# Patient Record
Sex: FEMALE | Race: WHITE | Employment: FULL TIME | URBAN - METROPOLITAN AREA
[De-identification: names, ages, dates, MRNs, and addresses within clinical notes are randomized per-mention and may not be internally consistent; named-entity substitution may affect disease eponyms.]

---

## 2012-05-16 LAB — PAP SMEAR - HIM PATIENT REPORTED: NEGATIVE

## 2015-12-31 LAB — PAP-ABSTRACT: NORMAL

## 2017-01-27 ENCOUNTER — TRANSFERRED RECORDS (OUTPATIENT)
Dept: HEALTH INFORMATION MANAGEMENT | Facility: CLINIC | Age: 27
End: 2017-01-27

## 2017-01-27 LAB — PHQ9 SCORE: 1

## 2017-07-25 ENCOUNTER — OFFICE VISIT (OUTPATIENT)
Dept: FAMILY MEDICINE | Facility: CLINIC | Age: 27
End: 2017-07-25
Payer: COMMERCIAL

## 2017-07-25 VITALS
WEIGHT: 293 LBS | DIASTOLIC BLOOD PRESSURE: 106 MMHG | TEMPERATURE: 98.7 F | BODY MASS INDEX: 39.68 KG/M2 | HEART RATE: 76 BPM | SYSTOLIC BLOOD PRESSURE: 168 MMHG | HEIGHT: 72 IN | RESPIRATION RATE: 16 BRPM

## 2017-07-25 DIAGNOSIS — E66.01 MORBID OBESITY DUE TO EXCESS CALORIES (H): ICD-10-CM

## 2017-07-25 DIAGNOSIS — F33.0 MAJOR DEPRESSIVE DISORDER, RECURRENT EPISODE, MILD (H): Primary | ICD-10-CM

## 2017-07-25 DIAGNOSIS — R03.0 ELEVATED BLOOD PRESSURE READING WITHOUT DIAGNOSIS OF HYPERTENSION: ICD-10-CM

## 2017-07-25 DIAGNOSIS — F41.9 ANXIETY: ICD-10-CM

## 2017-07-25 LAB
ERYTHROCYTE [DISTWIDTH] IN BLOOD BY AUTOMATED COUNT: 11.5 % (ref 10–15)
HCT VFR BLD AUTO: 40.3 % (ref 35–47)
HGB BLD-MCNC: 13.3 G/DL (ref 11.7–15.7)
MCH RBC QN AUTO: 30.4 PG (ref 26.5–33)
MCHC RBC AUTO-ENTMCNC: 33 G/DL (ref 31.5–36.5)
MCV RBC AUTO: 92 FL (ref 78–100)
PLATELET # BLD AUTO: 301 10E9/L (ref 150–450)
RBC # BLD AUTO: 4.37 10E12/L (ref 3.8–5.2)
WBC # BLD AUTO: 5.8 10E9/L (ref 4–11)

## 2017-07-25 PROCEDURE — 84443 ASSAY THYROID STIM HORMONE: CPT | Performed by: PHYSICIAN ASSISTANT

## 2017-07-25 PROCEDURE — 99203 OFFICE O/P NEW LOW 30 MIN: CPT | Performed by: PHYSICIAN ASSISTANT

## 2017-07-25 PROCEDURE — 36415 COLL VENOUS BLD VENIPUNCTURE: CPT | Performed by: PHYSICIAN ASSISTANT

## 2017-07-25 PROCEDURE — 85027 COMPLETE CBC AUTOMATED: CPT | Performed by: PHYSICIAN ASSISTANT

## 2017-07-25 PROCEDURE — 80048 BASIC METABOLIC PNL TOTAL CA: CPT | Performed by: PHYSICIAN ASSISTANT

## 2017-07-25 RX ORDER — FLUOXETINE 40 MG/1
CAPSULE ORAL
Refills: 4 | COMMUNITY
Start: 2017-04-30 | End: 2018-09-17

## 2017-07-25 RX ORDER — NORGESTIMATE AND ETHINYL ESTRADIOL 7DAYSX3 28
KIT ORAL
Refills: 4 | COMMUNITY
Start: 2017-04-30 | End: 2017-07-31 | Stop reason: ALTCHOICE

## 2017-07-25 ASSESSMENT — ANXIETY QUESTIONNAIRES
7. FEELING AFRAID AS IF SOMETHING AWFUL MIGHT HAPPEN: NOT AT ALL
3. WORRYING TOO MUCH ABOUT DIFFERENT THINGS: SEVERAL DAYS
1. FEELING NERVOUS, ANXIOUS, OR ON EDGE: SEVERAL DAYS
6. BECOMING EASILY ANNOYED OR IRRITABLE: NOT AT ALL
IF YOU CHECKED OFF ANY PROBLEMS ON THIS QUESTIONNAIRE, HOW DIFFICULT HAVE THESE PROBLEMS MADE IT FOR YOU TO DO YOUR WORK, TAKE CARE OF THINGS AT HOME, OR GET ALONG WITH OTHER PEOPLE: SOMEWHAT DIFFICULT
2. NOT BEING ABLE TO STOP OR CONTROL WORRYING: SEVERAL DAYS
5. BEING SO RESTLESS THAT IT IS HARD TO SIT STILL: NOT AT ALL
GAD7 TOTAL SCORE: 3

## 2017-07-25 ASSESSMENT — PATIENT HEALTH QUESTIONNAIRE - PHQ9: 5. POOR APPETITE OR OVEREATING: NOT AT ALL

## 2017-07-25 NOTE — NURSING NOTE
"Chief Complaint   Patient presents with     New Patient     Establish Care     Recheck Medication       Initial BP (!) 196/96 (BP Location: Right arm, Patient Position: Sitting, Cuff Size: Adult Large)  Pulse 76  Temp 98.7  F (37.1  C) (Oral)  Resp 16  Ht 6' 1.25\" (1.861 m)  Wt (!) 384 lb (174.2 kg)  BMI 50.32 kg/m2 Estimated body mass index is 50.32 kg/(m^2) as calculated from the following:    Height as of this encounter: 6' 1.25\" (1.861 m).    Weight as of this encounter: 384 lb (174.2 kg).  Medication Reconciliation: complete   Olivia Kelly MA      "

## 2017-07-25 NOTE — MR AVS SNAPSHOT
After Visit Summary   7/25/2017    Zenobia Cantu    MRN: [id number]           Patient Information     Date Of Birth          1990        Visit Information        Provider Department      7/25/2017 4:00 PM Joe Ramirez PA-C [facility]        Today's Diagnoses     Major depressive disorder, recurrent episode, mild (H)    -  1    Morbid obesity due to excess calories (H)        Anxiety        Elevated blood pressure reading without diagnosis of hypertension          Care Instructions    MyFitnessPal 1200 calories  150 minutes per weeks            Follow-ups after your visit        Additional Services     ENDOCRINOLOGY ADULT REFERRAL       Your provider has referred you to: [facility]: [facility] [phone]   [email or web address]/      Please be aware that coverage of these services is subject to the terms and limitations of your health insurance plan.  Call member services at your health plan with any benefit or coverage questions.      Please bring the following to your appointment:    >>   Any x-rays, CTs or MRIs which have been performed.  Contact the facility where they were done to arrange for  prior to your scheduled appointment.    >>   List of current medications   >>   This referral request   >>   Any documents/labs given to you for this referral                  Who to contact     If you have questions or need follow up information about today's clinic visit or your schedule please contact [facility] directly at [phone].  Normal or non-critical lab and imaging results will be communicated to you by MyChart, letter or phone within 4 business days after the clinic has received the results. If you do not hear from us within 7 days, please contact the clinic through MyChart or phone. If you have a critical or abnormal lab result, we will notify you by phone as soon as possible.  Submit  "refill requests through [employer] or call your pharmacy and they will forward the refill request to us. Please allow 3 business days for your refill to be completed.          Additional Information About Your Visit        Drivablehart Information     [employer] lets you send messages to your doctor, view your test results, renew your prescriptions, schedule appointments and more. To sign up, go to [email or web address] . Click on \"Log in\" on the left side of the screen, which will take you to the Welcome page. Then click on \"Sign up Now\" on the right side of the page.     You will be asked to enter the access code listed below, as well as some personal information. Please follow the directions to create your username and password.     Your access code is: [email or web address]  Expires: 10/23/2017  4:57 PM     Your access code will  in 90 days. If you need help or a new code, please call your [place] clinic or [phone].        Care EveryWhere ID     This is your Care EveryWhere ID. This could be used by other organizations to access your [place] medical records  [id number]        Your Vitals Were     Pulse Temperature Respirations Height BMI (Body Mass Index)       76 98.7  F (37.1  C) (Oral) 16 6' 1.25\" (1.861 m) 50.32 kg/m2        Blood Pressure from Last 3 Encounters:   17 (!) 168/106    Weight from Last 3 Encounters:   17 (!) 384 lb (174.2 kg)              We Performed the Following     Basic metabolic panel     CBC with platelets     ENDOCRINOLOGY ADULT REFERRAL     TSH with free T4 reflex        Primary Care Provider    None Specified       No primary provider on file.        Equal Access to Services     [facility]: Hadii valerio Sorto, waaxda luqadaha, qaybta kaalmada ronel iyer. So [facility] [place] [phone].    ATENCIÓN: Si habla español, tiene a prescott disposición servicios gratuitos de asistencia lingüística. Llame al [phone].    We comply with " applicable federal civil rights laws and [place] laws. We do not discriminate on the basis of race, color, national origin, age, disability sex, sexual orientation or gender identity.            Thank you!     Thank you for choosing [facility]  for your care. Our goal is always to provide you with excellent care. Hearing back from our patients is one way we can continue to improve our services. Please take a few minutes to complete the written survey that you may receive in the mail after your visit with us. Thank you!             Your Updated Medication List - Protect others around you: Learn how to safely use, store and throw away your medicines at www.disposemymeds.org.          This list is accurate as of: 7/25/17  4:57 PM.  Always use your most recent med list.                   Brand Name Dispense Instructions for use Diagnosis    FLUoxetine 40 MG capsule    PROzac     TK 1 C PO D        TRI-SPRINTEC 0.18/0.215/0.25 MG-35 MCG per tablet   Generic drug:  norgestim-eth estrad triphasic      TK 1 T PO D

## 2017-07-26 LAB
ANION GAP SERPL CALCULATED.3IONS-SCNC: 5 MMOL/L (ref 3–14)
BUN SERPL-MCNC: 10 MG/DL (ref 7–30)
CALCIUM SERPL-MCNC: 8.7 MG/DL (ref 8.5–10.1)
CHLORIDE SERPL-SCNC: 106 MMOL/L (ref 94–109)
CO2 SERPL-SCNC: 28 MMOL/L (ref 20–32)
CREAT SERPL-MCNC: 0.79 MG/DL (ref 0.52–1.04)
GFR SERPL CREATININE-BSD FRML MDRD: 88 ML/MIN/1.7M2
GLUCOSE SERPL-MCNC: 89 MG/DL (ref 70–99)
POTASSIUM SERPL-SCNC: 4.4 MMOL/L (ref 3.4–5.3)
SODIUM SERPL-SCNC: 139 MMOL/L (ref 133–144)
TSH SERPL DL<=0.005 MIU/L-ACNC: 1.9 MU/L (ref 0.4–4)

## 2017-07-26 ASSESSMENT — ANXIETY QUESTIONNAIRES: GAD7 TOTAL SCORE: 3

## 2017-07-26 ASSESSMENT — PATIENT HEALTH QUESTIONNAIRE - PHQ9: SUM OF ALL RESPONSES TO PHQ QUESTIONS 1-9: 2

## 2017-07-27 ENCOUNTER — ALLIED HEALTH/NURSE VISIT (OUTPATIENT)
Dept: NURSING | Facility: CLINIC | Age: 27
End: 2017-07-27
Payer: COMMERCIAL

## 2017-07-27 VITALS — DIASTOLIC BLOOD PRESSURE: 100 MMHG | SYSTOLIC BLOOD PRESSURE: 172 MMHG | HEART RATE: 82 BPM | RESPIRATION RATE: 16 BRPM

## 2017-07-27 DIAGNOSIS — R03.0 ELEVATED BLOOD PRESSURE READING WITHOUT DIAGNOSIS OF HYPERTENSION: Primary | ICD-10-CM

## 2017-07-27 PROCEDURE — 99207 ZZC NO CHARGE NURSE ONLY: CPT

## 2017-07-27 NOTE — MR AVS SNAPSHOT
After Visit Summary   7/27/2017    Zenobia Cantu    MRN: [id number]           Patient Information     Date Of Birth          1990        Visit Information        Provider Department      7/27/2017 4:00 PM FM NURSE [facility]        Today's Diagnoses     Elevated blood pressure reading without diagnosis of hypertension    -  1       Follow-ups after your visit        Your next 10 appointments already scheduled     Jul 27, 2017  4:00 PM CDT   Nurse [facility] with [facility]SE   [facility]    [address] [place] [address] [phone]            Jul 31, 2017  3:20 PM CDT   SHORT with Joe Ramirez PA-C   [facility]    [address] [place] [address] [phone]            Aug 02, 2017  3:30 PM CDT   New Visit with LENORA [facility] CNP   [facility]    [address]  [facility] [address] [phone]              Who to contact     If you have questions or need follow up information about today's clinic visit or your schedule please contact [facility] directly at [phone].  Normal or non-critical lab and imaging results will be communicated to you by MyChart, letter or phone within 4 business days after the clinic has received the results. If you do not hear from us within 7 days, please contact the clinic through MyChart or phone. If you have a critical or abnormal lab result, we will notify you by phone as soon as possible.  Submit refill requests through [employer] or call your pharmacy and they will forward the refill request to us. Please allow 3 business days for your refill to be completed.          Additional Information About Your Visit        [employer] Information     [employer] gives you secure access to your electronic health record. If you  see a primary care provider, you can also send messages to your care team and make appointments. If you have questions, please call your primary care clinic.  If you do not have a primary care provider, please call [phone] and they will assist you.        Care EveryWhere ID     This is your Care EveryWhere ID. This could be used by other organizations to access your [place] medical records  [id number]        Your Vitals Were     Pulse Respirations                82 16           Blood Pressure from Last 3 Encounters:   07/27/17 (!) 172/100   07/25/17 (!) 168/106    Weight from Last 3 Encounters:   07/25/17 (!) 384 lb (174.2 kg)              Today, you had the following     No orders found for display       Primary Care Provider    None Specified       No primary provider on file.        Equal Access to Services     DAVY RAMIREZ : Charli Sorto, waaxda luqadaha, qaybta kaalmada jadenyarajiv, ronel kerr . So [facility] [phone].    ATENCIÓN: Si habla español, tiene a prescott disposición servicios gratuitos de asistencia lingüística. Llame al [phone].    We comply with applicable federal civil rights laws and [place] laws. We do not discriminate on the basis of race, color, national origin, age, disability sex, sexual orientation or gender identity.            Thank you!     Thank you for choosing [facility]  for your care. Our goal is always to provide you with excellent care. Hearing back from our patients is one way we can continue to improve our services. Please take a few minutes to complete the written survey that you may receive in the mail after your visit with us. Thank you!             Your Updated Medication List - Protect others around you: Learn how to safely use, store and throw away your medicines at www.disposemymeds.org.          This list is accurate as of: 7/27/17  3:50 PM.  Always use your most recent med list.                    Brand Name Dispense Instructions for use Diagnosis    FLUoxetine 40 MG capsule    PROzac     TK 1 C PO D        TRI-SPRINTEC 0.18/0.215/0.25 MG-35 MCG per tablet   Generic drug:  norgestim-eth estrad triphasic      TK 1 T PO D

## 2017-07-27 NOTE — PROGRESS NOTES
Zenobia Cantu is a 27 year old female who comes in today for a Blood Pressure check because of Recent high blood pressure reading at appointment.    *Document pulse and BP  *Use new set of vitals button for multiple readings.  *Use extended vitals for orthostatic    Vitals as recorded, a large cuff was used.      Current complaints: none    Disposition: Instructed patient to make follow up appointment with provider for elevated blood pressure.

## 2017-07-31 ENCOUNTER — OFFICE VISIT (OUTPATIENT)
Dept: FAMILY MEDICINE | Facility: CLINIC | Age: 27
End: 2017-07-31
Payer: COMMERCIAL

## 2017-07-31 VITALS
HEART RATE: 82 BPM | BODY MASS INDEX: 50.13 KG/M2 | DIASTOLIC BLOOD PRESSURE: 86 MMHG | WEIGHT: 293 LBS | OXYGEN SATURATION: 98 % | TEMPERATURE: 98.2 F | SYSTOLIC BLOOD PRESSURE: 184 MMHG

## 2017-07-31 DIAGNOSIS — I10 BENIGN ESSENTIAL HYPERTENSION: Primary | ICD-10-CM

## 2017-07-31 DIAGNOSIS — Z30.41 ENCOUNTER FOR SURVEILLANCE OF CONTRACEPTIVE PILLS: ICD-10-CM

## 2017-07-31 PROBLEM — E66.01 MORBID OBESITY (H): Status: ACTIVE | Noted: 2017-07-31

## 2017-07-31 PROCEDURE — 99213 OFFICE O/P EST LOW 20 MIN: CPT | Performed by: PHYSICIAN ASSISTANT

## 2017-07-31 RX ORDER — NORGESTIMATE AND ETHINYL ESTRADIOL 7DAYSX3 LO
1 KIT ORAL DAILY
Qty: 84 TABLET | Refills: 3 | Status: SHIPPED | OUTPATIENT
Start: 2017-07-31 | End: 2018-03-19

## 2017-07-31 RX ORDER — AMLODIPINE BESYLATE 5 MG/1
5 TABLET ORAL DAILY
Qty: 30 TABLET | Refills: 1 | Status: SHIPPED | OUTPATIENT
Start: 2017-07-31 | End: 2017-08-28

## 2017-07-31 ASSESSMENT — PATIENT HEALTH QUESTIONNAIRE - PHQ9: 5. POOR APPETITE OR OVEREATING: SEVERAL DAYS

## 2017-07-31 ASSESSMENT — ANXIETY QUESTIONNAIRES
GAD7 TOTAL SCORE: 11
1. FEELING NERVOUS, ANXIOUS, OR ON EDGE: SEVERAL DAYS
7. FEELING AFRAID AS IF SOMETHING AWFUL MIGHT HAPPEN: MORE THAN HALF THE DAYS
3. WORRYING TOO MUCH ABOUT DIFFERENT THINGS: MORE THAN HALF THE DAYS
5. BEING SO RESTLESS THAT IT IS HARD TO SIT STILL: SEVERAL DAYS
2. NOT BEING ABLE TO STOP OR CONTROL WORRYING: NEARLY EVERY DAY
IF YOU CHECKED OFF ANY PROBLEMS ON THIS QUESTIONNAIRE, HOW DIFFICULT HAVE THESE PROBLEMS MADE IT FOR YOU TO DO YOUR WORK, TAKE CARE OF THINGS AT HOME, OR GET ALONG WITH OTHER PEOPLE: SOMEWHAT DIFFICULT
6. BECOMING EASILY ANNOYED OR IRRITABLE: SEVERAL DAYS

## 2017-07-31 NOTE — PROGRESS NOTES
SUBJECTIVE:                                                    Zenobia Cantu is a 27 year old female who presents to clinic today for the following health issues:    Hypertension Follow-up      Outpatient blood pressures are not being checked.    Low Salt Diet: no added salt        Amount of exercise or physical activity: 4-5 days/week for an average of greater than 60 minutes    Problems taking medications regularly: No    Medication side effects: none  Diet: low salt    Zenobia is here for follow up of several moderately to severely elevated blood pressure readings.  She is new to our clinic and notes that she has had some elevated readings at her past clinic but never this high.  She has been feeling well in general and denies CP, SOB, headache, dizziness.  Her father has a history of HTN and is on medication.  The labs we did last time she was in were normal.    Results for orders placed or performed in visit on 07/25/17   TSH with free T4 reflex   Result Value Ref Range    TSH 1.90 0.40 - 4.00 mU/L   Basic metabolic panel   Result Value Ref Range    Sodium 139 133 - 144 mmol/L    Potassium 4.4 3.4 - 5.3 mmol/L    Chloride 106 94 - 109 mmol/L    Carbon Dioxide 28 20 - 32 mmol/L    Anion Gap 5 3 - 14 mmol/L    Glucose 89 70 - 99 mg/dL    Urea Nitrogen 10 7 - 30 mg/dL    Creatinine 0.79 0.52 - 1.04 mg/dL    GFR Estimate 88 >60 mL/min/1.7m2    GFR Estimate If Black >90   GFR Calc   >60 mL/min/1.7m2    Calcium 8.7 8.5 - 10.1 mg/dL   CBC with platelets   Result Value Ref Range    WBC 5.8 4.0 - 11.0 10e9/L    RBC Count 4.37 3.8 - 5.2 10e12/L    Hemoglobin 13.3 11.7 - 15.7 g/dL    Hematocrit 40.3 35.0 - 47.0 %    MCV 92 78 - 100 fl    MCH 30.4 26.5 - 33.0 pg    MCHC 33.0 31.5 - 36.5 g/dL    RDW 11.5 10.0 - 15.0 %    Platelet Count 301 150 - 450 10e9/L       BP Readings from Last 6 Encounters:   08/04/17 180/86   08/02/17 (!) 168/101   07/31/17 184/86   07/27/17 (!) 172/100   07/25/17 (!) 168/106        Problem list and histories reviewed & adjusted, as indicated.  Additional history: as documented    Reviewed and updated as needed this visit by clinical staff     Reviewed and updated as needed this visit by Provider         ROS:  Constitutional, HEENT, cardiovascular, pulmonary, gi and gu systems are negative, except as otherwise noted.      OBJECTIVE:   /86  Pulse 82  Temp 98.2  F (36.8  C) (Oral)  Wt (!) 382 lb 9.6 oz (173.5 kg)  SpO2 98%  BMI 50.13 kg/m2  Body mass index is 50.13 kg/(m^2).  GENERAL: healthy, alert and no distress  NECK: no adenopathy, no asymmetry, masses, or scars and thyroid normal to palpation  CV: regular rate and rhythm, normal S1 S2, no S3 or S4, no murmur, click or rub, no peripheral edema and peripheral pulses strong  MS: no gross musculoskeletal defects noted, no edema  SKIN: no suspicious lesions or rashes  PSYCH: mentation appears normal, affect normal/bright    Diagnostic Test Results:  none     ASSESSMENT/PLAN:   1. Benign essential hypertension  Start Norvasc today.  Nurse only BP check at the end of this week.  She is seeing Darya Fuentes for weight consult also in two days.  Discussed mechanism of action of medication, proper dosing, potential side effects and appropriate follow up.    - amLODIPine (NORVASC) 5 MG tablet; Take 1 tablet (5 mg) by mouth daily (Patient taking differently: Take 10 mg by mouth daily )  Dispense: 30 tablet; Refill: 1    2. Encounter for surveillance of contraceptive pills  Will change OCP to lower dose estrogen content, same progesterone.  Discussed to let me know if problems with spotting etc.  This could be affecting BP, could help to lower estrogen amount from 30 to 20mcg pill.  - norgestim-eth estrad triphasic (ORTHO TRI-CYCLEN LO) 0.18/0.215/0.25 MG-25 MCG per tablet; Take 1 tablet by mouth daily  Dispense: 84 tablet; Refill: 3        Joe Ramirez PA-C  [facility]

## 2017-07-31 NOTE — MR AVS SNAPSHOT
After Visit Summary   7/31/2017    Zenobia Cantu    MRN: [id number]           Patient Information     Date Of Birth          1990        Visit Information        Provider Department      7/31/2017 3:20 PM Joe Ramirez PA-C [facility]        Today's Diagnoses     Benign essential hypertension    -  1    Encounter for surveillance of contraceptive pills           Follow-ups after your visit        Follow-up notes from your care team     Return in about 1 week (around 8/7/2017) for BP Recheck.      Your next 10 appointments already scheduled     Aug 02, 2017  3:30 PM CDT   [facility]    [address]  [facility] [address] [phone]              Who to contact     If you have questions or need follow up information about today's clinic visit or your schedule please contact [facility] directly at [phone].  Normal or non-critical lab and imaging results will be communicated to you by Buku[employer]hart, letter or phone within 4 business days after the clinic has received the results. If you do not hear from us within 7 days, please contact the clinic through [employer] or phone. If you have a critical or abnormal lab result, we will notify you by phone as soon as possible.  Submit refill requests through [employer] or call your pharmacy and they will forward the refill request to us. Please allow 3 business days for your refill to be completed.          Additional Information About Your Visit        MyChart Information     [employer] gives you secure access to your electronic health record. If you see a primary care provider, you can also send messages to your care team and make appointments. If you have questions, please call your primary care clinic.  If you do not have a primary care provider, please call [phone] and they will assist you.        Care  EveryWhere ID     This is your Care EveryWhere ID. This could be used by other organizations to access your [place] medical records  [id number]        Your Vitals Were     Pulse Temperature Pulse Oximetry BMI (Body Mass Index)          82 98.2  F (36.8  C) (Oral) 98% 50.13 kg/m2         Blood Pressure from Last 3 Encounters:   07/31/17 184/86   07/27/17 (!) 172/100   07/25/17 (!) 168/106    Weight from Last 3 Encounters:   07/31/17 (!) 382 lb 9.6 oz (173.5 kg)   07/25/17 (!) 384 lb (174.2 kg)              Today, you had the following     No orders found for display         Today's Medication Changes          These changes are accurate as of: 7/31/17  3:43 PM.  If you have any questions, ask your nurse or doctor.               Start taking these medicines.        Dose/Directions    amLODIPine 5 MG tablet   Commonly known as:  NORVASC   Used for:  Benign essential hypertension   Started by:  Joe Ramirez PA-C        Dose:  5 mg   Take 1 tablet (5 mg) by mouth daily   Quantity:  30 tablet   Refills:  1         These medicines have changed or have updated prescriptions.        Dose/Directions    * TRI-SPRINTEC 0.18/0.215/0.25 MG-35 MCG per tablet   This may have changed:  Another medication with the same name was added. Make sure you understand how and when to take each.   Generic drug:  norgestim-eth estrad triphasic   Changed by:  Joe Ramirez PA-C        TK 1 T PO D   Refills:  4       * norgestim-eth estrad triphasic 0.18/0.215/0.25 MG-25 MCG per tablet   Commonly known as:  ORTHO TRI-CYCLEN LO   This may have changed:  You were already taking a medication with the same name, and this prescription was added. Make sure you understand how and when to take each.   Used for:  Encounter for surveillance of contraceptive pills   Changed by:  Joe Ramirez PA-C        Dose:  1 tablet   Take 1 tablet by mouth daily   Quantity:  84 tablet   Refills:  3       * Notice:  This list has 2  medication(s) that are the same as other medications prescribed for you. Read the directions carefully, and ask your doctor or other care provider to review them with you.         Where to get your medicines      These medications were sent to [id number] - [place], [place] - [address] AT [facility] [address], [facility] [place] [facility] [address]     Phone:  [phone]     amLODIPine 5 MG tablet    norgestim-eth estrad triphasic 0.18/0.215/0.25 MG-25 MCG per tablet                Primary Care Provider Office Phone # Fax #    Joe Ramirez PA-C [phone]       [facility] [address]  [facility] [address]        Equal Access to Services     DAVY RAMIREZ AH: Hadii valerio farley hadasho Soomaali, waaxda luqadaha, qaybta kaalmada adeegyada, waxay idiin hayamien jaden lemos. So [facility] [phone].    ATENCIÓN: Si habla español, tiene a prescott disposición servicios gratuitos de asistencia lingüística. Llame al [phone].    We comply with applicable federal civil rights laws and [place] laws. We do not discriminate on the basis of race, color, national origin, age, disability sex, sexual orientation or gender identity.            Thank you!     Thank you for choosing [facility]  for your care. Our goal is always to provide you with excellent care. Hearing back from our patients is one way we can continue to improve our services. Please take a few minutes to complete the written survey that you may receive in the mail after your visit with us. Thank you!             Your Updated Medication List - Protect others around you: Learn how to safely use, store and throw away your medicines at www.disposemymeds.org.          This list is accurate as of: 7/31/17  3:43 PM.  Always use your most recent med list.                   Brand Name Dispense Instructions for use Diagnosis    amLODIPine 5 MG tablet    NORVASC    30 tablet    Take 1 tablet (5 mg)  by mouth daily    Benign essential hypertension       FLUoxetine 40 MG capsule    PROzac     TK 1 C PO D        * TRI-SPRINTEC 0.18/0.215/0.25 MG-35 MCG per tablet   Generic drug:  norgestim-eth estrad triphasic      TK 1 T PO D        * norgestim-eth estrad triphasic 0.18/0.215/0.25 MG-25 MCG per tablet    ORTHO TRI-CYCLEN LO    84 tablet    Take 1 tablet by mouth daily    Encounter for surveillance of contraceptive pills       * Notice:  This list has 2 medication(s) that are the same as other medications prescribed for you. Read the directions carefully, and ask your doctor or other care provider to review them with you.

## 2017-07-31 NOTE — NURSING NOTE
"Chief Complaint   Patient presents with     Hypertension     BP check       Initial /86  Pulse 82  Temp 98.2  F (36.8  C) (Oral)  Wt (!) 382 lb 9.6 oz (173.5 kg)  SpO2 98%  BMI 50.13 kg/m2 Estimated body mass index is 50.13 kg/(m^2) as calculated from the following:    Height as of 7/25/17: 6' 1.25\" (1.861 m).    Weight as of this encounter: 382 lb 9.6 oz (173.5 kg).  Medication Reconciliation: complete   Sangeeta Lugo CMA (AAMA)      "

## 2017-08-01 ASSESSMENT — ANXIETY QUESTIONNAIRES: GAD7 TOTAL SCORE: 11

## 2017-08-01 ASSESSMENT — PATIENT HEALTH QUESTIONNAIRE - PHQ9: SUM OF ALL RESPONSES TO PHQ QUESTIONS 1-9: 3

## 2017-08-02 ENCOUNTER — OFFICE VISIT (OUTPATIENT)
Dept: ENDOCRINOLOGY | Facility: CLINIC | Age: 27
End: 2017-08-02
Payer: COMMERCIAL

## 2017-08-02 VITALS
BODY MASS INDEX: 50.3 KG/M2 | DIASTOLIC BLOOD PRESSURE: 101 MMHG | HEART RATE: 83 BPM | OXYGEN SATURATION: 96 % | WEIGHT: 293 LBS | SYSTOLIC BLOOD PRESSURE: 168 MMHG | RESPIRATION RATE: 20 BRPM | TEMPERATURE: 97.8 F

## 2017-08-02 DIAGNOSIS — F50.9 EATING DISORDER: ICD-10-CM

## 2017-08-02 DIAGNOSIS — R63.5 ABNORMAL WEIGHT GAIN: ICD-10-CM

## 2017-08-02 DIAGNOSIS — R06.83 SNORING: ICD-10-CM

## 2017-08-02 DIAGNOSIS — E66.01 MORBID OBESITY DUE TO EXCESS CALORIES (H): Primary | ICD-10-CM

## 2017-08-02 PROCEDURE — 99204 OFFICE O/P NEW MOD 45 MIN: CPT | Performed by: CLINICAL NURSE SPECIALIST

## 2017-08-02 NOTE — PROGRESS NOTES
Name: Zenobia Cantu  Seen at the request of Joe Ramirez for obesity.  HPI:  Zenobia Cantu is a 27 year old female who presents for the evaluation of obestiy  Has tried many different things for weight loss - over the counter weight loss supplements, weight watchers online, food diary/calorie tracking.  History of eating disorder. Binge eating disorder.  Currently not in an eating disorder program.      Menses: menarche age 13, cycles regular, currently on birth control - now periods shorter with lighter flow.  Diarrhea/Constipation:No chronic diarrhea or constipation problems, stress causes intermittent constipation  Changes in Hair or Skin:No  Diabetes:No  Sleep: no problems falling or staying asleep  Sleep Apnea/Snores:Yes: snores, no previous sleep study  Hypertension or CAD:yes, recently started norvasc two days ago   Vital Signs 7/25/2017 7/27/2017 7/31/2017 8/2/2017   Systolic 168 172 184 168   Diastolic 106 100 86 101     Hyperlipidemia:No  Hirsutism:No  Easy Brusing:Yes: bruises easily  Use of Steroids:No  Family history of Obesity:No  Diet: portion control  Exercise:Yes: 5 days per week, cardio and weights, [employer], working out up to 1.5 hours per day.  PMH/PSH:  History reviewed. No pertinent past medical history.  History reviewed. No pertinent surgical history.  Family Hx:  Family History   Problem Relation Age of Onset     Hypertension Father      Thyroid disease: No         DM2: Yes: MGM         Autoimmune: DM1, SLE, RA, Vitiligo No    Social Hx:  Social History     Social History     Marital status:      Spouse name: N/A     Number of children: N/A     Years of education: N/A     Occupational History     Not on file.     Social History Main Topics     Smoking status: Never Smoker     Smokeless tobacco: Never Used     Alcohol use Yes      Comment: rarely     Drug use: No     Sexual activity: Yes     Partners: Male     Birth control/ protection: Pill     Other Topics Concern      Parent/Sibling W/ Cabg, Mi Or Angioplasty Before 65f 55m? No     Social History Narrative          MEDICATIONS:  has a current medication list which includes the following prescription(s): amlodipine, norgestim-eth estrad triphasic, and fluoxetine.    ROS   ROS: 10 point ROS neg other than the symptoms noted above in the HPI.    GENERAL: no weight loss, fevers, chills, malaise, night sweats.   HEENT: no dysphagia, odonophagia, diplopia, neck pain or tenderness  CV: no chest pain, pressure, palpitations, skipped beats, LOC  LUNGS: no SOB, HELM, cough, sputum production, wheezing   ABDOMEN: no diarrhea, constipation, abdominal pain  EXTREMITIES: no rashes, ulcers, edema  NEUROLOGY: no changes in vision, tingling or numbness in hands or feet.   MSK: no muscle aches or pains, weakness  SKIN: no rashes or lesions  ENDOCRINE: no heat or cold intolerance    Physical Exam   VS: BP (!) 168/101 (BP Location: Left arm, Patient Position: Chair, Cuff Size: Adult Large)  Pulse 83  Temp 97.8  F (36.6  C) (Oral)  Resp 20  Wt (!) 174.1 kg (383 lb 14.4 oz)  LMP 07/08/2017 (Approximate)  SpO2 96%  Breastfeeding? No  BMI 50.3 kg/m2  GENERAL: AXOX3, NAD, well dressed, answering questions appropriately, appears stated age.  HEENT: OP clear, no LAD, no TM, non-tender, no exopthalmous, no proptosis, EOMI, no lig lag, no retraction  NECK:  Supple, no thyromegaly, no adenopathy  CV: RRR, no rubs, gallops, no murmurs  LUNGS: CTAB, no wheezes, rales, or ronchi  ABDOMEN: soft, nontender, nondistended, no broad striae noted.  EXTREMITIES: no edema, +pulses, no rashes, no lesions  NEUROLOGY: CN grossly intact, no tremors  MSK: grossly intact  SKIN: no acanthosis, skin tags; no rashes, no lesions, no intertrigo    LABS:  !COMPREHENSIVE Latest Ref Rng & Units 7/25/2017   SODIUM 133 - 144 mmol/L 139   POTASSIUM 3.4 - 5.3 mmol/L 4.4   CHLORIDE 94 - 109 mmol/L 106   BUN 7 - 30 mg/dL 10   Creatinine 0.52 - 1.04 mg/dL 0.79   Glucose 70 - 99 mg/dL  "89   ANION GAP 3 - 14 mmol/L 5   CALCIUM 8.5 - 10.1 mg/dL 8.7     !THYROID Latest Ref Rng & Units 7/25/2017   TSH 0.40 - 4.00 mU/L 1.90     Component    Latest Ref Rng & Units 7/25/2017   WBC    4.0 - 11.0 10e9/L 5.8   RBC Count    3.8 - 5.2 10e12/L 4.37   Hemoglobin    11.7 - 15.7 g/dL 13.3   Hematocrit    35.0 - 47.0 % 40.3   Waist Circumferency: 63\" (today)    All pertinent notes, labs, and images personally reviewed by me.     A/P  Ms.Amanda Cantu is a 27 year old here for the evaluation of obestiy:    1. Obesity-  Obesity is associated with a significant increase in mortality and risk of many disorders, including diabetes mellitus, hypertension, dyslipidemia, heart disease, stroke, sleep apnea, cancer, and many others. Conversely, weight loss is associated with a reduction in obesity-associated morbidity.    Endocrine evaluation of obesity includes Diabetes/prediabetes, Cushing's and thyroid dysfunction.  The relevant work up for the diagnosis and management of obesity and various treatment options were discussed. Body Mass Index (BMI) has been a standard means for calculating risk for overweight and obesity. The new American Association of Clinical Endocrinology (AACE) algorithm recommends lifestyle modifications as the initial phase of treatment for all patients with the BMI equal or greater than 25 kg/m2. Lifestyle modifications includes use of medical nutrition therapy, exercise, tobacco cessation, adequate quality and quantity of sleep, limited consumption of alcohol and reduced stress through implementation of a structured, multidisciplinary program.    In patients with complications associated with obesity, graded interventions are recommended including pharmacological therapy such as phentermine, orlistat, lorcaserin and phentermine/topiramate ER, contrave ( buproprion/naltreone) and the use of very low calorie meal replacement programs.    If medical intervention is insufficient, surgical therapy may " be considered, especially in patients with BMI greater than or equal to 35 kg/m2 with multiple complications. Surgical treatments include lap-band, gastric sleeve or gastric bypass surgery.    Will obtain cortisol, glucose, insulin, c-peptide, A1c, lipids.  BP needs to be in better control before starting any weight loss medications.    2.  Snoring.  Recommend scheduling sleep study.    3.  History of binge eating disorder. States this is not currently controlled.  Previously in the [facility] program but the program hours did not work out with her work schedule.  Recommend she look into the [facility] program - depending on her insurance coverage.  I think it is preferable she work with an eating disorder program but alternately could start by seeing a counselor.    Labs ordered today:   Orders Placed This Encounter   Procedures     Cortisol     Glucose     Insulin level     C-peptide     Hemoglobin A1c     Lipid panel reflex to direct LDL     SLEEP EVALUATION & MANAGEMENT REFERRAL - ADULT     Radiology/Consults ordered today: SLEEP EVALUATION & MANAGEMENT REFERRAL - ADULT    More than 50% of the time spent with Ms. Cantu on counseling / coordinating her care.  Total face to face time was 45 minutes.      Follow-up:  1 month    Darya Fuentes NP  Endocrinology  [facility]  CC: Joe Ramirez   ____________________________________________________________

## 2017-08-02 NOTE — PATIENT INSTRUCTIONS
Collect the saliva sample at 11pm.  Make a lab-only appointment for 8am - this is a fasting lab test.  I'll let you know all results and any treatment recommendations once results are available.    Message me - use [facility]oyou or call the clinic and leave me a message once your blood pressure is controlled.  We can start weight loss medication at that time.    Call to schedule the sleep study.    You'll follow up with me one month after starting medication.    Darya Fuentes NP  Endocrinology

## 2017-08-02 NOTE — MR AVS SNAPSHOT
After Visit Summary   8/2/2017    Zenobia Cantu    MRN: [id number]           Patient Information     Date Of Birth          1990        Visit Information        Provider Department      8/2/2017 3:30 PM Darya Fuentes APRN CNP [facility]        Today's Diagnoses     Morbid obesity due to excess calories (H)    -  1    BMI 50.0-59.9, adult (H)        Abnormal weight gain        Benign essential hypertension        Snoring          Care Instructions        Collect the saliva sample at 11pm.  Make a lab-only appointment for 8am - this is a fasting lab test.  I'll let you know all results and any treatment recommendations once results are available.    Message me - use [facility] or call the clinic and leave me a message once your blood pressure is controlled.  We can start weight loss medication at that time.    Call to schedule the sleep study.    You'll follow up with me one month after starting medication.    Darya Fuentes NP  Endocrinology              Follow-ups after your visit        Additional Services     SLEEP EVALUATION & MANAGEMENT REFERRAL - ADULT       Please be aware that coverage of these services is subject to the terms and limitations of your health insurance plan.  Call member services at your health plan with any benefit or coverage questions.      Please bring the following to your appointment:    >>   List of current medications   >>   This referral request   >>   Any documents/labs given to you for this referral    [facility] [phone] (Age 18 and up)                  Your next 10 appointments already scheduled     Aug 04, 2017  4:00 PM CDT   Nurse Only with  NURSE   [facility]    [address] [place] [address], [address]  [facility] [address] [phone]            Aug 28, 2017  3:20 PM CDT   SHORT with Joe Ramirez PA-C   [facility] ([facility]  [facility]    [address] [address], [address]  [facility] [address] [phone]              Future tests that were ordered for you today     Open Future Orders        Priority Expected Expires Ordered    SLEEP EVALUATION & MANAGEMENT REFERRAL - ADULT Routine  8/2/2018 8/2/2017    Hemoglobin A1c Routine  11/2/2017 8/2/2017    Cortisol Saliva Routine  11/2/2017 8/2/2017    Cortisol Routine  11/2/2017 8/2/2017    Glucose Routine  11/2/2017 8/2/2017    Insulin level Routine  11/2/2017 8/2/2017    C-peptide Routine  11/2/2017 8/2/2017            Who to contact     If you have questions or need follow up information about today's clinic visit or your schedule please contact [facility] directly at [phone].  Normal or non-critical lab and imaging results will be communicated to you by MyChart, letter or phone within 4 business days after the clinic has received the results. If you do not hear from us within 7 days, please contact the clinic through [employer] or phone. If you have a critical or abnormal lab result, we will notify you by phone as soon as possible.  Submit refill requests through [employer] or call your pharmacy and they will forward the refill request to us. Please allow 3 business days for your refill to be completed.          Additional Information About Your Visit        [facility] Information     [employer] gives you secure access to your electronic health record. If you see a primary care provider, you can also send messages to your care team and make appointments. If you have questions, please call your primary care clinic.  If you do not have a primary care provider, please call [phone] and they will assist you.        Care EveryWhere ID     This is your Care EveryWhere ID. This could be used by other organizations to access your [facility] medical records  [id number]        Your Vitals Were     Pulse Temperature Respirations Last Period Pulse Oximetry Breastfeeding?    83  97.8  F (36.6  C) (Oral) 20 07/08/2017 (Approximate) 96% No    BMI (Body Mass Index)                   50.3 kg/m2            Blood Pressure from Last 3 Encounters:   08/02/17 (!) 168/101   07/31/17 184/86   07/27/17 (!) 172/100    Weight from Last 3 Encounters:   08/02/17 (!) 383 lb 14.4 oz (174.1 kg)   07/31/17 (!) 382 lb 9.6 oz (173.5 kg)   07/25/17 (!) 384 lb (174.2 kg)               Primary Care Provider Office Phone # Fax #    Joe Katie Ramirez PA-C [phone]       [facility] [address]  [place] [facility] [address]        Equal Access to Services     DAVY RAMIREZ : Hadii valerio farley hadasho Soomaali, waaxda luqadaha, qaybta kaalmada adeegyada, waxdavid lemos. So [facility] [phone].    ATENCIÓN: Si habla español, tiene a prescott disposición servicios gratuitos de asistencia lingüística. Marcio al [phone].    We comply with applicable federal civil rights laws and [place] laws. We do not discriminate on the basis of race, color, national origin, age, disability sex, sexual orientation or gender identity.            Thank you!     Thank you for choosing [facility]  for your care. Our goal is always to provide you with excellent care. Hearing back from our patients is one way we can continue to improve our services. Please take a few minutes to complete the written survey that you may receive in the mail after your visit with us. Thank you!             Your Updated Medication List - Protect others around you: Learn how to safely use, store and throw away your medicines at www.disposemymeds.org.          This list is accurate as of: 8/2/17  4:15 PM.  Always use your most recent med list.                   Brand Name Dispense Instructions for use Diagnosis    amLODIPine 5 MG tablet    NORVASC    30 tablet    Take 1 tablet (5 mg) by mouth daily    Benign essential hypertension       FLUoxetine 40 MG capsule    PROzac     TK 1 C PO D         norgestim-eth estrad triphasic 0.18/0.215/0.25 MG-25 MCG per tablet    ORTHO TRI-CYCLEN LO    84 tablet    Take 1 tablet by mouth daily    Encounter for surveillance of contraceptive pills

## 2017-08-02 NOTE — NURSING NOTE
"Chief Complaint   Patient presents with     Weight Problem     management       Initial BP (!) 168/101 (BP Location: Left arm, Patient Position: Chair, Cuff Size: Adult Large)  Pulse 83  Temp 97.8  F (36.6  C) (Oral)  Resp 20  Wt (!) 383 lb 14.4 oz (174.1 kg)  LMP 07/08/2017 (Approximate)  SpO2 96%  Breastfeeding? No  BMI 50.3 kg/m2 Estimated body mass index is 50.3 kg/(m^2) as calculated from the following:    Height as of 7/25/17: 6' 1.25\" (1.861 m).    Weight as of this encounter: 383 lb 14.4 oz (174.1 kg).  Medication Reconciliation: complete  Niru Blue M.A.  "

## 2017-08-03 ENCOUNTER — MYC MEDICAL ADVICE (OUTPATIENT)
Dept: ENDOCRINOLOGY | Facility: CLINIC | Age: 27
End: 2017-08-03

## 2017-08-04 ENCOUNTER — ALLIED HEALTH/NURSE VISIT (OUTPATIENT)
Dept: NURSING | Facility: CLINIC | Age: 27
End: 2017-08-04
Payer: COMMERCIAL

## 2017-08-04 VITALS — SYSTOLIC BLOOD PRESSURE: 180 MMHG | HEART RATE: 80 BPM | DIASTOLIC BLOOD PRESSURE: 86 MMHG

## 2017-08-04 DIAGNOSIS — R03.0 ELEVATED BLOOD PRESSURE READING WITHOUT DIAGNOSIS OF HYPERTENSION: Primary | ICD-10-CM

## 2017-08-04 PROCEDURE — 99207 ZZC NO CHARGE NURSE ONLY: CPT

## 2017-08-04 NOTE — NURSING NOTE
Starting today patient should take (2) tabs daily of the amLODIPine (NORVASC) 5 MG tablet per Joe Ramirez PA-C. Come back next Friday(08/11/17) for another nurse only BLOOD PRESSURE check.     Lisa Magill, CMA

## 2017-08-04 NOTE — NURSING NOTE
Zenobia Cantu is a 27 year old female who comes in today for a Blood Pressure check because of new medication and ongoing blood pressure monitoring.    *Document pulse and BP  *Use new set of vitals button for multiple readings.  *Use extended vitals for orthostatic    Vitals as recorded, a large cuff was used.    Patient is taking medication as prescribed  Patient is tolerating medications well.  Patient is not monitoring Blood Pressure at home.  Average readings if yes are N/A    Current complaints: none    Disposition: MD/AP notified while patient in the clinic

## 2017-08-06 ENCOUNTER — MYC MEDICAL ADVICE (OUTPATIENT)
Dept: ENDOCRINOLOGY | Facility: CLINIC | Age: 27
End: 2017-08-06

## 2017-08-08 ENCOUNTER — MYC MEDICAL ADVICE (OUTPATIENT)
Dept: FAMILY MEDICINE | Facility: CLINIC | Age: 27
End: 2017-08-08

## 2017-08-08 NOTE — TELEPHONE ENCOUNTER
Norvasc is known for swelling.  I agree that this looks more like insect bites but very hard to diagnose rashes without seeing in person.  I'd have her use OTC hydrocortisone cream and wait a week to see if they resolve.      Joe

## 2017-08-08 NOTE — TELEPHONE ENCOUNTER
Please see [employer] message and Picture.     Norvasc shows <2% of a skin rash.     Chiggers? Mosquito Bites?    Lawanda SHABAZZ RN, BSN, PHN  Wadley Flex RN

## 2017-08-09 ENCOUNTER — ALLIED HEALTH/NURSE VISIT (OUTPATIENT)
Dept: NURSING | Facility: CLINIC | Age: 27
End: 2017-08-09
Payer: COMMERCIAL

## 2017-08-09 ENCOUNTER — MYC MEDICAL ADVICE (OUTPATIENT)
Dept: ENDOCRINOLOGY | Facility: CLINIC | Age: 27
End: 2017-08-09

## 2017-08-09 VITALS — DIASTOLIC BLOOD PRESSURE: 80 MMHG | SYSTOLIC BLOOD PRESSURE: 138 MMHG

## 2017-08-09 DIAGNOSIS — Z01.30 BLOOD PRESSURE CHECK: Primary | ICD-10-CM

## 2017-08-09 PROCEDURE — 99207 ZZC NO CHARGE NURSE ONLY: CPT

## 2017-08-11 ENCOUNTER — MYC MEDICAL ADVICE (OUTPATIENT)
Dept: FAMILY MEDICINE | Facility: CLINIC | Age: 27
End: 2017-08-11

## 2017-08-11 PROCEDURE — 99000 SPECIMEN HANDLING OFFICE-LAB: CPT | Performed by: CLINICAL NURSE SPECIALIST

## 2017-08-11 PROCEDURE — 82530 CORTISOL FREE: CPT | Mod: 90 | Performed by: CLINICAL NURSE SPECIALIST

## 2017-08-11 NOTE — TELEPHONE ENCOUNTER
Joe Taylor is out until next Tuesday.  Can you review this message and respond to patient?    Thank you,    Magnolia Oswald RN

## 2017-08-12 DIAGNOSIS — R63.5 ABNORMAL WEIGHT GAIN: ICD-10-CM

## 2017-08-12 DIAGNOSIS — E66.01 MORBID OBESITY DUE TO EXCESS CALORIES (H): ICD-10-CM

## 2017-08-12 DIAGNOSIS — R06.83 SNORING: ICD-10-CM

## 2017-08-12 LAB
CHOLEST SERPL-MCNC: 128 MG/DL
CORTIS SERPL-MCNC: 13.4 UG/DL (ref 4–22)
GLUCOSE SERPL-MCNC: 103 MG/DL (ref 70–99)
HBA1C MFR BLD: 5.7 % (ref 4.3–6)
HDLC SERPL-MCNC: 42 MG/DL
INSULIN SERPL-ACNC: 21.7 MU/L (ref 3–25)
LDLC SERPL CALC-MCNC: 65 MG/DL
NONHDLC SERPL-MCNC: 86 MG/DL
TRIGL SERPL-MCNC: 105 MG/DL

## 2017-08-12 PROCEDURE — 36415 COLL VENOUS BLD VENIPUNCTURE: CPT | Performed by: CLINICAL NURSE SPECIALIST

## 2017-08-12 PROCEDURE — 80061 LIPID PANEL: CPT | Performed by: CLINICAL NURSE SPECIALIST

## 2017-08-12 PROCEDURE — 84681 ASSAY OF C-PEPTIDE: CPT | Performed by: CLINICAL NURSE SPECIALIST

## 2017-08-12 PROCEDURE — 83525 ASSAY OF INSULIN: CPT | Performed by: CLINICAL NURSE SPECIALIST

## 2017-08-12 PROCEDURE — 82947 ASSAY GLUCOSE BLOOD QUANT: CPT | Performed by: CLINICAL NURSE SPECIALIST

## 2017-08-12 PROCEDURE — 82533 TOTAL CORTISOL: CPT | Performed by: CLINICAL NURSE SPECIALIST

## 2017-08-12 PROCEDURE — 83036 HEMOGLOBIN GLYCOSYLATED A1C: CPT | Performed by: CLINICAL NURSE SPECIALIST

## 2017-08-14 ENCOUNTER — MYC MEDICAL ADVICE (OUTPATIENT)
Dept: ENDOCRINOLOGY | Facility: CLINIC | Age: 27
End: 2017-08-14

## 2017-08-14 DIAGNOSIS — R73.01 IFG (IMPAIRED FASTING GLUCOSE): Primary | ICD-10-CM

## 2017-08-14 LAB — C PEPTIDE SERPL-MCNC: 3.3 NG/ML (ref 0.9–6.9)

## 2017-08-14 RX ORDER — METFORMIN HCL 500 MG
500 TABLET, EXTENDED RELEASE 24 HR ORAL 2 TIMES DAILY WITH MEALS
Qty: 60 TABLET | Refills: 1 | Status: SHIPPED | OUTPATIENT
Start: 2017-08-14 | End: 2018-02-20

## 2017-08-15 ENCOUNTER — OFFICE VISIT (OUTPATIENT)
Dept: SLEEP MEDICINE | Facility: CLINIC | Age: 27
End: 2017-08-15
Payer: COMMERCIAL

## 2017-08-15 ENCOUNTER — MYC MEDICAL ADVICE (OUTPATIENT)
Dept: ENDOCRINOLOGY | Facility: CLINIC | Age: 27
End: 2017-08-15

## 2017-08-15 VITALS
HEART RATE: 84 BPM | WEIGHT: 293 LBS | HEIGHT: 72 IN | OXYGEN SATURATION: 97 % | RESPIRATION RATE: 18 BRPM | BODY MASS INDEX: 39.68 KG/M2 | DIASTOLIC BLOOD PRESSURE: 99 MMHG | SYSTOLIC BLOOD PRESSURE: 157 MMHG

## 2017-08-15 DIAGNOSIS — R06.83 SNORING: Primary | ICD-10-CM

## 2017-08-15 DIAGNOSIS — E66.01 MORBID OBESITY DUE TO EXCESS CALORIES (H): ICD-10-CM

## 2017-08-15 DIAGNOSIS — G47.19 EXCESSIVE DAYTIME SLEEPINESS: ICD-10-CM

## 2017-08-15 DIAGNOSIS — I10 ESSENTIAL HYPERTENSION: ICD-10-CM

## 2017-08-15 PROCEDURE — 99205 OFFICE O/P NEW HI 60 MIN: CPT | Performed by: FAMILY MEDICINE

## 2017-08-15 RX ORDER — ZOLPIDEM TARTRATE 5 MG/1
TABLET ORAL
Qty: 1 TABLET | Refills: 0 | Status: SHIPPED | OUTPATIENT
Start: 2017-08-15 | End: 2017-09-13

## 2017-08-15 NOTE — PATIENT INSTRUCTIONS
Your BMI is Body mass index is 49.46 kg/(m^2).  Weight management is a personal decision.  If you are interested in exploring weight loss strategies, the following discussion covers the approaches that may be successful. Body mass index (BMI) is one way to tell whether you are at a healthy weight, overweight, or obese. It measures your weight in relation to your height.  A BMI of 18.5 to 24.9 is in the healthy range. A person with a BMI of 25 to 29.9 is considered overweight, and someone with a BMI of 30 or greater is considered obese. More than two-thirds of American adults are considered overweight or obese.  Being overweight or obese increases the risk for further weight gain. Excess weight may lead to heart disease and diabetes.  Creating and following plans for healthy eating and physical activity may help you improve your health.  Weight control is part of healthy lifestyle and includes exercise, emotional health, and healthy eating habits. Careful eating habits lifelong are the mainstay of weight control. Though there are significant health benefits from weight loss, long-term weight loss with diet alone may be very difficult to achieve- studies show long-term success with dietary management in less than 10% of people. Attaining a healthy weight may be especially difficult to achieve in those with severe obesity. In some cases, medications, devices and surgical management might be considered.  What can you do?  If you are overweight or obese and are interested in methods for weight loss, you should discuss this with your provider.     Consider reducing daily calorie intake by 500 calories.     Keep a food journal.     Avoiding skipping meals, consider cutting portions instead.    Diet combined with exercise helps maintain muscle while optimizing fat loss. Strength training is particularly important for building and maintaining muscle mass. Exercise helps reduce stress, increase energy, and improves fitness.  "Increasing exercise without diet control, however, may not burn enough calories to loose weight.       Start walking three days a week 10-20 minutes at a time    Work towards walking thirty minutes five days a week     Eventually, increase the speed of your walking for 1-2 minutes at time    In addition, we recommend that you review healthy lifestyles and methods for weight loss available through the National Institutes of Health patient information sites:  http://win.niddk.nih.gov/publications/index.htm    And look into health and wellness programs that may be available through your health insurance provider, employer, local community center, or john club.    Weight management plan: Patient was referred to their PCP to discuss a diet and exercise plan.       MY TREATMENT INFORMATION FOR SLEEP APNEA -  Zenobia Cantu    DOCTOR: Kenney Hector MD, MPH  SLEEP CENTER : [facility]         If I haven't had a sleep study yet, what can I expect?  A personal story from Pradeep  https://www.AutoRef.com.com/watch?v=AxPLmlRpnCs      Am I having a home sleep study?  Here is a video in case you get home and want to make sure you have done it correctly  https://www.AutoRef.com.com/watch?v=HCZ6T7dMtn4&feature=youtu.be      Frequently asked questions:  1. What is Obstructive Sleep Apnea (KENY)? KENY is the most common type of sleep apnea. Apnea literally means, \"without breath.\" It is characterized by repetitive pauses in breathing, despite continued effort to breathe, and is usually associated with a reduction in blood oxygen saturation. Apneas can last 10 to over 60 seconds. It is caused by narrowing or collapse of the upper airway as muscles relax during sleep. Severity of sleep apnea is determined by frequency of breathing events and their effect on your sleep and oxygen levels determined during sleep testing.     2. What are the consequences of KENY? Symptoms include: daytime sleepiness- possibly increasing the risk " of falling asleep while driving, unrefreshing/restless sleep, snoring, insomnia, waking frequently to urinate, waking with heartburn or reflux, reduced concentration and memory, and morning headaches. Other health consequences may include development of high blood pressure and other cardiovascular disease in persons who are susceptible. Untreated KENY  can contribute to heart disease, stroke and diabetes.     3. What are the treatment options? In most situations, sleep apnea is a lifelong disease that must be managed with daily therapy. Medications are not effective for sleep apnea and surgery is generally not performed until other therapies have been tried. Therapy is usually tailored to the individual patient based on many factors including your wishes as well as severity of sleep apnea and severity of obesity. Continuous Positive Airway (CPAP) is the most reliable treatment. An oral device to hold your jaw forward is usually the next most reliable option. Other options include postioning devices (to keep you off your back), weight loss, and surgery including a tongue pacing device. There is more detail about some of these options below.            1. CPAP-  WHAT DOES IT DO AND HOW CAN I LEARN TO WEAR IT?                               BEFORE I START, CAN I WATCH A MOVIE TO GET A PLAN ON HOW TO USE CPAP?  https://www.Railpod.com/watch?s=e2F96iz890X      Continuous positive airway pressure, or CPAP, is the most effective treatment for obstructive sleep apnea. It works by blowing room air, through a mask, to hold your throat open. A decision to use CPAP is a major step forward in the pursuit of a healthier life. The successful use of CPAP will help you breathe easier, sleep better and live healthier. You can choose CPAP equipment from any durable medical equipment provider that meets your needs.  Using CPAP can be a positive experience if you keep these encinas points in mind:  1. Commitment  CPAP is not a quick fix for  "your problem. It involves a long-term commitment to improve your sleep and your health.    2. Communication  Stay in close communication with both your sleep doctor and your CPAP supplier. Ask lots of questions and seek help when you need it.    3. Consistency  Use CPAP all night, every night and for every nap. You will receive the maximum health benefits from CPAP when you use it every time that you sleep. This will also make it easier for your body to adjust to the treatment.    4. Correction  The first machine and mask that you try may not be the best ones for you. Work with your sleep doctor and your CPAP supplier to make corrections to your equipment selection. Ask about trying a different type of machine or mask if you have ongoing problems. Make sure that your mask is a good fit and learn to use your equipment properly.    5. Challenge  Tell a family member or close friend to ask you each morning if you used your CPAP the previous night. Have someone to challenge you to give it your best effort.    6. Connection   Your adjustment to CPAP will be easier if you are able to connect with others who use the same treatment. Ask your sleep doctor if there is a support group in your area for people who have sleep apnea, or look for one on the Internet.  7. Comfort   Increase your level of comfort by using a saline spray, decongestant or heated humidifier if CPAP irritates your nose, mouth or throat. Use your unit's \"ramp\" setting to slowly get used to the air pressure level. There may be soft pads you can buy that will fit over your mask straps. Look on www.CPAP.com for accessories that can help make CPAP use more comfortable.  8. Cleaning   Clean your mask, tubing and headgear on a regular basis. Put this time in your schedule so that you don't forget to do it. Check and replace the filters for your CPAP unit and humidifier.    9. Completion   Although you are never finished with CPAP therapy, you should reward " yourself by celebrating the completion of your first month of treatment. Expect this first month to be your hardest period of adjustment. It will involve some trial and error as you find the machine, mask and pressure settings that are right for you.    10. Continuation  After your first month of treatment, continue to make a daily commitment to use your CPAP all night, every night and for every nap.    CPAP-Tips to starting with success:  Begin using your CPAP for short periods of time during the day while you watch TV or read.    Use CPAP every night and for every nap. Using it less often reduces the health benefits and makes it harder for your body to get used to it.    Make small adjustments to your mask, tubing, straps and headgear until you get the right fit. Tightening the mask may actually worsen the leak.  If it leaves significant marks on your face or irritates the bridge of your nose, it may not be the best mask for you.  Speak with the person who supplied the mask and consider trying other masks. Insurances will allow you to try different masks during the first month of starting CPAP.  Insurance also covers a new mask, hose and filter about every 6 months.    Use a saline nasal spray to ease mild nasal congestion. Neti-Pot or saline nasal rinses may also help. Nasal gel sprays can help reduce nasal dryness.  Biotene mouthwash can be helpful to protect your teeth if you experience frequent dry mouth.  Dry mouth may be a sign of air escaping out of your mouth or out of the mask in the case of a full face mask.  Speak with your provider if you expect that is the case.     Take a nasal decongestant to relieve more severe nasal or sinus congestion.  Do not use Afrin (oxymetazoline) nasal spray more than 3 days in a row.  Speak with your sleep doctor if your nasal congestion is chronic.    Use a heated humidifier that fits your CPAP model to enhance your breathing comfort. Adjust the heat setting up if you get  a dry nose or throat, down if you get condensation in the hose or mask.  Position the CPAP lower than you so that any condensation in the hose drains back into the machine rather than towards the mask.    Try a system that uses nasal pillows if traditional masks give you problems.    Clean your mask, tubing and headgear once a week. Make sure the equipment dries fully.    Regularly check and replace the filters for your CPAP unit and humidifier.    Work closely with your sleep provider and your CPAP supplier to make sure that you have the machine, mask and air pressure setting that works best for you. It is better to stop using it and call your provider to solve problems than to lay awake all night frustrated with the device.      BESIDES CPAP, WHAT OTHER THERAPIES ARE THERE?        Positioning Device  Positioning devices are generally used when sleep apnea is mild and only occurs on your back.This example shows a pillow that straps around the waist. It may be appropriate for those whose sleep study shows milder sleep apnea that occurs primarily when lying flat on one's back. Preliminary studies have shown benefit but effectiveness at home may need to be verified by a home sleep test. These devices are generally not covered by medical insurance.                        Oral Appliance  What is oral appliance therapy?  An oral appliance is a small acrylic device that fits over the upper and lower teeth or tongue (similar to an orthodontic retainer or a mouth guard). This device slightly advances the lower jaw or tongue, which moves the base of the tongue forward, opens the airway, improves breathing and can effectively treat snoring and obstructive sleep apnea sleep apnea. The appliance is fabricated and customized by a qualified dentist with experience in treating snoring and sleep apnea. Oral appliances are usually well tolerated and have relatively high compliance by patients1, 2, 3.  When is an oral appliance  indicated?  Oral appliance therapy is recommended as a first-line treatment for patients with primary snoring, mild sleep apnea, and for patients with moderate sleep apnea who prefer appliance therapy to use of CPAP4, 5. Severity of sleep apnea is determined by sleep testing and is based on the number of respiratory events per hour of sleep.   How successful is oral appliance therapy?  The success rate of oral appliance therapy in patients with mild sleep apnea is 75-80% while in patients with moderate sleep apnea it is 50-70%. The chance of success in patients with severe sleep apnea is 40-50%. The research also shows that oral appliances have a beneficial effect on the cardiovascular health of KENY patients at the same magnitude as CPAP therapy7.  Oral appliances should be a second-line treatment in cases of severe sleep apnea, but if not completely successful then a combination therapy utilizing CPAP plus oral appliance therapy may be effective. Oral appliances tend to be effective in a broad range of patients although studies show that the patients who have the highest success are females, younger patients, those with milder disease, and less severe obesity. 3, 6.   The chances of success are lower in patients who have more severe KENY, are older, and those who are morbidly obese.     Example of an oral appliance   Finding a dentist that practices dental sleep medicine  Specific training is available through the American Academy of Dental Sleep Medicine for dentists interested in working in the field of sleep. To find a dentist who is educated in the field of sleep and the use of oral appliances, near you, visit the Web site of the American Academy of Dental Sleep Medicine; also see   http://www.accpstorage.org/newOrganization/patients/oralAppliances.pdf  To search for a dentist certified in these practices:  Http://aadsm.org/FindADentist.aspx?1  1. Leyda et al. Objectively measured vs self-reported  compliance during oral appliance therapy for sleep-disordered breathing. Chest 2013; 144(5): 9269-7609.  2. Kwan, et al. Objective measurement of compliance during oral appliance therapy for sleep-disordered breathing. Thorax 2013; 68(1): 91-96.  3. Efren et al. Mandibular advancement devices in 620 men and women with KENY and snoring: tolerability and predictors of treatment success. Chest 2004; 125: 9285-1068.  4. Gian et al. Oral appliances for snoring and KENY: a review. Sleep 2006; 29: 244-262.  5. Jon et al. Oral appliance treatment for KENY: an update. J Clin Sleep Med 2014; 10(2): 215-227.  6. Thomas et al. Predictors of OSAH treatment outcome. J Dent Res 2007; 86: 1747-4774.      Weight Loss:    Weight management is a personal decision.  If you are interested in exploring weight loss strategies, the following discussion covers the impact on weight loss on sleep apnea and the approaches that may be successful.    Weight loss decreases severity of sleep apnea in most people with obesity. For those with mild obesity who have developed snoring with weight gain, even 15-30 pound weight loss can improve and occasionally eliminate sleep apnea.  Structured and life-long dietary and health habits are necessary to lose weight and keep healthier weight levels.     Though there may be significant health benefits from weight loss, long-term weight loss is very difficult to achieve- studies show success with dietary management in less than 10% of people. In addition, substantial weight loss may require years of dietary control and may be difficult if patients have severe obesity. In these cases, surgical management may be considered.  Finally, older individuals who have tolerated obesity without health complications may be less likely to benefit from weight loss strategies.    Your BMI is Body mass index is 49.46 kg/(m^2).  Body mass index (BMI) is one way to tell whether you are at a healthy  weight, overweight, or obese. It measures your weight in relation to your height.  A BMI of 18.5 to 24.9 is in the healthy range. A person with a BMI of 25 to 29.9 is considered overweight, and someone with a BMI of 30 or greater is considered obese. More than two-thirds of American adults are considered overweight or obese.  Being overweight or obese increases the risk for further weight gain. Excess weight may lead to heart disease and diabetes.  Creating and following plans for healthy eating and physical activity may help you improve your health.  Weight control is part of healthy lifestyle and includes exercise, emotional health, and healthy eating habits. Careful eating habits lifelong are the mainstay of weight control. Though there are significant health benefits from weight loss, long-term weight loss with diet alone may be very difficult to achieve- studies show long-term success with dietary management in less than 10% of people. Attaining a healthy weight may be especially difficult to achieve in those with severe obesity. In some cases, medications, devices and surgical management might be considered.  What can you do?  If you are overweight or obese and are interested in methods for weight loss, you should discuss this with your provider.     Consider reducing daily calorie intake by 500 calories.     Keep a food journal.     Avoiding skipping meals, consider cutting portions instead.    Diet combined with exercise helps maintain muscle while optimizing fat loss. Strength training is particularly important for building and maintaining muscle mass. Exercise helps reduce stress, increase energy, and improves fitness. Increasing exercise without diet control, however, may not burn enough calories to loose weight.       Start walking three days a week 10-20 minutes at a time    Work towards walking thirty minutes five days a week     Eventually, increase the speed of your walking for 1-2 minutes at  time    In addition, we recommend that you review healthy lifestyles and methods for weight loss available through the National Institutes of Health patient information sites:  http://win.niddk.nih.gov/publications/index.htm    And look into health and wellness programs that may be available through your health insurance provider, employer, local community center, or john club.    Surgery:    Upper Airway Surgery for KENY  Surgery for KENY is a second-line treatment option in the management of sleep apnea.  Surgery should be considered for patients who are having a difficult time tolerating CPAP.    Surgery for KENY is directed at areas that are responsible for narrowing or complete obstruction of the airway during sleep.  There are a wide range of procedures available to enlarge and/or stabilize the airway to prevent blockage of breathing in the three major areas where it can occur: the palate, tongue, and nasal regions.  Successful surgical treatment depends on the accurate identification of the factors responsible for obstructive sleep apnea in each person.  A personalized approach is required because there is no single treatment that works well for everyone.  Because of anatomic variation, consultation with an examination by a sleep surgeon is a critical first step in determining what surgical options are best for each patient.  In some cases, examination during sedation may be recommended in order to guide the selection of procedures.  Patients will be counseled about risks and benefits as well as the typical recovery course after surgery. Surgery is typically not a cure for a person s KENY.  However, surgery will often significantly improve one s KENY severity (termed  success rate ).  Even in the absence of a cure, surgery will decrease the cardiovascular risk associated with OSA7; improve overall quality of life8 (sleepiness, functionality, sleep quality, etc).          Palate Procedures:  Patients with KENY  often have narrowing of their airway in the region of their tonsils and uvula.  The goals of palate procedures are to widen the airway in this region as well as to help the tissues resist collapse.  Modern palate procedure techniques focus on tissue conservation and soft tissue rearrangement, rather than tissue removal.  Often the uvula is preserved in this procedure. Residual sleep apnea is common in patient after pharyngoplasty with an average reduction in sleep apnea events of 33%2.      Tongue Procedures:  While patients are awake, the muscles that surround the throat are active and keep this region open for breathing. These muscles relax during sleep, allowing the tongue and other structures to collapse and block breathing.  There are several different tongue procedures available.  Selection of a tongue base procedure depends on characteristics seen on physical exam.  Generally, procedures are aimed at removing bulky tissues in this area or preventing the back of the tongue from falling back during sleep.  Success rates for tongue surgery range from 50-62%3.    Hypoglossal Nerve Stimulation:  Hypoglossal nerve stimulation has recently received approval from the [place] Food and Drug Administration for the treatment of obstructive sleep apnea.  This is based on research showing that the system was safe and effective in treating sleep apnea6.  Results showed that the median AHI score decreased 68%, from 29.3 to 9.0. This therapy uses an implant system that senses breathing patterns and delivers mild stimulation to airway muscles, which keeps the airway open during sleep.  The system consists of three fully implanted components: a small generator (similar in size to a pacemaker), a breathing sensor, and a stimulation lead.  Using a small handheld remote, a patient turns the therapy on before bed and off upon awakening.    Candidates for this device must be greater than 22 years of age, have moderate to  severe KENY (AHI between 20-65), BMI less than 32, have tried CPAP/oral appliance without success, and have appropriate upper airway anatomy (determined by a sleep endoscopy performed by Dr. Allen).    Hypoglossal Nerve Stimulation Pathway:    The sleep surgeon s office will work with the patient through the insurance prior-authorization process (including communications and appeals).    Nasal Procedures:  Nasal obstruction can interfere with nasal breathing during the day and night.  Studies have shown that relief of nasal obstruction can improve the ability of some patients to tolerate positive airway pressure therapy for obstructive sleep apnea1.  Treatment options include medications such as nasal saline, topical corticosteroid and antihistamine sprays, and oral medications such as antihistamines or decongestants. Non-surgical treatments can include external nasal dilators for selected patients. If these are not successful by themselves, surgery can improve the nasal airway either alone or in combination with these other options.    Combination Procedures:  Combination of surgical procedures and other treatments may be recommended, particularly if patients have more than one area of narrowing or persistent positional disease.  The success rate of combination surgery ranges from 66-80%2,3.      1. Samantha HONG. The Role of the Nose in Snoring and Obstructive Sleep Apnoea: An Update.  Eur Arch Otorhinolaryngol. 2011; 268: 1365-73.  2.  Wilda SM; Sena JA; Anahi JR; Pallanch JF; Doris MB; Yasmine SG; Taryn PENNINGTOND. Surgical modifications of the upper airway for obstructive sleep apnea in adults: a systematic review and meta-analysis. SLEEP 2010;33(10):3462-0406. Shaji PAIGE. Hypopharyngeal surgery in obstructive sleep apnea: an evidence-based medicine review.  Arch Otolaryngol Head Neck Surg. 2006 Feb;132(2):206-13.  3. Dominick YH1, Monica Y, Corona MARINA. The efficacy of anatomically based multilevel surgery for obstructive  sleep apnea. Otolaryngol Head Neck Surg. 2003 Oct;129(4):327-35.  4. Shaji PAIGE, Goldberg A. Hypopharyngeal Surgery in Obstructive Sleep Apnea: An Evidence-Based Medicine Review. Arch Otolaryngol Head Neck Surg. 2006 Feb;132(2):206-13.  5. Yissel SHORE et al. Upper-Airway Stimulation for Obstructive Sleep Apnea.  N Engl J Med. 2014 Jan 9;370(2):139-49.  6. Valeriy Y et al. Increased Incidence of Cardiovascular Disease in Middle-aged Men with Obstructive Sleep Apnea. Am J Respir Crit Care Med; 2002 166: 159-165  7. Lesa EM et al. Studying Life Effects and Effectiveness of Palatopharyngoplasty (SLEEP) study: Subjective Outcomes of Isolated Uvulopalatopharyngoplasty. Otolaryngol Head Neck Surg. 2011; 144: 623-631.  Please, meet with the nurse and schedule the sleep study as well as the follow up visit. Use the sleeping aid as needed during the night of the study.    Thanks!    Kenney Hector MD, MPH  Clinical Sleep and Occupational / Environmental Medicine

## 2017-08-15 NOTE — MR AVS SNAPSHOT
After Visit Summary   8/15/2017    Zenobia Cantu    MRN: [id number]           Patient Information     Date Of Birth          1990        Visit Information        Provider Department      8/15/2017 3:30 PM Kenney Hector MD [facility]        Today's Diagnoses     Snoring    -  1    Morbid obesity due to excess calories (H)        Essential hypertension        Excessive daytime sleepiness          Care Instructions      Your BMI is Body mass index is 49.46 kg/(m^2).  Weight management is a personal decision.  If you are interested in exploring weight loss strategies, the following discussion covers the approaches that may be successful. Body mass index (BMI) is one way to tell whether you are at a healthy weight, overweight, or obese. It measures your weight in relation to your height.  A BMI of 18.5 to 24.9 is in the healthy range. A person with a BMI of 25 to 29.9 is considered overweight, and someone with a BMI of 30 or greater is considered obese. More than two-thirds of American adults are considered overweight or obese.  Being overweight or obese increases the risk for further weight gain. Excess weight may lead to heart disease and diabetes.  Creating and following plans for healthy eating and physical activity may help you improve your health.  Weight control is part of healthy lifestyle and includes exercise, emotional health, and healthy eating habits. Careful eating habits lifelong are the mainstay of weight control. Though there are significant health benefits from weight loss, long-term weight loss with diet alone may be very difficult to achieve- studies show long-term success with dietary management in less than 10% of people. Attaining a healthy weight may be especially difficult to achieve in those with severe obesity. In some cases, medications, devices and surgical management might be considered.  What can you do?  If you are overweight or obese and are  "interested in methods for weight loss, you should discuss this with your provider.     Consider reducing daily calorie intake by 500 calories.     Keep a food journal.     Avoiding skipping meals, consider cutting portions instead.    Diet combined with exercise helps maintain muscle while optimizing fat loss. Strength training is particularly important for building and maintaining muscle mass. Exercise helps reduce stress, increase energy, and improves fitness. Increasing exercise without diet control, however, may not burn enough calories to loose weight.       Start walking three days a week 10-20 minutes at a time    Work towards walking thirty minutes five days a week     Eventually, increase the speed of your walking for 1-2 minutes at time    In addition, we recommend that you review healthy lifestyles and methods for weight loss available through the National Institutes of Health patient information sites:  http://win.niddk.nih.gov/publications/index.htm    And look into health and wellness programs that may be available through your health insurance provider, employer, local community center, or john club.    Weight management plan: Patient was referred to their PCP to discuss a diet and exercise plan.       MY TREATMENT INFORMATION FOR SLEEP APNEA -  Zenobia Cantu    DOCTOR: Kenney Hector MD, MPH  SLEEP CENTER : [facility]         If I haven't had a sleep study yet, what can I expect?  A personal story from Pradeep  https://www.youSinosun Technologyube.com/watch?v=AxPLmlRpnCs      Am I having a home sleep study?  Here is a video in case you get home and want to make sure you have done it correctly  https://www.SelSaharaube.com/watch?v=QYW7H2gXox1&feature=youtu.be      Frequently asked questions:  1. What is Obstructive Sleep Apnea (KENY)? KENY is the most common type of sleep apnea. Apnea literally means, \"without breath.\" It is characterized by repetitive pauses in breathing, despite continued effort to " breathe, and is usually associated with a reduction in blood oxygen saturation. Apneas can last 10 to over 60 seconds. It is caused by narrowing or collapse of the upper airway as muscles relax during sleep. Severity of sleep apnea is determined by frequency of breathing events and their effect on your sleep and oxygen levels determined during sleep testing.     2. What are the consequences of KENY? Symptoms include: daytime sleepiness- possibly increasing the risk of falling asleep while driving, unrefreshing/restless sleep, snoring, insomnia, waking frequently to urinate, waking with heartburn or reflux, reduced concentration and memory, and morning headaches. Other health consequences may include development of high blood pressure and other cardiovascular disease in persons who are susceptible. Untreated KENY  can contribute to heart disease, stroke and diabetes.     3. What are the treatment options? In most situations, sleep apnea is a lifelong disease that must be managed with daily therapy. Medications are not effective for sleep apnea and surgery is generally not performed until other therapies have been tried. Therapy is usually tailored to the individual patient based on many factors including your wishes as well as severity of sleep apnea and severity of obesity. Continuous Positive Airway (CPAP) is the most reliable treatment. An oral device to hold your jaw forward is usually the next most reliable option. Other options include postioning devices (to keep you off your back), weight loss, and surgery including a tongue pacing device. There is more detail about some of these options below.            1. CPAP-  WHAT DOES IT DO AND HOW CAN I LEARN TO WEAR IT?                               BEFORE I START, CAN I WATCH A MOVIE TO GET A PLAN ON HOW TO USE CPAP?  https://www.youFourthWall [employer].com/watch?s=f9V52ru528V      Continuous positive airway pressure, or CPAP, is the most effective treatment for obstructive sleep  "apnea. It works by blowing room air, through a mask, to hold your throat open. A decision to use CPAP is a major step forward in the pursuit of a healthier life. The successful use of CPAP will help you breathe easier, sleep better and live healthier. You can choose CPAP equipment from any durable medical equipment provider that meets your needs.  Using CPAP can be a positive experience if you keep these encinas points in mind:  1. Commitment  CPAP is not a quick fix for your problem. It involves a long-term commitment to improve your sleep and your health.    2. Communication  Stay in close communication with both your sleep doctor and your CPAP supplier. Ask lots of questions and seek help when you need it.    3. Consistency  Use CPAP all night, every night and for every nap. You will receive the maximum health benefits from CPAP when you use it every time that you sleep. This will also make it easier for your body to adjust to the treatment.    4. Correction  The first machine and mask that you try may not be the best ones for you. Work with your sleep doctor and your CPAP supplier to make corrections to your equipment selection. Ask about trying a different type of machine or mask if you have ongoing problems. Make sure that your mask is a good fit and learn to use your equipment properly.    5. Challenge  Tell a family member or close friend to ask you each morning if you used your CPAP the previous night. Have someone to challenge you to give it your best effort.    6. Connection   Your adjustment to CPAP will be easier if you are able to connect with others who use the same treatment. Ask your sleep doctor if there is a support group in your area for people who have sleep apnea, or look for one on the Internet.  7. Comfort   Increase your level of comfort by using a saline spray, decongestant or heated humidifier if CPAP irritates your nose, mouth or throat. Use your unit's \"ramp\" setting to slowly get used to " the air pressure level. There may be soft pads you can buy that will fit over your mask straps. Look on www.CPAP.com for accessories that can help make CPAP use more comfortable.  8. Cleaning   Clean your mask, tubing and headgear on a regular basis. Put this time in your schedule so that you don't forget to do it. Check and replace the filters for your CPAP unit and humidifier.    9. Completion   Although you are never finished with CPAP therapy, you should reward yourself by celebrating the completion of your first month of treatment. Expect this first month to be your hardest period of adjustment. It will involve some trial and error as you find the machine, mask and pressure settings that are right for you.    10. Continuation  After your first month of treatment, continue to make a daily commitment to use your CPAP all night, every night and for every nap.    CPAP-Tips to starting with success:  Begin using your CPAP for short periods of time during the day while you watch TV or read.    Use CPAP every night and for every nap. Using it less often reduces the health benefits and makes it harder for your body to get used to it.    Make small adjustments to your mask, tubing, straps and headgear until you get the right fit. Tightening the mask may actually worsen the leak.  If it leaves significant marks on your face or irritates the bridge of your nose, it may not be the best mask for you.  Speak with the person who supplied the mask and consider trying other masks. Insurances will allow you to try different masks during the first month of starting CPAP.  Insurance also covers a new mask, hose and filter about every 6 months.    Use a saline nasal spray to ease mild nasal congestion. Neti-Pot or saline nasal rinses may also help. Nasal gel sprays can help reduce nasal dryness.  Biotene mouthwash can be helpful to protect your teeth if you experience frequent dry mouth.  Dry mouth may be a sign of air escaping out  of your mouth or out of the mask in the case of a full face mask.  Speak with your provider if you expect that is the case.     Take a nasal decongestant to relieve more severe nasal or sinus congestion.  Do not use Afrin (oxymetazoline) nasal spray more than 3 days in a row.  Speak with your sleep doctor if your nasal congestion is chronic.    Use a heated humidifier that fits your CPAP model to enhance your breathing comfort. Adjust the heat setting up if you get a dry nose or throat, down if you get condensation in the hose or mask.  Position the CPAP lower than you so that any condensation in the hose drains back into the machine rather than towards the mask.    Try a system that uses nasal pillows if traditional masks give you problems.    Clean your mask, tubing and headgear once a week. Make sure the equipment dries fully.    Regularly check and replace the filters for your CPAP unit and humidifier.    Work closely with your sleep provider and your CPAP supplier to make sure that you have the machine, mask and air pressure setting that works best for you. It is better to stop using it and call your provider to solve problems than to lay awake all night frustrated with the device.      BESIDES CPAP, WHAT OTHER THERAPIES ARE THERE?        Positioning Device  Positioning devices are generally used when sleep apnea is mild and only occurs on your back.This example shows a pillow that straps around the waist. It may be appropriate for those whose sleep study shows milder sleep apnea that occurs primarily when lying flat on one's back. Preliminary studies have shown benefit but effectiveness at home may need to be verified by a home sleep test. These devices are generally not covered by medical insurance.                        Oral Appliance  What is oral appliance therapy?  An oral appliance is a small acrylic device that fits over the upper and lower teeth or tongue (similar to an orthodontic retainer or a mouth  guard). This device slightly advances the lower jaw or tongue, which moves the base of the tongue forward, opens the airway, improves breathing and can effectively treat snoring and obstructive sleep apnea sleep apnea. The appliance is fabricated and customized by a qualified dentist with experience in treating snoring and sleep apnea. Oral appliances are usually well tolerated and have relatively high compliance by patients1, 2, 3.  When is an oral appliance indicated?  Oral appliance therapy is recommended as a first-line treatment for patients with primary snoring, mild sleep apnea, and for patients with moderate sleep apnea who prefer appliance therapy to use of CPAP4, 5. Severity of sleep apnea is determined by sleep testing and is based on the number of respiratory events per hour of sleep.   How successful is oral appliance therapy?  The success rate of oral appliance therapy in patients with mild sleep apnea is 75-80% while in patients with moderate sleep apnea it is 50-70%. The chance of success in patients with severe sleep apnea is 40-50%. The research also shows that oral appliances have a beneficial effect on the cardiovascular health of KENY patients at the same magnitude as CPAP therapy7.  Oral appliances should be a second-line treatment in cases of severe sleep apnea, but if not completely successful then a combination therapy utilizing CPAP plus oral appliance therapy may be effective. Oral appliances tend to be effective in a broad range of patients although studies show that the patients who have the highest success are females, younger patients, those with milder disease, and less severe obesity. 3, 6.   The chances of success are lower in patients who have more severe KENY, are older, and those who are morbidly obese.     Example of an oral appliance   Finding a dentist that practices dental sleep medicine  Specific training is available through the American Academy of Dental Sleep Medicine for  dentists interested in working in the field of sleep. To find a dentist who is educated in the field of sleep and the use of oral appliances, near you, visit the Web site of the American Academy of Dental Sleep Medicine; also see   http://www.accpstorage.org/newOrganization/patients/oralAppliances.pdf  To search for a dentist certified in these practices:  Http://aadsm.org/FindADentist.aspx?1  1. Leyda et al. Objectively measured vs self-reported compliance during oral appliance therapy for sleep-disordered breathing. Chest 2013; 144(5): 3104-5916.  2. Kwan, et al. Objective measurement of compliance during oral appliance therapy for sleep-disordered breathing. Thorax 2013; 68(1): 91-96.  3. Efren et al. Mandibular advancement devices in 620 men and women with KENY and snoring: tolerability and predictors of treatment success. Chest 2004; 125: 2923-7499.  4. Gian, et al. Oral appliances for snoring and KENY: a review. Sleep 2006; 29: 244-262.  5. Jon et al. Oral appliance treatment for KENY: an update. J Clin Sleep Med 2014; 10(2): 215-227.  6. Thomas et al. Predictors of OSAH treatment outcome. J Dent Res 2007; 86: 8873-8177.      Weight Loss:    Weight management is a personal decision.  If you are interested in exploring weight loss strategies, the following discussion covers the impact on weight loss on sleep apnea and the approaches that may be successful.    Weight loss decreases severity of sleep apnea in most people with obesity. For those with mild obesity who have developed snoring with weight gain, even 15-30 pound weight loss can improve and occasionally eliminate sleep apnea.  Structured and life-long dietary and health habits are necessary to lose weight and keep healthier weight levels.     Though there may be significant health benefits from weight loss, long-term weight loss is very difficult to achieve- studies show success with dietary management in less than 10% of  people. In addition, substantial weight loss may require years of dietary control and may be difficult if patients have severe obesity. In these cases, surgical management may be considered.  Finally, older individuals who have tolerated obesity without health complications may be less likely to benefit from weight loss strategies.    Your BMI is Body mass index is 49.46 kg/(m^2).  Body mass index (BMI) is one way to tell whether you are at a healthy weight, overweight, or obese. It measures your weight in relation to your height.  A BMI of 18.5 to 24.9 is in the healthy range. A person with a BMI of 25 to 29.9 is considered overweight, and someone with a BMI of 30 or greater is considered obese. More than two-thirds of American adults are considered overweight or obese.  Being overweight or obese increases the risk for further weight gain. Excess weight may lead to heart disease and diabetes.  Creating and following plans for healthy eating and physical activity may help you improve your health.  Weight control is part of healthy lifestyle and includes exercise, emotional health, and healthy eating habits. Careful eating habits lifelong are the mainstay of weight control. Though there are significant health benefits from weight loss, long-term weight loss with diet alone may be very difficult to achieve- studies show long-term success with dietary management in less than 10% of people. Attaining a healthy weight may be especially difficult to achieve in those with severe obesity. In some cases, medications, devices and surgical management might be considered.  What can you do?  If you are overweight or obese and are interested in methods for weight loss, you should discuss this with your provider.     Consider reducing daily calorie intake by 500 calories.     Keep a food journal.     Avoiding skipping meals, consider cutting portions instead.    Diet combined with exercise helps maintain muscle while optimizing  fat loss. Strength training is particularly important for building and maintaining muscle mass. Exercise helps reduce stress, increase energy, and improves fitness. Increasing exercise without diet control, however, may not burn enough calories to loose weight.       Start walking three days a week 10-20 minutes at a time    Work towards walking thirty minutes five days a week     Eventually, increase the speed of your walking for 1-2 minutes at time    In addition, we recommend that you review healthy lifestyles and methods for weight loss available through the National Institutes of Health patient information sites:  http://win.niddk.nih.gov/publications/index.htm    And look into health and wellness programs that may be available through your health insurance provider, employer, local community center, or john club.    Surgery:    Upper Airway Surgery for KENY  Surgery for KENY is a second-line treatment option in the management of sleep apnea.  Surgery should be considered for patients who are having a difficult time tolerating CPAP.    Surgery for KENY is directed at areas that are responsible for narrowing or complete obstruction of the airway during sleep.  There are a wide range of procedures available to enlarge and/or stabilize the airway to prevent blockage of breathing in the three major areas where it can occur: the palate, tongue, and nasal regions.  Successful surgical treatment depends on the accurate identification of the factors responsible for obstructive sleep apnea in each person.  A personalized approach is required because there is no single treatment that works well for everyone.  Because of anatomic variation, consultation with an examination by a sleep surgeon is a critical first step in determining what surgical options are best for each patient.  In some cases, examination during sedation may be recommended in order to guide the selection of procedures.  Patients will be counseled about  risks and benefits as well as the typical recovery course after surgery. Surgery is typically not a cure for a person s KENY.  However, surgery will often significantly improve one s KENY severity (termed  success rate ).  Even in the absence of a cure, surgery will decrease the cardiovascular risk associated with OSA7; improve overall quality of life8 (sleepiness, functionality, sleep quality, etc).          Palate Procedures:  Patients with KENY often have narrowing of their airway in the region of their tonsils and uvula.  The goals of palate procedures are to widen the airway in this region as well as to help the tissues resist collapse.  Modern palate procedure techniques focus on tissue conservation and soft tissue rearrangement, rather than tissue removal.  Often the uvula is preserved in this procedure. Residual sleep apnea is common in patient after pharyngoplasty with an average reduction in sleep apnea events of 33%2.      Tongue Procedures:  While patients are awake, the muscles that surround the throat are active and keep this region open for breathing. These muscles relax during sleep, allowing the tongue and other structures to collapse and block breathing.  There are several different tongue procedures available.  Selection of a tongue base procedure depends on characteristics seen on physical exam.  Generally, procedures are aimed at removing bulky tissues in this area or preventing the back of the tongue from falling back during sleep.  Success rates for tongue surgery range from 50-62%3.    Hypoglossal Nerve Stimulation:  Hypoglossal nerve stimulation has recently received approval from the [place] Food and Drug Administration for the treatment of obstructive sleep apnea.  This is based on research showing that the system was safe and effective in treating sleep apnea6.  Results showed that the median AHI score decreased 68%, from 29.3 to 9.0. This therapy uses an implant system that senses  breathing patterns and delivers mild stimulation to airway muscles, which keeps the airway open during sleep.  The system consists of three fully implanted components: a small generator (similar in size to a pacemaker), a breathing sensor, and a stimulation lead.  Using a small handheld remote, a patient turns the therapy on before bed and off upon awakening.    Candidates for this device must be greater than 22 years of age, have moderate to severe KENY (AHI between 20-65), BMI less than 32, have tried CPAP/oral appliance without success, and have appropriate upper airway anatomy (determined by a sleep endoscopy performed by Dr. Allen).    Hypoglossal Nerve Stimulation Pathway:    The sleep surgeon s office will work with the patient through the insurance prior-authorization process (including communications and appeals).    Nasal Procedures:  Nasal obstruction can interfere with nasal breathing during the day and night.  Studies have shown that relief of nasal obstruction can improve the ability of some patients to tolerate positive airway pressure therapy for obstructive sleep apnea1.  Treatment options include medications such as nasal saline, topical corticosteroid and antihistamine sprays, and oral medications such as antihistamines or decongestants. Non-surgical treatments can include external nasal dilators for selected patients. If these are not successful by themselves, surgery can improve the nasal airway either alone or in combination with these other options.    Combination Procedures:  Combination of surgical procedures and other treatments may be recommended, particularly if patients have more than one area of narrowing or persistent positional disease.  The success rate of combination surgery ranges from 66-80%2,3.      1. Samantha HONG. The Role of the Nose in Snoring and Obstructive Sleep Apnoea: An Update.  Eur Arch Otorhinolaryngol. 2011; 268: 1365-73.  2.  Wilda BLACK; Sena MAC; Anahi MEZA; Pallanch  JF; Doris RAMIREZ; Yasmine BRYANT; Taryn CARREON. Surgical modifications of the upper airway for obstructive sleep apnea in adults: a systematic review and meta-analysis. SLEEP 2010;33(10):6618-1628. Shaji APIGE. Hypopharyngeal surgery in obstructive sleep apnea: an evidence-based medicine review.  Arch Otolaryngol Head Neck Surg. 2006 Feb;132(2):206-13.  3. Dominick YH1, Monica Y, Corona GRAY. The efficacy of anatomically based multilevel surgery for obstructive sleep apnea. Otolaryngol Head Neck Surg. 2003 Oct;129(4):327-35.  4. Shaji PAIGE, Goldberg A. Hypopharyngeal Surgery in Obstructive Sleep Apnea: An Evidence-Based Medicine Review. Arch Otolaryngol Head Neck Surg. 2006 Feb;132(2):206-13.  5. Yissel PJ et al. Upper-Airway Stimulation for Obstructive Sleep Apnea.  N Engl J Med. 2014 Jan 9;370(2):139-49.  6. Valeriy Y et al. Increased Incidence of Cardiovascular Disease in Middle-aged Men with Obstructive Sleep Apnea. Am J Respir Crit Care Med; 2002 166: 159-165  7. Mcfadden EM et al. Studying Life Effects and Effectiveness of Palatopharyngoplasty (SLEEP) study: Subjective Outcomes of Isolated Uvulopalatopharyngoplasty. Otolaryngol Head Neck Surg. 2011; 144: 623-631.  Please, meet with the nurse and schedule the sleep study as well as the follow up visit. Use the sleeping aid as needed during the night of the study.    Thanks!    Kenney Hector MD, MPH  Clinical Sleep and Occupational / Environmental Medicine            Follow-ups after your visit        Your next 10 appointments already scheduled     Aug 28, 2017  3:20 PM CDT   SHORT with Joe Ramirez PA-C   [facility]    [address] [place] [address], [address]  [facility] [address] [phone]            Sep 27, 2017 10:30 AM CDT   [facility] Visit with [facility]   [facility] ([facility])    [address] [place] [address] [phone]              Future tests  "that were ordered for you today     Open Future Orders        Priority Expected Expires Ordered    Comprehensive Sleep Study Routine  2/11/2018 8/15/2017    ABG-Blood Gas Arterial ([place] / [place]) Routine  10/14/2017 8/15/2017            Who to contact     If you have questions or need follow up information about today's clinic visit or your schedule please contact [facility] directly at [phone].  Normal or non-critical lab and imaging results will be communicated to you by LoudClickhart, letter or phone within 4 business days after the clinic has received the results. If you do not hear from us within 7 days, please contact the clinic through Make Meaning or phone. If you have a critical or abnormal lab result, we will notify you by phone as soon as possible.  Submit refill requests through Make Meaning or call your pharmacy and they will forward the refill request to us. Please allow 3 business days for your refill to be completed.          Additional Information About Your Visit        Make Meaning Information     Make Meaning gives you secure access to your electronic health record. If you see a primary care provider, you can also send messages to your care team and make appointments. If you have questions, please call your primary care clinic.  If you do not have a primary care provider, please call [phone] and they will assist you.        Care EveryWhere ID     This is your Care EveryWhere ID. This could be used by other organizations to access your [facility] medical records  [id number]        Your Vitals Were     Pulse Respirations Height Last Period Pulse Oximetry BMI (Body Mass Index)    84 18 1.867 m (6' 1.5\") 07/08/2017 (Approximate) 97% 49.46 kg/m2       Blood Pressure from Last 3 Encounters:   08/15/17 (!) 157/99   08/09/17 138/80   08/04/17 180/86    Weight from Last 3 Encounters:   08/15/17 (!) 172.4 kg (380 lb)   08/02/17 (!) 174.1 kg (383 lb 14.4 oz)   07/31/17 (!) 173.5 kg (382 " lb 9.6 oz)                 Today's Medication Changes          These changes are accurate as of: 8/15/17  4:32 PM.  If you have any questions, ask your nurse or doctor.               Start taking these medicines.        Dose/Directions    zolpidem 5 MG tablet   Commonly known as:  AMBIEN   Used for:  Excessive daytime sleepiness, Essential hypertension, Morbid obesity due to excess calories (H), Snoring   Started by:  Kenney Hector MD        Take tablet by mouth 15 minutes prior to sleep, for Sleep Study   Quantity:  1 tablet   Refills:  0         These medicines have changed or have updated prescriptions.        Dose/Directions    amLODIPine 5 MG tablet   Commonly known as:  NORVASC   This may have changed:  how much to take   Used for:  Benign essential hypertension        Dose:  5 mg   Take 1 tablet (5 mg) by mouth daily   Quantity:  30 tablet   Refills:  1            Where to get your medicines      Some of these will need a paper prescription and others can be bought over the counter.  Ask your nurse if you have questions.     Bring a paper prescription for each of these medications     zolpidem 5 MG tablet                Primary Care Provider Office Phone # Fax #    Joe Ramirez PA-C [phone]       [address]  [address] [facility] [address]        Equal Access to Services     JOHN RAMIREZ AH: Hadii valerio ku hadasho Soomaali, waaxda luqadaha, qaybta kaalmada adeegyada, waxay lilibeth hayamien jaden lemos. So [facility] [phone].    ATENCIÓN: Si habla español, tiene a prescott disposición servicios gratuitos de asistencia lingüística. Marcio al [phone].    We comply with applicable federal civil rights laws and [place] laws. We do not discriminate on the basis of race, color, national origin, age, disability sex, sexual orientation or gender identity.            Thank you!     Thank you for choosing [facility]  for your care. Our goal is always to provide you  with excellent care. Hearing back from our patients is one way we can continue to improve our services. Please take a few minutes to complete the written survey that you may receive in the mail after your visit with us. Thank you!             Your Updated Medication List - Protect others around you: Learn how to safely use, store and throw away your medicines at www.disposemymeds.org.          This list is accurate as of: 8/15/17  4:32 PM.  Always use your most recent med list.                   Brand Name Dispense Instructions for use Diagnosis    amLODIPine 5 MG tablet    NORVASC    30 tablet    Take 1 tablet (5 mg) by mouth daily    Benign essential hypertension       FLUoxetine 40 MG capsule    PROzac     TK 1 C PO D        metFORMIN 500 MG 24 hr tablet    GLUCOPHAGE-XR    60 tablet    Take 1 tablet (500 mg) by mouth 2 times daily (with meals)    IFG (impaired fasting glucose)       norgestim-eth estrad triphasic 0.18/0.215/0.25 MG-25 MCG per tablet    ORTHO TRI-CYCLEN LO    84 tablet    Take 1 tablet by mouth daily    Encounter for surveillance of contraceptive pills       zolpidem 5 MG tablet    AMBIEN    1 tablet    Take tablet by mouth 15 minutes prior to sleep, for Sleep Study    Excessive daytime sleepiness, Essential hypertension, Morbid obesity due to excess calories (H), Snoring

## 2017-08-15 NOTE — NURSING NOTE
"Chief Complaint   Patient presents with     Consult     Ref by Dr Darya Fuentes,  Snore per , wakes self up gasping for air,  tired during the day.   No SS or Cpap       Initial BP (!) 157/99  Pulse 84  Resp 18  Ht 1.867 m (6' 1.5\")  Wt (!) 172.4 kg (380 lb)  LMP 07/08/2017 (Approximate)  SpO2 97%  BMI 49.46 kg/m2 Estimated body mass index is 49.46 kg/(m^2) as calculated from the following:    Height as of this encounter: 1.867 m (6' 1.5\").    Weight as of this encounter: 172.4 kg (380 lb).  Medication Reconciliation: complete       Neck 44cm  17.25in  Ess 11    Elsy Leggett LPN/SAMANTHA  "

## 2017-08-16 LAB
COLLECT TME SPEC: NORMAL
CORTIS SAL-MCNC: 0.03 UG/DL

## 2017-08-23 ENCOUNTER — MYC MEDICAL ADVICE (OUTPATIENT)
Dept: ENDOCRINOLOGY | Facility: CLINIC | Age: 27
End: 2017-08-23

## 2017-08-23 NOTE — PROGRESS NOTES
Zenobia,  Your cortisol levels are both normal, saliva and blood levels.  Your insulin measure was not elevated.  I'll see you soon at your next follow up visit.  Darya Fuentes NP  Endocrinology

## 2017-08-24 NOTE — TELEPHONE ENCOUNTER
Patient inquiring on when next appointment is due. Reviewed LOV notes, sent patient a g2One message response.

## 2017-08-28 ENCOUNTER — OFFICE VISIT (OUTPATIENT)
Dept: FAMILY MEDICINE | Facility: CLINIC | Age: 27
End: 2017-08-28
Payer: COMMERCIAL

## 2017-08-28 VITALS
RESPIRATION RATE: 16 BRPM | WEIGHT: 293 LBS | TEMPERATURE: 98.8 F | DIASTOLIC BLOOD PRESSURE: 78 MMHG | BODY MASS INDEX: 49.46 KG/M2 | HEART RATE: 80 BPM | SYSTOLIC BLOOD PRESSURE: 162 MMHG

## 2017-08-28 DIAGNOSIS — B96.89 BV (BACTERIAL VAGINOSIS): ICD-10-CM

## 2017-08-28 DIAGNOSIS — N76.0 BV (BACTERIAL VAGINOSIS): ICD-10-CM

## 2017-08-28 DIAGNOSIS — R35.0 URINARY FREQUENCY: ICD-10-CM

## 2017-08-28 DIAGNOSIS — I10 ESSENTIAL HYPERTENSION: Primary | ICD-10-CM

## 2017-08-28 PROBLEM — R03.0 ELEVATED BLOOD PRESSURE READING WITHOUT DIAGNOSIS OF HYPERTENSION: Status: RESOLVED | Noted: 2017-07-25 | Resolved: 2017-08-28

## 2017-08-28 LAB
ALBUMIN UR-MCNC: NEGATIVE MG/DL
APPEARANCE UR: CLEAR
BILIRUB UR QL STRIP: NEGATIVE
COLOR UR AUTO: YELLOW
GLUCOSE UR STRIP-MCNC: NEGATIVE MG/DL
HGB UR QL STRIP: NEGATIVE
KETONES UR STRIP-MCNC: 15 MG/DL
LEUKOCYTE ESTERASE UR QL STRIP: NEGATIVE
NITRATE UR QL: NEGATIVE
PH UR STRIP: 5.5 PH (ref 5–7)
SOURCE: ABNORMAL
SP GR UR STRIP: >1.03 (ref 1–1.03)
SPECIMEN SOURCE: ABNORMAL
UROBILINOGEN UR STRIP-ACNC: 0.2 EU/DL (ref 0.2–1)
WET PREP SPEC: ABNORMAL

## 2017-08-28 PROCEDURE — 99214 OFFICE O/P EST MOD 30 MIN: CPT | Performed by: PHYSICIAN ASSISTANT

## 2017-08-28 PROCEDURE — 81003 URINALYSIS AUTO W/O SCOPE: CPT | Performed by: PHYSICIAN ASSISTANT

## 2017-08-28 PROCEDURE — 87210 SMEAR WET MOUNT SALINE/INK: CPT | Performed by: PHYSICIAN ASSISTANT

## 2017-08-28 RX ORDER — AMLODIPINE BESYLATE 10 MG/1
10 TABLET ORAL DAILY
Qty: 90 TABLET | Refills: 1 | Status: SHIPPED | OUTPATIENT
Start: 2017-08-28 | End: 2018-03-19

## 2017-08-28 RX ORDER — METRONIDAZOLE 500 MG/1
500 TABLET ORAL 2 TIMES DAILY
Qty: 14 TABLET | Refills: 0 | Status: SHIPPED | OUTPATIENT
Start: 2017-08-28 | End: 2017-09-13

## 2017-08-28 RX ORDER — PROPRANOLOL HYDROCHLORIDE 80 MG/1
80 CAPSULE, EXTENDED RELEASE ORAL DAILY
Qty: 30 CAPSULE | Refills: 1 | Status: SHIPPED | OUTPATIENT
Start: 2017-08-28 | End: 2017-10-03

## 2017-08-28 NOTE — LETTER
My Depression Action Plan  Name: Zenobia Cantu   Date of Birth 1990  Date: 8/28/2017    My doctor: Joe Ramirez   My clinic: [address], [address]  [facility] [address] [phone]          GREEN    ZONE   Good Control    What it looks like:     Things are going generally well. You have normal up s and down s. You may even feel depressed from time to time, but bad moods usually last less than a day.   What you need to do:  1. Continue to care for yourself (see self care plan)  2. Check your depression survival kit and update it as needed  3. Follow your physician s recommendations including any medication.  4. Do not stop taking medication unless you consult with your physician first.           YELLOW         ZONE Getting Worse    What it looks like:     Depression is starting to interfere with your life.     It may be hard to get out of bed; you may be starting to isolate yourself from others.    Symptoms of depression are starting to last most all day and this has happened for several days.     You may have suicidal thoughts but they are not constant.   What you need to do:     1. Call your care team, your response to treatment will improve if you keep your care team informed of your progress. Yellow periods are signs an adjustment may need to be made.     2. Continue your self-care, even if you have to fake it!    3. Talk to someone in your support network    4. Open up your depression survival kit           RED    ZONE Medical Alert - Get Help    What it looks like:     Depression is seriously interfering with your life.     You may experience these or other symptoms: You can t get out of bed most days, can t work or engage in other necessary activities, you have trouble taking care of basic hygiene, or basic responsibilities, thoughts of suicide or death that will not go away, self-injurious behavior.     What you need to do:  1. Call your  care team and request a same-day appointment. If they are not available (weekends or after hours) call your local crisis line, emergency room or 911.      Electronically signed by: Olivia Kelly, August 28, 2017    Depression Self Care Plan / Survival Kit    Self-Care for Depression  Here s the deal. Your body and mind are really not as separate as most people think.  What you do and think affects how you feel and how you feel influences what you do and think. This means if you do things that people who feel good do, it will help you feel better.  Sometimes this is all it takes.  There is also a place for medication and therapy depending on how severe your depression is, so be sure to consult with your medical provider and/ or Behavioral Health Consultant if your symptoms are worsening or not improving.     In order to better manage my stress, I will:    Exercise  Get some form of exercise, every day. This will help reduce pain and release endorphins, the  feel good  chemicals in your brain. This is almost as good as taking antidepressants!  This is not the same as joining a gym and then never going! (they count on that by the way ) It can be as simple as just going for a walk or doing some gardening, anything that will get you moving.      Hygiene   Maintain good hygiene (Get out of bed in the morning, Make your bed, Brush your teeth, Take a shower, and Get dressed like you were going to work, even if you are unemployed).  If your clothes don't fit try to get ones that do.    Diet  I will strive to eat foods that are good for me, drink plenty of water, and avoid excessive sugar, caffeine, alcohol, and other mood-altering substances.  Some foods that are helpful in depression are: complex carbohydrates, B vitamins, flaxseed, fish or fish oil, fresh fruits and vegetables.    Psychotherapy  I agree to participate in Individual Therapy (if recommended).    Medication  If prescribed medications, I agree to take them.   Missing doses can result in serious side effects.  I understand that drinking alcohol, or other illicit drug use, may cause potential side effects.  I will not stop my medication abruptly without first discussing it with my provider.    Staying Connected With Others  I will stay in touch with my friends, family members, and my primary care provider/team.    Use your imagination  Be creative.  We all have a creative side; it doesn t matter if it s oil painting, sand castles, or mud pies! This will also kick up the endorphins.    Witness Beauty  (AKA stop and smell the roses) Take a look outside, even in mid-winter. Notice colors, textures. Watch the squirrels and birds.     Service to others  Be of service to others.  There is always someone else in need.  By helping others we can  get out of ourselves  and remember the really important things.  This also provides opportunities for practicing all the other parts of the program.    Humor  Laugh and be silly!  Adjust your TV habits for less news and crime-drama and more comedy.    Control your stress  Try breathing deep, massage therapy, biofeedback, and meditation. Find time to relax each day.     My support system    Clinic Contact:  Phone number:    Contact 1:  Phone number:    Contact 2:  Phone number:    Worship/:  Phone number:    Therapist:  Phone number:    Local crisis center:    Phone number:    Other community support:  Phone number:

## 2017-08-28 NOTE — MR AVS SNAPSHOT
After Visit Summary   8/28/2017    Zenobia Cantu    MRN: [id number]           Patient Information     Date Of Birth          1990        Visit Information        Provider Department      8/28/2017 3:20 PM Joe Ramirez PA-C [facility]        Today's Diagnoses     Essential hypertension    -  1    Urinary frequency        BV (bacterial vaginosis)           Follow-ups after your visit        Follow-up notes from your care team     Return in about 1 month (around 9/28/2017).      Your next 10 appointments already scheduled     Sep 22, 2017  8:30 PM CDT   Psg Split W/Tcm with BED 5 SH SLEEP   [facility] ([facility])    [address]  [facility] [address] [phone]            Sep 27, 2017 10:30 AM CDT   New Visit with FARAZ Richmond   [facility]    [address] [facility] [address] [phone]            Sep 29, 2017  4:30 PM CDT   Return Sleep Patient with Kenney Hector MD   [facility]    [address]  [facility] [address] [phone]              Who to contact     If you have questions or need follow up information about today's clinic visit or your schedule please contact [facility] directly at [phone].  Normal or non-critical lab and imaging results will be communicated to you by MyChart, letter or phone within 4 business days after the clinic has received the results. If you do not hear from us within 7 days, please contact the clinic through MyChart or phone. If you have a critical or abnormal lab result, we will notify you by phone as soon as possible.  Submit refill requests through 3Nod or call your pharmacy and they will forward the refill request to us. Please allow 3 business days for your refill to be completed.           Additional Information About Your Visit        Akanoohart Information     Scanadu gives you secure access to your electronic health record. If you see a primary care provider, you can also send messages to your care team and make appointments. If you have questions, please call your primary care clinic.  If you do not have a primary care provider, please call [phone] and they will assist you.        Care EveryWhere ID     This is your Care EveryWhere ID. This could be used by other organizations to access your [facility] medical records  [id number]        Your Vitals Were     Pulse Temperature Respirations Last Period BMI (Body Mass Index)       80 98.8  F (37.1  C) (Oral) 16 07/08/2017 (Approximate) 49.46 kg/m2        Blood Pressure from Last 3 Encounters:   08/28/17 162/78   08/15/17 (!) 157/99   08/09/17 138/80    Weight from Last 3 Encounters:   08/28/17 (!) 380 lb (172.4 kg)   08/15/17 (!) 380 lb (172.4 kg)   08/02/17 (!) 383 lb 14.4 oz (174.1 kg)              We Performed the Following     *UA reflex to Microscopic and Culture ([place] and [facility] (except [place] and [place])     DEPRESSION ACTION PLAN (DAP)     Wet prep          Today's Medication Changes          These changes are accurate as of: 8/28/17  4:21 PM.  If you have any questions, ask your nurse or doctor.               Start taking these medicines.        Dose/Directions    metroNIDAZOLE 500 MG tablet   Commonly known as:  FLAGYL   Used for:  BV (bacterial vaginosis)   Started by:  Joe Ramirez PA-C        Dose:  500 mg   Take 1 tablet (500 mg) by mouth 2 times daily   Quantity:  14 tablet   Refills:  0       propranolol 80 MG 24 hr capsule   Commonly known as:  INDERAL LA   Used for:  Essential hypertension   Started by:  Joe Ramirez PA-C        Dose:  80 mg   Take 1 capsule (80 mg) by mouth daily   Quantity:  30 capsule   Refills:  1            Where to get your medicines      These medications were sent  to [facility] - [place], [place] - [address]  [address] [place] [address] [facility] [address]     Phone:  [phone]     amLODIPine 10 MG tablet    metroNIDAZOLE 500 MG tablet    propranolol 80 MG 24 hr capsule                Primary Care Provider Office Phone # Fax #    Joe Katie Ramirez PA-C [phone]       [address]  [facility] [address]        Equal Access to Services     DAVY RAMIREZ AH: Hadii aad ku hadasho Soomaali, waaxda luqadaha, qaybta kaalmada adeegyada, waxay idiin hayaan adeeg kharash ladaniel lemos. So [facility] [phone].    ATENCIÓN: Si habla español, tiene a prescott disposición servicios gratuitos de asistencia lingüística. Llame al [phone].    We comply with applicable federal civil rights laws and [place] laws. We do not discriminate on the basis of race, color, national origin, age, disability sex, sexual orientation or gender identity.            Thank you!     Thank you for choosing [facility]  for your care. Our goal is always to provide you with excellent care. Hearing back from our patients is one way we can continue to improve our services. Please take a few minutes to complete the written survey that you may receive in the mail after your visit with us. Thank you!             Your Updated Medication List - Protect others around you: Learn how to safely use, store and throw away your medicines at www.disposemymeds.org.          This list is accurate as of: 8/28/17  4:21 PM.  Always use your most recent med list.                   Brand Name Dispense Instructions for use Diagnosis    amLODIPine 10 MG tablet    NORVASC    90 tablet    Take 1 tablet (10 mg) by mouth daily    Essential hypertension       FLUoxetine 40 MG capsule    PROzac     TK 1 C PO D        metFORMIN 500 MG 24 hr tablet    GLUCOPHAGE-XR    60 tablet    Take 1 tablet (500 mg) by mouth 2 times daily (with meals)    IFG (impaired fasting glucose)        metroNIDAZOLE 500 MG tablet    FLAGYL    14 tablet    Take 1 tablet (500 mg) by mouth 2 times daily    BV (bacterial vaginosis)       norgestim-eth estrad triphasic 0.18/0.215/0.25 MG-25 MCG per tablet    ORTHO TRI-CYCLEN LO    84 tablet    Take 1 tablet by mouth daily    Encounter for surveillance of contraceptive pills       propranolol 80 MG 24 hr capsule    INDERAL LA    30 capsule    Take 1 capsule (80 mg) by mouth daily    Essential hypertension       zolpidem 5 MG tablet    AMBIEN    1 tablet    Take tablet by mouth 15 minutes prior to sleep, for Sleep Study    Excessive daytime sleepiness, Essential hypertension, Morbid obesity due to excess calories (H), Snoring

## 2017-08-28 NOTE — NURSING NOTE
"Chief Complaint   Patient presents with     Recheck Medication     Hypertension       Initial /78 (BP Location: Right arm, Patient Position: Sitting, Cuff Size: Adult Large)  Pulse 80  Temp 98.8  F (37.1  C) (Oral)  Resp 16  Wt (!) 380 lb (172.4 kg)  LMP 07/08/2017 (Approximate)  BMI 49.46 kg/m2 Estimated body mass index is 49.46 kg/(m^2) as calculated from the following:    Height as of 8/15/17: 6' 1.5\" (1.867 m).    Weight as of this encounter: 380 lb (172.4 kg).  Medication Reconciliation: complete   Olivia Kelly MA      "

## 2017-08-28 NOTE — PROGRESS NOTES
SUBJECTIVE:   Zenobia Cantu is a 27 year old female who presents to clinic today for the following health issues:      Hypertension Follow-up      Outpatient blood pressures are being checked at clinic.  Results are see chart.    Low Salt Diet: no added salt      BP Readings from Last 6 Encounters:   08/28/17 162/78   08/15/17 (!) 157/99   08/09/17 138/80   08/04/17 180/86   08/02/17 (!) 168/101   07/31/17 184/86       Zenobia is here to follow up on recent diagnosis of HTN.  She started on Norvasc about a month ago.  She has been feeling well on the medication overall but her blood pressure has continued to be a bit labile.  She has also been seeing Darya Fuentes in consult for obesity.  She just also started Metformin a few weeks ago.  Today she would also like to mention a new symptom of an increase in night time urination.  She has been up a couple times each night lately in the last two weeks.  Once around  11/12pm and again at 3/4am.  No pain with this.  No back or abdominal pain.  No vaginal symptoms noted.  She is a little concerned as she is having a sleep study done on Sept 22nd.      Problem list and histories reviewed & adjusted, as indicated.  Additional history: as documented    Wt Readings from Last 4 Encounters:   08/28/17 (!) 380 lb (172.4 kg)   08/15/17 (!) 380 lb (172.4 kg)   08/02/17 (!) 383 lb 14.4 oz (174.1 kg)   07/31/17 (!) 382 lb 9.6 oz (173.5 kg)         Reviewed and updated as needed this visit by clinical staff  Tobacco  Allergies  Problems  Med Hx  Surg Hx  Fam Hx  Soc Hx      Reviewed and updated as needed this visit by Provider         ROS:  Constitutional, HEENT, cardiovascular, pulmonary, gi and gu systems are negative, except as otherwise noted.      OBJECTIVE:   /78 (BP Location: Right arm, Patient Position: Sitting, Cuff Size: Adult Large)  Pulse 80  Temp 98.8  F (37.1  C) (Oral)  Resp 16  Wt (!) 380 lb (172.4 kg)  LMP 07/08/2017 (Approximate)  BMI 49.46  kg/m2  Body mass index is 49.46 kg/(m^2).  GENERAL: healthy, alert and no distress  NECK: no adenopathy, no asymmetry, masses, or scars and thyroid normal to palpation  CV: regular rate and rhythm, normal S1 S2, no S3 or S4, no murmur, click or rub, no peripheral edema and peripheral pulses strong  ABDOMEN: soft, nontender, no hepatosplenomegaly, no masses and bowel sounds normal  MS: no gross musculoskeletal defects noted, no edema  SKIN: no suspicious lesions or rashes  PSYCH: mentation appears normal, affect normal/bright    Diagnostic Test Results:  Results for orders placed or performed in visit on 08/28/17   *UA reflex to Microscopic and Culture ([facility] (except [place] and [place])   Result Value Ref Range    Color Urine Yellow     Appearance Urine Clear     Glucose Urine Negative NEG^Negative mg/dL    Bilirubin Urine Negative NEG^Negative    Ketones Urine 15 (A) NEG^Negative mg/dL    Specific Gravity Urine >1.030 1.003 - 1.035    Blood Urine Negative NEG^Negative    pH Urine 5.5 5.0 - 7.0 pH    Protein Albumin Urine Negative NEG^Negative mg/dL    Urobilinogen Urine 0.2 0.2 - 1.0 EU/dL    Nitrite Urine Negative NEG^Negative    Leukocyte Esterase Urine Negative NEG^Negative    Source Midstream Urine    Wet prep   Result Value Ref Range    Specimen Description Vagina     Wet Prep No Trichomonas seen     Wet Prep Clue cells seen (A)     Wet Prep No yeast seen        ASSESSMENT/PLAN:   1. Essential hypertension  Will start on additional agent for BP control as it is not controlled yet.  Follow up 1-2 weeks for nurse visit.  Follow up one month for med check.  - propranolol (INDERAL LA) 80 MG 24 hr capsule; Take 1 capsule (80 mg) by mouth daily  Dispense: 30 capsule; Refill: 1  - amLODIPine (NORVASC) 10 MG tablet; Take 1 tablet (10 mg) by mouth daily  Dispense: 90 tablet; Refill: 1    2. Urinary frequency    - *UA reflex to Microscopic and Culture ([facility] (except [place] and [place])  - Wet prep    3. BV (bacterial vaginosis)  Treat for BV, could be the cause of her symptoms, if this does not improve the urinary problem she should follow up with us in clinic.  - metroNIDAZOLE (FLAGYL) 500 MG tablet; Take 1 tablet (500 mg) by mouth 2 times daily  Dispense: 14 tablet; Refill: 0        Joe Ramirez PA-C  [facility]

## 2017-08-29 ENCOUNTER — MYC MEDICAL ADVICE (OUTPATIENT)
Dept: FAMILY MEDICINE | Facility: CLINIC | Age: 27
End: 2017-08-29

## 2017-09-01 ENCOUNTER — MYC MEDICAL ADVICE (OUTPATIENT)
Dept: ENDOCRINOLOGY | Facility: CLINIC | Age: 27
End: 2017-09-01

## 2017-09-05 ENCOUNTER — ALLIED HEALTH/NURSE VISIT (OUTPATIENT)
Dept: NURSING | Facility: CLINIC | Age: 27
End: 2017-09-05
Payer: COMMERCIAL

## 2017-09-05 VITALS — HEART RATE: 88 BPM | SYSTOLIC BLOOD PRESSURE: 162 MMHG | DIASTOLIC BLOOD PRESSURE: 90 MMHG

## 2017-09-05 DIAGNOSIS — I10 ESSENTIAL HYPERTENSION: Primary | ICD-10-CM

## 2017-09-05 PROCEDURE — 99207 ZZC NO CHARGE NURSE ONLY: CPT

## 2017-09-05 NOTE — PROGRESS NOTES
Zenobia Cantu is a 27 year old female who comes in today for a Blood Pressure check because of ongoing blood pressure monitoring.    *Document pulse and BP  *Use new set of vitals button for multiple readings.  *Use extended vitals for orthostatic    Vitals as recorded, a large cuff was used.    Patient is taking medication as prescribed  Patient is tolerating medications well.  Patient is not monitoring Blood Pressure at home.  Average readings if yes are NA    Current complaints: none    Disposition: follow-up as indicated by MD/AP. Make follow up appointment to discuss other medication options if bp continues to be high.

## 2017-09-05 NOTE — MR AVS SNAPSHOT
After Visit Summary   9/5/2017    Zenobia Cantu    MRN: [id number]           Patient Information     Date Of Birth          1990        Visit Information        Provider Department      9/5/2017 4:00 PM FM NURSE [facility]        Today's Diagnoses     Essential hypertension    -  1       Follow-ups after your visit        Your next 10 appointments already scheduled     Sep 05, 2017  4:00 PM CDT   Nurse Only with  NURSE   [facility]    [address]  [facility] [address] [phone]            Sep 11, 2017  3:00 PM CDT   Nurse Only with  NURSE   [facility]    [address]  [facility] [address] [phone]            Sep 22, 2017  8:30 PM CDT   Psg Split W/Tcm with BED 5 SH SLEEP   [facility] [place] [facility]    [address]  [facility] [address] [phone]            Sep 27, 2017 10:30 AM CDT   New Visit with FARAZ [place]   [facility]    [address] [address] [phone]            Sep 29, 2017  4:30 PM CDT   Return Sleep Patient with Kenney Hector MD   [facility]    [address]  [facility] [id number] [phone]            Oct 02, 2017  3:20 PM CDT   SHORT with Joe Ramirez PA-C   [facility]    [address]  [facility] [address] [phone]              Who to contact     If you have questions or need follow up information about today's clinic visit or your schedule please contact [facility] directly at [phone].  Normal or non-critical lab and imaging results will be communicated to you by  MyChart, letter or phone within 4 business days after the clinic has received the results. If you do not hear from us within 7 days, please contact the clinic through [employer] or phone. If you have a critical or abnormal lab result, we will notify you by phone as soon as possible.  Submit refill requests through [employer] or call your pharmacy and they will forward the refill request to us. Please allow 3 business days for your refill to be completed.          Additional Information About Your Visit        [employer] Information     [employer] gives you secure access to your electronic health record. If you see a primary care provider, you can also send messages to your care team and make appointments. If you have questions, please call your primary care clinic.  If you do not have a primary care provider, please call [phone] and they will assist you.        Care EveryWhere ID     This is your Care EveryWhere ID. This could be used by other organizations to access your [place] medical records  [id number]        Your Vitals Were     Pulse                   88            Blood Pressure from Last 3 Encounters:   09/05/17 162/90   08/28/17 162/78   08/15/17 (!) 157/99    Weight from Last 3 Encounters:   08/28/17 (!) 380 lb (172.4 kg)   08/15/17 (!) 380 lb (172.4 kg)   08/02/17 (!) 383 lb 14.4 oz (174.1 kg)              Today, you had the following     No orders found for display       Primary Care Provider Office Phone # Fax #    Joe Katie Ramirez PA-C [phone]       [address] [address]        Equal Access to Services     [facility]: Hadii aad ku hadasho Soomaali, waaxda luqadaha, qaybta kaalmada adeegyarajiv, ronel kerr . So [facility] [phone].    ATENCIÓN: Si habla español, tiene a prescott disposición servicios gratuitos de asistencia lingüística. Llame al [phone].    We comply with applicable federal civil rights laws and [place] laws. We do not  discriminate on the basis of race, color, national origin, age, disability sex, sexual orientation or gender identity.            Thank you!     Thank you for choosing [facility]  for your care. Our goal is always to provide you with excellent care. Hearing back from our patients is one way we can continue to improve our services. Please take a few minutes to complete the written survey that you may receive in the mail after your visit with us. Thank you!             Your Updated Medication List - Protect others around you: Learn how to safely use, store and throw away your medicines at www.disposemymeds.org.          This list is accurate as of: 9/5/17  3:22 PM.  Always use your most recent med list.                   Brand Name Dispense Instructions for use Diagnosis    amLODIPine 10 MG tablet    NORVASC    90 tablet    Take 1 tablet (10 mg) by mouth daily    Essential hypertension       FLUoxetine 40 MG capsule    PROzac     TK 1 C PO D        metFORMIN 500 MG 24 hr tablet    GLUCOPHAGE-XR    60 tablet    Take 1 tablet (500 mg) by mouth 2 times daily (with meals)    IFG (impaired fasting glucose)       metroNIDAZOLE 500 MG tablet    FLAGYL    14 tablet    Take 1 tablet (500 mg) by mouth 2 times daily    BV (bacterial vaginosis)       norgestim-eth estrad triphasic 0.18/0.215/0.25 MG-25 MCG per tablet    ORTHO TRI-CYCLEN LO    84 tablet    Take 1 tablet by mouth daily    Encounter for surveillance of contraceptive pills       propranolol 80 MG 24 hr capsule    INDERAL LA    30 capsule    Take 1 capsule (80 mg) by mouth daily    Essential hypertension       zolpidem 5 MG tablet    AMBIEN    1 tablet    Take tablet by mouth 15 minutes prior to sleep, for Sleep Study    Excessive daytime sleepiness, Essential hypertension, Morbid obesity due to excess calories (H), Snoring

## 2017-09-13 ENCOUNTER — ALLIED HEALTH/NURSE VISIT (OUTPATIENT)
Dept: NURSING | Facility: CLINIC | Age: 27
End: 2017-09-13
Payer: COMMERCIAL

## 2017-09-13 VITALS — DIASTOLIC BLOOD PRESSURE: 78 MMHG | HEART RATE: 72 BPM | SYSTOLIC BLOOD PRESSURE: 144 MMHG

## 2017-09-13 DIAGNOSIS — I10 ESSENTIAL HYPERTENSION: Primary | ICD-10-CM

## 2017-09-13 PROCEDURE — 99207 ZZC NO CHARGE NURSE ONLY: CPT

## 2017-09-13 NOTE — MR AVS SNAPSHOT
After Visit Summary   9/13/2017    Zenobia Cantu    MRN: [id number]           Patient Information     Date Of Birth          1990        Visit Information        Provider Department      9/13/2017 4:00 PM FM NURSE [facility]        Today's Diagnoses     Essential hypertension    -  1       Follow-ups after your visit        Your next 10 appointments already scheduled     Sep 22, 2017  8:30 PM CDT   Psg Split W/Tcm with BED 5 SH SLEEP   [facility]    [address]  [facility] [address] [phone]            Sep 25, 2017  4:00 PM CDT   Nurse Only with FM NURSE   [facility]    [address] [place] [address], [address]  [facility] [address] [phone]            Sep 27, 2017 10:30 AM CDT   New Visit with FARAZ [place]   [facility]    [address] [address] [phone]            Sep 29, 2017  4:30 PM CDT   Return Sleep Patient with Kenney Hector MD   [facility]    [address]  [facility] [address] [phone]            Oct 02, 2017  3:20 PM CDT   SHORT with Joe Ramirez PA-C   [facility]    [address]  [facility] [address] [phone]              Who to contact     If you have questions or need follow up information about today's clinic visit or your schedule please contact [facility] directly at [phone].  Normal or non-critical lab and imaging results will be communicated to you by MyChart, letter or phone within 4 business days after the clinic has received the results. If you do not hear from us within 7 days, please contact the clinic through MyChart or phone. If you have a  critical or abnormal lab result, we will notify you by phone as soon as possible.  Submit refill requests through [employer] or call your pharmacy and they will forward the refill request to us. Please allow 3 business days for your refill to be completed.          Additional Information About Your Visit        [employer] Information     [employer] gives you secure access to your electronic health record. If you see a primary care provider, you can also send messages to your care team and make appointments. If you have questions, please call your primary care clinic.  If you do not have a primary care provider, please call [phone] and they will assist you.        Care EveryWhere ID     This is your Care EveryWhere ID. This could be used by other organizations to access your [place] medical records  [id number]        Your Vitals Were     Pulse Breastfeeding?                72 No           Blood Pressure from Last 3 Encounters:   09/13/17 144/78   09/05/17 162/90   08/28/17 162/78    Weight from Last 3 Encounters:   08/28/17 (!) 380 lb (172.4 kg)   08/15/17 (!) 380 lb (172.4 kg)   08/02/17 (!) 383 lb 14.4 oz (174.1 kg)              Today, you had the following     No orders found for display       Primary Care Provider Office Phone # Fax #    Joe Ramirez PA-C [phone]       [address] [facility] [address]        Equal Access to Services     [facility] : Hadii aad ku hadasho Soomaali, waaxda luqadaha, qaybta kaalmada adeegyada, waxay lilibeth hayazalea kerr . So [facility] [phone].    ATENCIÓN: Si habla español, tiene a prescott disposición servicios gratuitos de asistencia lingüística. Llame al [phone].    We comply with applicable federal civil rights laws and [place] laws. We do not discriminate on the basis of race, color, national origin, age, disability sex, sexual orientation or gender identity.            Thank you!     Thank you for choosing [facility]  for your care. Our goal is always to provide you with excellent care. Hearing back from our patients is one way we can continue to improve our services. Please take a few minutes to complete the written survey that you may receive in the mail after your visit with us. Thank you!             Your Updated Medication List - Protect others around you: Learn how to safely use, store and throw away your medicines at www.disposemymeds.org.          This list is accurate as of: 9/13/17  4:14 PM.  Always use your most recent med list.                   Brand Name Dispense Instructions for use Diagnosis    amLODIPine 10 MG tablet    NORVASC    90 tablet    Take 1 tablet (10 mg) by mouth daily    Essential hypertension       FLUoxetine 40 MG capsule    PROzac     TK 1 C PO D        metFORMIN 500 MG 24 hr tablet    GLUCOPHAGE-XR    60 tablet    Take 1 tablet (500 mg) by mouth 2 times daily (with meals)    IFG (impaired fasting glucose)       norgestim-eth estrad triphasic 0.18/0.215/0.25 MG-25 MCG per tablet    ORTHO TRI-CYCLEN LO    84 tablet    Take 1 tablet by mouth daily    Encounter for surveillance of contraceptive pills       propranolol 80 MG 24 hr capsule    INDERAL LA    30 capsule    Take 1 capsule (80 mg) by mouth daily    Essential hypertension

## 2017-09-13 NOTE — NURSING NOTE
"Chief Complaint   Patient presents with     Allied Health Visit     BP check       Initial /78  Pulse 72  Breastfeeding? No Estimated body mass index is 49.46 kg/(m^2) as calculated from the following:    Height as of 8/15/17: 6' 1.5\" (1.867 m).    Weight as of 8/28/17: 380 lb (172.4 kg).  Medication Reconciliation: terrell Cantu is a 27 year old female who comes in today for a Blood Pressure check because of ongoing blood pressure monitoring.    *Document pulse and BP  *Use new set of vitals button for multiple readings.  *Use extended vitals for orthostatic    Vitals as recorded, a Large Long cuff was used.    Patient is taking medication as prescribed  Patient is tolerating medications well.  Patient is not monitoring Blood Pressure at home.    Current complaints: none    Disposition: patient instructed to have one more nurse only visit before follow up appointment on 10/4/17.      Sangeeta Lugo CMA ([facility])      "

## 2017-09-22 ENCOUNTER — THERAPY VISIT (OUTPATIENT)
Dept: SLEEP MEDICINE | Facility: CLINIC | Age: 27
End: 2017-09-22
Payer: COMMERCIAL

## 2017-09-22 DIAGNOSIS — I10 ESSENTIAL HYPERTENSION: ICD-10-CM

## 2017-09-22 DIAGNOSIS — G47.19 EXCESSIVE DAYTIME SLEEPINESS: ICD-10-CM

## 2017-09-22 DIAGNOSIS — E66.01 MORBID OBESITY DUE TO EXCESS CALORIES (H): ICD-10-CM

## 2017-09-22 DIAGNOSIS — R06.83 SNORING: ICD-10-CM

## 2017-09-22 PROCEDURE — 82805 BLOOD GASES W/O2 SATURATION: CPT | Performed by: FAMILY MEDICINE

## 2017-09-22 PROCEDURE — 95810 POLYSOM 6/> YRS 4/> PARAM: CPT | Performed by: FAMILY MEDICINE

## 2017-09-22 PROCEDURE — 36415 COLL VENOUS BLD VENIPUNCTURE: CPT | Performed by: FAMILY MEDICINE

## 2017-09-22 NOTE — MR AVS SNAPSHOT
After Visit Summary   9/22/2017    Zenobia Cantu    MRN: [id number]           Patient Information     Date Of Birth          1990        Visit Information        Provider Department      9/22/2017 8:30 PM BED 5 SH SLEEP [facility]        Today's Diagnoses     Excessive daytime sleepiness        Essential hypertension        Morbid obesity due to excess calories (H)        Snoring          Care Instructions    Diagnostic PSG completed per provider order.  Patient did not meet criteria for PAP therapy.          Follow-ups after your visit        Your next 10 appointments already scheduled     Sep 26, 2017  4:00 PM CDT   Nurse Only with FM NURSE   [facility]    [address]  [facility] [address]   [phone]            Sep 29, 2017  4:30 PM CDT   Return Sleep Patient with Kenney Hector MD   [facility]    [address]  [facility] [address]   [phone]            Oct 02, 2017  3:20 PM CDT   SHORT with Joe Ramirez PA-C   [facility]    [address]  [facility] [address]   [phone]              Who to contact     If you have questions or need follow up information about today's clinic visit or your schedule please contact [facility] directly at [phone].  Normal or non-critical lab and imaging results will be communicated to you by MyChart, letter or phone within 4 business days after the clinic has received the results. If you do not hear from us within 7 days, please contact the clinic through MyChart or phone. If you have a critical or abnormal lab result, we will notify you by phone as soon as possible.  Submit refill requests through [employer] or call your pharmacy and they will forward the refill request to us. Please  allow 3 business days for your refill to be completed.          Additional Information About Your Visit        MyChart Information     [employer] gives you secure access to your electronic health record. If you see a primary care provider, you can also send messages to your care team and make appointments. If you have questions, please call your primary care clinic.  If you do not have a primary care provider, please call [phone] and they will assist you.        Care EveryWhere ID     This is your Care EveryWhere ID. This could be used by other organizations to access your [place] medical records  [id number]         Blood Pressure from Last 3 Encounters:   09/13/17 144/78   09/05/17 162/90   08/28/17 162/78    Weight from Last 3 Encounters:   08/28/17 (!) 172.4 kg (380 lb)   08/15/17 (!) 172.4 kg (380 lb)   08/02/17 (!) 174.1 kg (383 lb 14.4 oz)              We Performed the Following     ABG-Blood Gas Arterial ([place] / [place])     Comprehensive Sleep Study        Primary Care Provider Office Phone # Fax #    Joe Ramirez PA-C [phone]       [address]  [facility] [address]        Equal Access to Services     DAVY RAMIREZ : Hadii aad ku hadasho Soomaali, waaxda luqadaha, qaybta kaalmada adeegyada, waxay idiin hayamien jaden lemos. So [facility] [phone].    ATENCIÓN: Si habla español, tiene a prescott disposición servicios gratuitos de asistencia lingüística. Llame al [phone].    We comply with applicable federal civil rights laws and [place] laws. We do not discriminate on the basis of race, color, national origin, age, disability sex, sexual orientation or gender identity.            Thank you!     Thank you for choosing [place] [facility]  for your care. Our goal is always to provide you with excellent care. Hearing back from our patients is one way we can continue to improve our services. Please take a few minutes to complete the written survey  that you may receive in the mail after your visit with us. Thank you!             Your Updated Medication List - Protect others around you: Learn how to safely use, store and throw away your medicines at www.disposemymeds.org.          This list is accurate as of: 9/22/17 11:59 PM.  Always use your most recent med list.                   Brand Name Dispense Instructions for use Diagnosis    amLODIPine 10 MG tablet    NORVASC    90 tablet    Take 1 tablet (10 mg) by mouth daily    Essential hypertension       FLUoxetine 40 MG capsule    PROzac     TK 1 C PO D        metFORMIN 500 MG 24 hr tablet    GLUCOPHAGE-XR    60 tablet    Take 1 tablet (500 mg) by mouth 2 times daily (with meals)    IFG (impaired fasting glucose)       norgestim-eth estrad triphasic 0.18/0.215/0.25 MG-25 MCG per tablet    ORTHO TRI-CYCLEN LO    84 tablet    Take 1 tablet by mouth daily    Encounter for surveillance of contraceptive pills       propranolol 80 MG 24 hr capsule    INDERAL LA    30 capsule    Take 1 capsule (80 mg) by mouth daily    Essential hypertension

## 2017-09-23 PROCEDURE — 36415 COLL VENOUS BLD VENIPUNCTURE: CPT | Performed by: FAMILY MEDICINE

## 2017-09-23 PROCEDURE — 82805 BLOOD GASES W/O2 SATURATION: CPT | Performed by: FAMILY MEDICINE

## 2017-09-25 ENCOUNTER — MYC MEDICAL ADVICE (OUTPATIENT)
Dept: SLEEP MEDICINE | Facility: CLINIC | Age: 27
End: 2017-09-25

## 2017-09-26 ENCOUNTER — TELEPHONE (OUTPATIENT)
Dept: FAMILY MEDICINE | Facility: CLINIC | Age: 27
End: 2017-09-26

## 2017-09-26 ENCOUNTER — ALLIED HEALTH/NURSE VISIT (OUTPATIENT)
Dept: NURSING | Facility: CLINIC | Age: 27
End: 2017-09-26
Payer: COMMERCIAL

## 2017-09-26 VITALS — SYSTOLIC BLOOD PRESSURE: 148 MMHG | HEART RATE: 76 BPM | DIASTOLIC BLOOD PRESSURE: 88 MMHG

## 2017-09-26 DIAGNOSIS — I10 ESSENTIAL HYPERTENSION: Primary | ICD-10-CM

## 2017-09-26 LAB
BASE DEFICIT BLDA-SCNC: 0.4 MMOL/L
HCO3 BLD-SCNC: 25 MMOL/L (ref 21–28)
OXYHGB MFR BLD: 97 % (ref 92–100)
PCO2 BLD: 43 MM HG (ref 35–45)
PH BLD: 7.37 PH (ref 7.35–7.45)
PO2 BLD: 94 MM HG (ref 80–105)

## 2017-09-26 PROCEDURE — 99207 ZZC NO CHARGE NURSE ONLY: CPT

## 2017-09-26 NOTE — MR AVS SNAPSHOT
After Visit Summary   9/26/2017    Zenobia Cantu    MRN: [id number]           Patient Information     Date Of Birth          1990        Visit Information        Provider Department      9/26/2017 4:00 PM FM NURSE [facility]        Today's Diagnoses     Essential hypertension    -  1       Follow-ups after your visit        Your next 10 appointments already scheduled     Sep 29, 2017  4:30 PM CDT   Return Sleep Patient with Kenney Hector MD   [facility] ([facility])    [address]  [place] [address]   [phone]            Oct 02, 2017  3:20 PM CDT   SHORT with Joe Ramirez PA-C   [facility]    [address]  [place] [address] [phone]              Who to contact     If you have questions or need follow up information about today's clinic visit or your schedule please contact [facility] directly at [phone].  Normal or non-critical lab and imaging results will be communicated to you by MyChart, letter or phone within 4 business days after the clinic has received the results. If you do not hear from us within 7 days, please contact the clinic through [employer] or phone. If you have a critical or abnormal lab result, we will notify you by phone as soon as possible.  Submit refill requests through [facility] or call your pharmacy and they will forward the refill request to us. Please allow 3 business days for your refill to be completed.          Additional Information About Your Visit        MyChart Information     [facility] gives you secure access to your electronic health record. If you see a primary care provider, you can also send messages to your care team and make appointments. If you have questions, please call your primary care clinic.  If you do not have a primary care provider, please call  [phone] and they will assist you.        Care EveryWhere ID     This is your Care EveryWhere ID. This could be used by other organizations to access your [place] medical records  [id number]        Your Vitals Were     Pulse                   76            Blood Pressure from Last 3 Encounters:   09/26/17 148/88   09/13/17 144/78   09/05/17 162/90    Weight from Last 3 Encounters:   08/28/17 (!) 380 lb (172.4 kg)   08/15/17 (!) 380 lb (172.4 kg)   08/02/17 (!) 383 lb 14.4 oz (174.1 kg)              Today, you had the following     No orders found for display       Primary Care Provider Office Phone # Fax #    Joe Ramirez PA-C [phone]  [address] [facility] [address]        Equal Access to Services     JOHN RAMIREZ : Hadii aad ku hadasho Soomaali, waaxda luqadaha, qaybta kaalmada adeegyada, waxay idiin hayaan adeeda kerr . So [facility] [phone].    ATENCIÓN: Si habla español, tiene a prescott disposición servicios gratuitos de asistencia lingüística. Llame al [phone].    We comply with applicable federal civil rights laws and [place] laws. We do not discriminate on the basis of race, color, national origin, age, disability sex, sexual orientation or gender identity.            Thank you!     Thank you for choosing [facility]  for your care. Our goal is always to provide you with excellent care. Hearing back from our patients is one way we can continue to improve our services. Please take a few minutes to complete the written survey that you may receive in the mail after your visit with us. Thank you!             Your Updated Medication List - Protect others around you: Learn how to safely use, store and throw away your medicines at www.disposemymeds.org.          This list is accurate as of: 9/26/17  4:35 PM.  Always use your most recent med list.                   Brand Name Dispense Instructions for use Diagnosis    amLODIPine 10 MG tablet     NORVASC    90 tablet    Take 1 tablet (10 mg) by mouth daily    Essential hypertension       FLUoxetine 40 MG capsule    PROzac     TK 1 C PO D        metFORMIN 500 MG 24 hr tablet    GLUCOPHAGE-XR    60 tablet    Take 1 tablet (500 mg) by mouth 2 times daily (with meals)    IFG (impaired fasting glucose)       norgestim-eth estrad triphasic 0.18/0.215/0.25 MG-25 MCG per tablet    ORTHO TRI-CYCLEN LO    84 tablet    Take 1 tablet by mouth daily    Encounter for surveillance of contraceptive pills       propranolol 80 MG 24 hr capsule    INDERAL LA    30 capsule    Take 1 capsule (80 mg) by mouth daily    Essential hypertension

## 2017-09-26 NOTE — PROCEDURES
" SLEEP STUDY INTERPRETATION  POLYSOMNOGRAPHY REPORT      Patient: Zenobia Cantu  YOB: 1990  Study Date: 9/22/2017  MRN: [id number]  Referring Provider: MADISON Fuentes Linda  Ordering Provider: Kenney Hector MD, MPH    Indications for Polysomnography: The patient is a 27 year old female who is 6' 1\" and weighs 380.0 lbs. Her BMI is 50.4, Bozman sleepiness scale 11.0 and neck size is 44.0. Relevant medical history includes major depressive disorder, anxiety disorder, hypertension, and morbid obesity. A diagnostic polysomnogram (PSG) was performed to evaluate for obstructive sleep apnea (KENY) and/or sleep associated hypoventilation/hypoxemia.    Polysomnogram Data: A full night polysomnogram (PSG) recorded the standard physiologic parameters including EEG, EOG, EMG, ECG, nasal and oral airflow. Respiratory parameters of chest and abdominal movements were recorded with respiratory inductance plethysmography. Oxygen saturation was recorded by pulse oximetry.      Sleep Architecture: Abnormal sleep architecture with very limited REM sleep. Sleep fragmentation and decreased sleep efficiency noted. Limited REM, including REM supine, captured.  The total recording time of the polysomnogram was 457.0 minutes. The total sleep time was 293.5 minutes. Sleep latency was normal at 12.2 minutes with the use of a sleep aid (zolpidem 5 mg). REM latency was 313.5 minutes.  Arousal index was increased at 49.5 arousals per hour. Sleep efficiency was decreased at 64.2%. Wake after sleep onset was 141.0 minutes. The patient spent 23.0% of total sleep time in Stage N1, 52.3% in Stage N2, 22.7% in Stages N3, and 2.0% in REM. Time in REM supine was 6.0 minutes.    Respiration: Moderate KENY without sleep associated hypoxemia or hypoventilation noted. Loud snoring recorded.  Events - The polysomnogram revealed a presence of 5 obstructive, 0 central, and 0 mixed apneas resulting in an apnea index of 1.0 events per hour. There " were 88 hypopneas resulting in a hypopnea index of 18.0 events per hour. The combined apnea/hypopnea index was 19.0 events per hour. The REM AHI was 80.0 events per hour. The supine AHI was 54.2 events per hour. The RERA index was 10.8 events per hour. The RDI was 29.8 events per hour.  Snoring - was reported as loud.  Respiratory rate and pattern - was notable for normal respiratory rate and pattern.  Sustained Sleep Associated Hypoventilation - Transcutaneous carbon dioxide monitoring was used, however significant hypoventilation was not suggested by oximetry, or was not present with a maximum change of less than 10 mmHg. Arterial blood gases showed no abnormalities with pCO2 of 43 mmHg, pO2 94 mmHg, bicarbonate of 25 mmol/L and pG of 7.37.   Sleep Associated Hypoxemia - (Greater than 5 minutes O2 sat below 89%) was not present. Baseline oxygen saturation was 94.3%. Lowest oxygen saturation was 83.1%. Time spent less than or equal to 88% was 2.9 minutes. Time spent less than or equal to 89% was 7.0 minutes.    Movement Activity: No abnormal periodic limb movements, REM EMG activity, nocturnal behaviors, or bruxism noted.  Periodic Limb Activity - There were 26 PLMs during the entire study. The PLM index was 5.3 movements per hour. The PLM Arousal Index was 1.0 per hour.  REM EMG Activity - Excessive transient / sustained muscle activity was present.  Nocturnal Behavior - Abnormal sleep related behaviors were not noted during / arising out of NREM / REM sleep.  Bruxism - None apparent.    Cardiac Summary: Normal and regular sinus rhythm.  The average pulse rate was 69.6 bpm.  The minimum pulse rate was 57.0 bpm while the maximum pulse rate was 100.0 bpm. The rhythm is normal sinus. Arrhythmias were not noted.    Assessment:   The PSG sleep study demonstrated moderate KENY without any sleep associated hypoxemia or hypoventilation noted. Loud snoring was recorded throughout the study. Of note, the limited REM and  supine sleep may be underestimating the true severity of the condition.  The study showed abnormal sleep architecture with very limited REM sleep. Sleep fragmentation and decreased sleep efficiency was noted. As mentioned, limited REM, including REM supine, was captured.  There were no abnormal periodic limb movements, REM EMG activity, nocturnal behaviors, or bruxism noted throughout the study.  Lastly, normal and regular sinus rhythm was noted during the PSG sleep study.    Recommendations:  Based on the presence of moderate obstructive sleep apnea and excessive daytime sleepiness, treatment could be empirically initiated with an auto-adjusting PAP therapy with a range of 5 - 15 cmH2O. Recommend clinical follow up with sleep management team.  Patient may be a candidate for dental appliance through referral to Sleep Dentistry for the treatment of moderate obstructive sleep apnea and/or socially disruptive snoring.  If devices are not acceptable or effective, patient may benefit from evaluation of possible surgical options.  If she is interested, would recommend referral to specialized ENT-Sleep provider.  Advise regarding the risks of drowsy driving.  Suggest optimizing sleep schedule and avoiding sleep deprivation.  Weight management (if BMI > 30).    Cardiac Comments: Normal Sinus Rhythm  Diagnostic Codes:   Obstructive Sleep Apnea G47.33  Repetitive Intrusions Into Sleep F51.8  Unspecified Sleep Disturbance G47.9       Diagnostic Study  Sleep Study 9/22/2017 - (380.0 lbs) AHI 19.0, RDI 29.8, Supine AHI 54.2, REM AHI 80.0, Low O2 83.1%, Time Spent =88% 2.9 minutes / Time Spent =89% 7.0 minutes.      _____________________________________   Kenney Hector MD, MPH 09/26/2017             Range(%) Time in range (min) Time in range (%) Time in or below range (min) Time in or below range (%)   0.0 - 89.0 7.0 1.5% 7.0 1.5%   0.0 - 88.0 2.9 0.6% 2.9 0.6%      19.0 54.2 80.0

## 2017-09-26 NOTE — PROGRESS NOTES
Zenobia Cantu is a 27 year old female who comes in today for a Blood Pressure check because of medication change.    *Document pulse and BP  *Use new set of vitals button for multiple readings.  *Use extended vitals for orthostatic    Vitals as recorded, a x-large cuff was used.    Patient is taking medication as prescribed  Patient is tolerating medications well.  Patient is not monitoring Blood Pressure at home.  Average readings if yes are N/A    Current complaints: none    Disposition: results routed to MD/AP

## 2017-09-26 NOTE — NURSING NOTE
"Chief Complaint   Patient presents with     Allied Health Visit     B/P      Initial /90  Pulse 76 Estimated body mass index is 49.46 kg/(m^2) as calculated from the following:    Height as of 8/15/17: 6' 1.5\" (1.867 m).    Weight as of 8/28/17: 380 lb (172.4 kg).  BP completed using cuff size X-large RIGHT arm.    Lisa Magill, CMA    "

## 2017-09-26 NOTE — NURSING NOTE
"Chief Complaint   Patient presents with     Allied Health Visit     B/P      Initial /88 (BP Location: Right arm, Patient Position: Chair, Cuff Size: Adult Large)  Pulse 76 Estimated body mass index is 49.46 kg/(m^2) as calculated from the following:    Height as of 8/15/17: 6' 1.5\" (1.867 m).    Weight as of 8/28/17: 380 lb (172.4 kg).  BP completed using cuff size X-large right arm.    Lisa Magill, CMA    "

## 2017-09-26 NOTE — TELEPHONE ENCOUNTER
Patient came in today for a NURSE ONLY B/P check:   Vital Signs 9/26/2017 9/26/2017   Systolic 158 148   Diastolic 90 88   Pulse 76    Temperature     Respirations     Weight (LB)     Height     BMI (Calculated)     O2       Patient is taking propranolol (INDERAL LA) 80 MG 24 hr capsule AND  amLODIPine (NORVASC) 10 MG tablet.    Patient has a upcoming appointment scheduled on 10/02/17.     Lisa Magill, CMA

## 2017-09-29 ENCOUNTER — OFFICE VISIT (OUTPATIENT)
Dept: SLEEP MEDICINE | Facility: CLINIC | Age: 27
End: 2017-09-29
Payer: COMMERCIAL

## 2017-09-29 VITALS
BODY MASS INDEX: 39.68 KG/M2 | DIASTOLIC BLOOD PRESSURE: 88 MMHG | OXYGEN SATURATION: 97 % | SYSTOLIC BLOOD PRESSURE: 154 MMHG | HEIGHT: 72 IN | RESPIRATION RATE: 18 BRPM | WEIGHT: 293 LBS | HEART RATE: 75 BPM

## 2017-09-29 DIAGNOSIS — G47.33 OSA (OBSTRUCTIVE SLEEP APNEA): Primary | ICD-10-CM

## 2017-09-29 DIAGNOSIS — I10 ESSENTIAL HYPERTENSION: ICD-10-CM

## 2017-09-29 DIAGNOSIS — R06.83 SNORING: ICD-10-CM

## 2017-09-29 DIAGNOSIS — G47.19 EXCESSIVE DAYTIME SLEEPINESS: ICD-10-CM

## 2017-09-29 DIAGNOSIS — E66.01 MORBID OBESITY DUE TO EXCESS CALORIES (H): ICD-10-CM

## 2017-09-29 PROCEDURE — 99215 OFFICE O/P EST HI 40 MIN: CPT | Performed by: FAMILY MEDICINE

## 2017-09-29 NOTE — NURSING NOTE
"Chief Complaint   Patient presents with     RECHECK     F/u Psg split with TCM,ABG       Initial Pulse 75  Resp 18  Ht 1.867 m (6' 1.5\")  Wt (!) 172.4 kg (380 lb)  SpO2 97%  BMI 49.46 kg/m2 Estimated body mass index is 49.46 kg/(m^2) as calculated from the following:    Height as of this encounter: 1.867 m (6' 1.5\").    Weight as of this encounter: 172.4 kg (380 lb).  Medication Reconciliation: incomplete             Elsy Leggett LPN/SAMANTHA  "

## 2017-09-29 NOTE — PROGRESS NOTES
[facility]  Outpatient Sleep Medicine Follow Up Visit  September 29, 2017     Name: Zenobia Cantu MRN# [id number]   Age: 27 year old YOB: 1990      Date of Follow Up Visit: September 29, 2017  Consultation is requested by: LENORA Javier CNP  [id number] [id number]  Primary care provider: Joe Ramirez     Patient is accompanied by: Patient presents with Ish today        Reason for Sleep Consult:      Zenobia Cantu is a 27 year old female patient that presents here for a follow up evaluation after completing a PSG sleep study.          Assessment and Plan:      Summary Sleep Diagnoses:     (1) Moderate KENY  (2) Snoring  (3) Morbid Obesity  (4) HTN  (5) EDS     Summary Recommendations:     This patient had sxs, hx, and physical findings that suggested the diagnosis of a sleep disordered breathing. In addition, she had a high pre-test probability of KENY. Given this patient's body habitus, she underwent an in-lab split-night PSG sleep study with TCM. Although the patient reported some morning cephalgia, these headache episodes are not consistent with obesity hypoventilation syndrome. The PSG sleep study demonstrated moderate KENY with no sleep associated hypoxemia / hypoventilation (overall AHI was 19.0 events per hour). Today, the study results were explained and discussed. The nature and pathophysiology of KENY were discussed once again. The different treatment options for KENY were also reviewed and explained today again. After much discussion, the patient opted to start PAP therapy. As such, she will be placed on an APAP with minimum pressure of 6 cmH2O and maximum pressure of 16 cmH2O. PAP compliance was discussed and explained. Lifestyle recommendations including healthy dietary and exercising habits were discussed (the patient is already actively trying to lose weight). Pt will follow up with me after 6 weeks of PAP therapy. Lastly, this patient will  follow up with her PCP regarding the elevated BP noted today (education and counseling given).     Coding:  (G47.33) KENY (obstructive sleep apnea)  (primary encounter diagnosis)  (E66.01) Morbid obesity due to excess calories (H)  (I10) Essential hypertension  (R06.83) Snoring  (G47.19) Excessive daytime sleepiness     Counseling included a comprehensive review of diagnostic and therapeutic strategies as well as risks of inadequate therapy.     Educational materials provided in instructions. The patient was instructed to avoid driving or operating any heavy machinery when experiencing drowsiness.     All questions and concerns were addressed today. Pt agrees and understands the assessment and plan.          History of Present Illness:      Zenobia Cantu is a 27 year old  RHD female pt with PMH of MDD, RUBEN, HTN, and morbid obesity presents for a follow up evaluation today after completing a PSG sleep study due to snoring. During the initial visit, the patient explained that she was recently at the endocrinologist due to difficult trying to lose weight.     During the initial visit, the patient reported loud snoring (pt can be heard in a different room), coughing, and choking / gasping for air throughout the night. Pt also endorsed non-retorative sleep and sleep inertia. Pt admitted having some EDS, but pt denied any inadvertent naps. Pt reported morning cephalgia that lasts the entire day. The headache is in the frontal area and this is sharp. No drowsiness when driving with no near accidents. No witnessed apneas reported. Pt denied any xerostomia, nocturia, CP, SOB, morning myalgia, peripheral edema, or any other sxs or concerns with negative ROS.    The patient completed an in-lab PSG sleep study on 09/22/2017 that demonstrated moderate KENY without sleep associated hypoxemia / hypoventilation (overall AHI was 19.0 events per hour). No other abnormalities were noted during the PSG sleep study with normal ECG  and EMG activity.    Pt denies any new sxs or concerns today.     PREVIOUS IN- LAB or HOME SLEEP STUDIES: None Reported     SLEEP-WAKE SCHEDULE:      Zenobia Cantu                           -Describes herself as a morning person; prefers to go to sleep at 10:30 PM and wakes up at 8:00 AM.                           -Pt does not take any naps during the week; takes no inadvertent naps.                           -ON WEEKDAYS, goes to sleep at 9:00 PM during the week; awakens 4:30 AM with an alarm; falls asleep in 10 minutes; denies difficulty falling asleep.                           -ON WEEKENDS, goes to sleep at 10:00 PM and wakes up at 8:00 AM with an alarm; falls asleep in 10 minutes.                             -Awakens 1-2 times a night for 10 minutes before falling back to sleep; awakens to go to the bathroom.       BEDTIME ACTIVITIES AND SHIFT WORK:     Zenobia Cantu                          -Bedtime Activities and Other Sleeping Information: Lives with . Sleeps with  on queen size bed. Pt sleeps with cat. Pt sleeps in all positions. Pt uses computer / phone on the bed. Otherwise, no other electronics used in bed.                          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                                            -Working Hours: 630 AM - 230 PM      SCALES                       SLEEP APNEA: [employer] score: 5 / 8                       SLEEPINESS: [place] sleepiness scale (ESS):  11 / 24 (initial score)                                            Drowsy driving / near accidents: Denies any near accidents                                            Consequences: EDS and non-restorative sleep     SLEEP COMPLAINTS:  Cardio-respiratory                         -Snoring: Significant snoring reported                        -Dyspnea: Pt denies having any witnessed apneas or dyspnea                       -Morning headaches or confusion: Some, but not consistent with  hypoventilation headaches                       -Coexisting Lung disease: Denies diagnosed or known lung disease at this time                                                         -Coexisting Heart disease: Denies diagnosed or known cardiovascular disease at this time                                                       -Does patient have a bed partner: Patient sleeps with spouse                       -Has bed partner been sleeping separately because of snoring:  No      RLS Screen:                        -When you try to relax in the evening or sleep at night, do you ever have unpleasant, restless feelings in your legs that can be relieved by walking or movement? None Reported                          -Periodic limb movement: None Reported     Narcolepsy:                    - Denies sudden urges of sleep attacks                  - Denies cataplexy                  - Denies sleep paralysis                   - Denies hallucinations      Sleep Behaviors:                  - Denies leg symptoms/movements                  - Denies motor restlessness                  - Denies night terrors                  - Admits bruxism (uses retainer)                  - Denies automatic behaviors     Other Subjective Complaints:                  - Denies anxiety or rumination                   - Denies pain and discomfort at  night                  - Denies waking up with heart pounding or racing                  - Denies GERD or aspiration      Parasomnia:                        -NREM - Denies recurrent persistent confusional arousal, night eating, sleep walking or sleep terrors.                           -REM  - Denies dream enactment or injuries.      -Driving Accident or Near Accidents: None reported          Medications:     Current Outpatient Prescriptions   Medication     propranolol (INDERAL LA) 80 MG 24 hr capsule     amLODIPine (NORVASC) 10 MG tablet     metFORMIN (GLUCOPHAGE-XR) 500 MG 24 hr tablet     norgestim-eth  estrad triphasic (ORTHO TRI-CYCLEN LO) 0.18/0.215/0.25 MG-25 MCG per tablet     FLUoxetine (PROZAC) 40 MG capsule     No current facility-administered medications for this visit.            Allergies   Allergen Reactions     Amoxicillin Hives           Past Medical History:      Denies needing any 02 supplement at night.      Past Medical History    No past medical history on file.     -HTN          Past Surgical History:    Denies previous upper airway surgery.       Past Surgical History    No past surgical history on file.             Social History:              Social History    Substance Use Topics      Smoking status: Never Smoker      Smokeless tobacco: Never Used      Alcohol use Yes         Comment: rarely       Chemical History:             Tobacco: Never smoked             Uses 1 sodas/day.             Supplements for wakefulness: Patient does not use any supplements to stay awake              EtOH: None Reported  Recreational Drugs: Patient denies using any recreational drugs      Psych Hx:   PHQ2: Negative                       -Little Interest or pleasure in doing things? 0 - not at all                       -Feeling down, depressed, or hopeless? 0 - not at all     Current dangers to self or others: No. Pt denies any SI / HI, hallucinations, or delusions          Family History:       Family History    Family History   Problem Relation Age of Onset     Hypertension Father           Sleep Family Hx:        RLS - None reported                 KENY - None reported  Insomnia - None reported  Parasomnia - None reported          Review of Systems:      A complete 10 point review of systems was negative other than HPI or as commented below:      CONSTITUTIONAL: NEGATIVE for weight gain/loss, fever, chills, sweats or night sweats, drug allergies.  EYES: NEGATIVE for changes in vision, blind spots, double vision.  ENT: NEGATIVE for ear pain, sore throat, sinus pain, post-nasal drip, runny nose, bloody  "nose  CARDIAC: NEGATIVE for fast heartbeats or fluttering in chest, chest pain or pressure, breathlessness when lying flat, swollen legs or swollen feet.  NEUROLOGIC: NEGATIVE headaches, weakness or numbness in the arms or legs.  DERMATOLOGIC: NEGATIVE for rashes, new moles or change in mole(s)  PULMONARY: NEGATIVE SOB at rest, SOB with activity, dry cough, productive cough, coughing up blood, wheezing or whistling when breathing.    GASTROINTESTINAL: NEGATIVE for nausea or vomitting, loose or watery stools, fat or grease in stools, constipation, abdominal pain, bowel movements black in color or blood noted.  GENITOURINARY: NEGATIVE for pain during urination, blood in urine, urinating more frequently than usual, irregular menstrual periods.  MUSCULOSKELETAL: NEGATIVE for muscle pain, bone or joint pain, swollen joints.  ENDOCRINE: NEGATIVE for increased thirst or urination, diabetes.  LYMPHATIC: NEGATIVE for swollen lymph nodes, lumps or bumps in the breasts or nipple discharge.          Physical Examination:   /88  Pulse 75  Resp 18  Ht 1.867 m (6' 1.5\")  Wt (!) 172.4 kg (380 lb)  SpO2 97%  BMI 49.46 kg/m2    VS: Reviewed and elevated BP noted.  General: Alert, oriented, not in distress. Dressed casually; Good eye contact; Comfortably sitting in a chair; in no apparent distress  Lungs: both hemithoraces are symmetrical, normal to palpation, no dullness to percussion, auscultation of lungs revealed normal breath sounds with no expirium prolongation, wheezing, rhonci and crackles.  CVS: Normal S1 and S2 heart sounds with no extra heart sounds. No murmur, rubs, or clicks. Normal peripheral pulses throughout with no obvious peripheral edema.  Psychiatry: Mood and affect are appropriate. Euthymic with affect congruent with full range and intensity. No SI/HI with adequate insight and judgement.           Data: All pertinent previous laboratory data reviewed      No results found for: PH, PHARTERIAL, PO2, " [id number], SAT, PCO2, HCO3, BASEEXCESS, MERLIN, BEB        Lab Results   Component Value Date     TSH 1.90 07/25/2017            Lab Results   Component Value Date      (H) 08/12/2017     GLC 89 07/25/2017            Lab Results   Component Value Date     HGB 13.3 07/25/2017            Lab Results   Component Value Date     BUN 10 07/25/2017     CR 0.79 07/25/2017      Echocardiology: None Available     Chest x-ray: None Available     PFT: None Available     Laboratory Studies:   Component Value Flag Ref Range Units Status Collected Lab   Cortisol Serum 13.4   4 - 22 ug/dL Final 08/12/2017  8:02 AM 51      Component Value Flag Ref Range Units Status Collected Lab   Cholesterol 128   <200 mg/dL Final 08/12/2017  8:01 AM 65   Triglycerides 105   <150 mg/dL Final 08/12/2017  8:01 AM 65   Comment:   Fasting specimen   HDL Cholesterol 42 (L) >49 mg/dL Final 08/12/2017  8:01 AM 65   LDL Cholesterol Calculated 65   <100 mg/dL Final 08/12/2017  8:01 AM 65   Comment:   Desirable:       <100 mg/dl   Non HDL Cholesterol 86   <130 mg/dL Final 08/12/2017  8:01 AM 65      Component Value Flag Ref Range Units Status Collected Lab   Hemoglobin A1C 5.7   4.3 - 6.0 % Final 08/12/2017  8:01 AM 53      Component Value Flag Ref Range Units Status Collected Lab   WBC 5.8   4.0 - 11.0 10e9/L Final 07/25/2017  4:51 PM FM   RBC Count 4.37   3.8 - 5.2 10e12/L Final 07/25/2017  4:51 PM FM   Hemoglobin 13.3   11.7 - 15.7 g/dL Final 07/25/2017  4:51 PM FM   Hematocrit 40.3   35.0 - 47.0 % Final 07/25/2017  4:51 PM FM   MCV 92   78 - 100 fl Final 07/25/2017  4:51 PM FM   MCH 30.4   26.5 - 33.0 pg Final 07/25/2017  4:51 PM FM   MCHC 33.0   31.5 - 36.5 g/dL Final 07/25/2017  4:51 PM FM   RDW 11.5   10.0 - 15.0 % Final 07/25/2017  4:51 PM FM   Platelet Count 301   150 - 450 10e9/L Final 07/25/2017  4:51 PM FM      Component Value Flag Ref Range Units Status Collected Lab   Sodium 139   133 - 144 mmol/L Final 07/25/2017  4:51 PM 65   Potassium  4.4   3.4 - 5.3 mmol/L Final 07/25/2017  4:51 PM 65   Chloride 106   94 - 109 mmol/L Final 07/25/2017  4:51 PM 65   Carbon Dioxide 28   20 - 32 mmol/L Final 07/25/2017  4:51 PM 65   Anion Gap 5   3 - 14 mmol/L Final 07/25/2017  4:51 PM 65   Glucose 89   70 - 99 mg/dL Final 07/25/2017  4:51 PM 65   Urea Nitrogen 10   7 - 30 mg/dL Final 07/25/2017  4:51 PM 65   Creatinine 0.79   0.52 - 1.04 mg/dL Final 07/25/2017  4:51 PM 65   GFR Estimate 88   >60 mL/min/1.7m2 Final 07/25/2017  4:51 PM 65   Comment:   Non  GFR Calc   GFR Estimate If Black     >60 mL/min/1.7m2 Final 07/25/2017  4:51 PM 65   >90    GFR Calc     Calcium 8.7   8.5 - 10.1 mg/dL Final 07/25/2017  4:51 PM        Component Value Flag Ref Range Units Status Collected Lab   TSH 1.90   0.40 - 4.00 mU/L Final 07/25/2017  4:51 PM 65      Kenney Hector MD, MPH  Clinical Sleep Medicine     Total time spent with patient: 40 min. Over >50% of the time was spent for face to face counseling, education, and evaluation.

## 2017-09-29 NOTE — MR AVS SNAPSHOT
After Visit Summary   9/29/2017    Zenobia Cantu    MRN: [id number]           Patient Information     Date Of Birth          1990        Visit Information        Provider Department      9/29/2017 3:30 PM Kenney Hector MD [facility]        Today's Diagnoses     KENY (obstructive sleep apnea)    -  1    Morbid obesity due to excess calories (H)        Essential hypertension          Care Instructions            Your BMI is Body mass index is 49.46 kg/(m^2).  Weight management is a personal decision.  If you are interested in exploring weight loss strategies, the following discussion covers the approaches that may be successful. Body mass index (BMI) is one way to tell whether you are at a healthy weight, overweight, or obese. It measures your weight in relation to your height.  A BMI of 18.5 to 24.9 is in the healthy range. A person with a BMI of 25 to 29.9 is considered overweight, and someone with a BMI of 30 or greater is considered obese. More than two-thirds of American adults are considered overweight or obese.  Being overweight or obese increases the risk for further weight gain. Excess weight may lead to heart disease and diabetes.  Creating and following plans for healthy eating and physical activity may help you improve your health.  Weight control is part of healthy lifestyle and includes exercise, emotional health, and healthy eating habits. Careful eating habits lifelong are the mainstay of weight control. Though there are significant health benefits from weight loss, long-term weight loss with diet alone may be very difficult to achieve- studies show long-term success with dietary management in less than 10% of people. Attaining a healthy weight may be especially difficult to achieve in those with severe obesity. In some cases, medications, devices and surgical management might be considered.  What can you do?  If you are overweight or obese and are  interested in methods for weight loss, you should discuss this with your provider.     Consider reducing daily calorie intake by 500 calories.     Keep a food journal.     Avoiding skipping meals, consider cutting portions instead.    Diet combined with exercise helps maintain muscle while optimizing fat loss. Strength training is particularly important for building and maintaining muscle mass. Exercise helps reduce stress, increase energy, and improves fitness. Increasing exercise without diet control, however, may not burn enough calories to loose weight.       Start walking three days a week 10-20 minutes at a time    Work towards walking thirty minutes five days a week     Eventually, increase the speed of your walking for 1-2 minutes at time    In addition, we recommend that you review healthy lifestyles and methods for weight loss available through the National Institutes of Health patient information sites:  http://win.niddk.nih.gov/publications/index.htm    And look into health and wellness programs that may be available through your health insurance provider, employer, local community center, or john club.    Weight management plan: Patient was referred to their PCP to discuss a diet and exercise plan.     Your blood pressure was checked while you were in clinic today.  Please read the guidelines below about what these numbers mean and what you should do about them.  Your systolic blood pressure is the top number.  This is the pressure when the heart is pumping.  Your diastolic blood pressure is the bottom number.  This is the pressure in between beats.  If your systolic blood pressure is less than 120 and your diastolic blood pressure is less than 80, then your blood pressure is normal. There is nothing more that you need to do about it  If your systolic blood pressure is 120-139 or your diastolic blood pressure is 80-89, your blood pressure may be higher than it should be.  You should have your blood  "pressure re-checked within a year by a primary care provider.  If your systolic blood pressure is 140 or greater or your diastolic blood pressure is 90 or greater, you may have high blood pressure.  High blood pressure is treatable, but if left untreated over time it can put you at risk for heart attack, stroke, or kidney failure.  You should have your blood pressure re-checked by a primary care provider within the next four weeks.      MY TREATMENT INFORMATION FOR SLEEP APNEA -  Zenobia Cantu    DOCTOR: Kenney Hector MD, MPH  SLEEP CENTER : [facility]         If I haven't had a sleep study yet, what can I expect?  A personal story from [employer]  https://www.[employer].com/watch?v=AxPLmlRpnCs      Am I having a home sleep study?  Here is a video in case you get home and want to make sure you have done it correctly  https://www.[employer].com/watch?v=ACQ2U1iEau6&feature=youtu.be      Frequently asked questions:  1. What is Obstructive Sleep Apnea (KENY)? KENY is the most common type of sleep apnea. Apnea literally means, \"without breath.\" It is characterized by repetitive pauses in breathing, despite continued effort to breathe, and is usually associated with a reduction in blood oxygen saturation. Apneas can last 10 to over 60 seconds. It is caused by narrowing or collapse of the upper airway as muscles relax during sleep. Severity of sleep apnea is determined by frequency of breathing events and their effect on your sleep and oxygen levels determined during sleep testing.     2. What are the consequences of KENY? Symptoms include: daytime sleepiness- possibly increasing the risk of falling asleep while driving, unrefreshing/restless sleep, snoring, insomnia, waking frequently to urinate, waking with heartburn or reflux, reduced concentration and memory, and morning headaches. Other health consequences may include development of high blood pressure and other cardiovascular disease in persons who are " susceptible. Untreated KENY  can contribute to heart disease, stroke and diabetes.     3. What are the treatment options? In most situations, sleep apnea is a lifelong disease that must be managed with daily therapy. Medications are not effective for sleep apnea and surgery is generally not performed until other therapies have been tried. Therapy is usually tailored to the individual patient based on many factors including your wishes as well as severity of sleep apnea and severity of obesity. Continuous Positive Airway (CPAP) is the most reliable treatment. An oral device to hold your jaw forward is usually the next most reliable option. Other options include postioning devices (to keep you off your back), weight loss, and surgery including a tongue pacing device. There is more detail about some of these options below.            1. CPAP-  WHAT DOES IT DO AND HOW CAN I LEARN TO WEAR IT?                               BEFORE I START, CAN I WATCH A MOVIE TO GET A PLAN ON HOW TO USE CPAP?  https://www.[employer].com/watch?e=g3W55hi986B      Continuous positive airway pressure, or CPAP, is the most effective treatment for obstructive sleep apnea. It works by blowing room air, through a mask, to hold your throat open. A decision to use CPAP is a major step forward in the pursuit of a healthier life. The successful use of CPAP will help you breathe easier, sleep better and live healthier. You can choose CPAP equipment from any durable medical equipment provider that meets your needs.  Using CPAP can be a positive experience if you keep these encinas points in mind:  1. Commitment  CPAP is not a quick fix for your problem. It involves a long-term commitment to improve your sleep and your health.    2. Communication  Stay in close communication with both your sleep doctor and your CPAP supplier. Ask lots of questions and seek help when you need it.    3. Consistency  Use CPAP all night, every night and for every nap. You will  "receive the maximum health benefits from CPAP when you use it every time that you sleep. This will also make it easier for your body to adjust to the treatment.    4. Correction  The first machine and mask that you try may not be the best ones for you. Work with your sleep doctor and your CPAP supplier to make corrections to your equipment selection. Ask about trying a different type of machine or mask if you have ongoing problems. Make sure that your mask is a good fit and learn to use your equipment properly.    5. Challenge  Tell a family member or close friend to ask you each morning if you used your CPAP the previous night. Have someone to challenge you to give it your best effort.    6. Connection   Your adjustment to CPAP will be easier if you are able to connect with others who use the same treatment. Ask your sleep doctor if there is a support group in your area for people who have sleep apnea, or look for one on the Internet.  7. Comfort   Increase your level of comfort by using a saline spray, decongestant or heated humidifier if CPAP irritates your nose, mouth or throat. Use your unit's \"ramp\" setting to slowly get used to the air pressure level. There may be soft pads you can buy that will fit over your mask straps. Look on www.CPAP.com for accessories that can help make CPAP use more comfortable.  8. Cleaning   Clean your mask, tubing and headgear on a regular basis. Put this time in your schedule so that you don't forget to do it. Check and replace the filters for your CPAP unit and humidifier.    9. Completion   Although you are never finished with CPAP therapy, you should reward yourself by celebrating the completion of your first month of treatment. Expect this first month to be your hardest period of adjustment. It will involve some trial and error as you find the machine, mask and pressure settings that are right for you.    10. Continuation  After your first month of treatment, continue to make " a daily commitment to use your CPAP all night, every night and for every nap.    CPAP-Tips to starting with success:  Begin using your CPAP for short periods of time during the day while you watch TV or read.    Use CPAP every night and for every nap. Using it less often reduces the health benefits and makes it harder for your body to get used to it.    Make small adjustments to your mask, tubing, straps and headgear until you get the right fit. Tightening the mask may actually worsen the leak.  If it leaves significant marks on your face or irritates the bridge of your nose, it may not be the best mask for you.  Speak with the person who supplied the mask and consider trying other masks. Insurances will allow you to try different masks during the first month of starting CPAP.  Insurance also covers a new mask, hose and filter about every 6 months.    Use a saline nasal spray to ease mild nasal congestion. Neti-Pot or saline nasal rinses may also help. Nasal gel sprays can help reduce nasal dryness.  Biotene mouthwash can be helpful to protect your teeth if you experience frequent dry mouth.  Dry mouth may be a sign of air escaping out of your mouth or out of the mask in the case of a full face mask.  Speak with your provider if you expect that is the case.     Take a nasal decongestant to relieve more severe nasal or sinus congestion.  Do not use Afrin (oxymetazoline) nasal spray more than 3 days in a row.  Speak with your sleep doctor if your nasal congestion is chronic.    Use a heated humidifier that fits your CPAP model to enhance your breathing comfort. Adjust the heat setting up if you get a dry nose or throat, down if you get condensation in the hose or mask.  Position the CPAP lower than you so that any condensation in the hose drains back into the machine rather than towards the mask.    Try a system that uses nasal pillows if traditional masks give you problems.    Clean your mask, tubing and headgear  once a week. Make sure the equipment dries fully.    Regularly check and replace the filters for your CPAP unit and humidifier.    Work closely with your sleep provider and your CPAP supplier to make sure that you have the machine, mask and air pressure setting that works best for you. It is better to stop using it and call your provider to solve problems than to lay awake all night frustrated with the device.      BESIDES CPAP, WHAT OTHER THERAPIES ARE THERE?        Positioning Device  Positioning devices are generally used when sleep apnea is mild and only occurs on your back.This example shows a pillow that straps around the waist. It may be appropriate for those whose sleep study shows milder sleep apnea that occurs primarily when lying flat on one's back. Preliminary studies have shown benefit but effectiveness at home may need to be verified by a home sleep test. These devices are generally not covered by medical insurance.                        Oral Appliance  What is oral appliance therapy?  An oral appliance is a small acrylic device that fits over the upper and lower teeth or tongue (similar to an orthodontic retainer or a mouth guard). This device slightly advances the lower jaw or tongue, which moves the base of the tongue forward, opens the airway, improves breathing and can effectively treat snoring and obstructive sleep apnea sleep apnea. The appliance is fabricated and customized by a qualified dentist with experience in treating snoring and sleep apnea. Oral appliances are usually well tolerated and have relatively high compliance by patients1, 2, 3.  When is an oral appliance indicated?  Oral appliance therapy is recommended as a first-line treatment for patients with primary snoring, mild sleep apnea, and for patients with moderate sleep apnea who prefer appliance therapy to use of CPAP4, 5. Severity of sleep apnea is determined by sleep testing and is based on the number of respiratory events  per hour of sleep.   How successful is oral appliance therapy?  The success rate of oral appliance therapy in patients with mild sleep apnea is 75-80% while in patients with moderate sleep apnea it is 50-70%. The chance of success in patients with severe sleep apnea is 40-50%. The research also shows that oral appliances have a beneficial effect on the cardiovascular health of KENY patients at the same magnitude as CPAP therapy7.  Oral appliances should be a second-line treatment in cases of severe sleep apnea, but if not completely successful then a combination therapy utilizing CPAP plus oral appliance therapy may be effective. Oral appliances tend to be effective in a broad range of patients although studies show that the patients who have the highest success are females, younger patients, those with milder disease, and less severe obesity. 3, 6.   The chances of success are lower in patients who have more severe KENY, are older, and those who are morbidly obese.     Example of an oral appliance   Finding a dentist that practices dental sleep medicine  Specific training is available through the American Academy of Dental Sleep Medicine for dentists interested in working in the field of sleep. To find a dentist who is educated in the field of sleep and the use of oral appliances, near you, visit the Web site of the American Academy of Dental Sleep Medicine; also see   http://www.accpstorage.org/newOrganization/patients/oralAppliances.pdf  To search for a dentist certified in these practices:  Http://aadsm.org/FindADentist.aspx?1  1. Leyda, et al. Objectively measured vs self-reported compliance during oral appliance therapy for sleep-disordered breathing. Chest 2013; 144(5): 9474-8104.  2. Kwan et al. Objective measurement of compliance during oral appliance therapy for sleep-disordered breathing. Thorax 2013; 68(1): 91-96.  3. Efren et al. Mandibular advancement devices in 620 men and women with  KENY and snoring: tolerability and predictors of treatment success. Chest 2004; 125: 0502-3583.  4. Gian et al. Oral appliances for snoring and KENY: a review. Sleep 2006; 29: 244-262.  5. Jon et al. Oral appliance treatment for KENY: an update. J Clin Sleep Med 2014; 10(2): 215-227.  6. Thomas et al. Predictors of OSAH treatment outcome. J Dent Res 2007; 86: 7696-4797.      Weight Loss:    Weight management is a personal decision.  If you are interested in exploring weight loss strategies, the following discussion covers the impact on weight loss on sleep apnea and the approaches that may be successful.    Weight loss decreases severity of sleep apnea in most people with obesity. For those with mild obesity who have developed snoring with weight gain, even 15-30 pound weight loss can improve and occasionally eliminate sleep apnea.  Structured and life-long dietary and health habits are necessary to lose weight and keep healthier weight levels.     Though there may be significant health benefits from weight loss, long-term weight loss is very difficult to achieve- studies show success with dietary management in less than 10% of people. In addition, substantial weight loss may require years of dietary control and may be difficult if patients have severe obesity. In these cases, surgical management may be considered.  Finally, older individuals who have tolerated obesity without health complications may be less likely to benefit from weight loss strategies.    Your BMI is Body mass index is 49.46 kg/(m^2).  Body mass index (BMI) is one way to tell whether you are at a healthy weight, overweight, or obese. It measures your weight in relation to your height.  A BMI of 18.5 to 24.9 is in the healthy range. A person with a BMI of 25 to 29.9 is considered overweight, and someone with a BMI of 30 or greater is considered obese. More than two-thirds of American adults are considered overweight or obese.  Being  overweight or obese increases the risk for further weight gain. Excess weight may lead to heart disease and diabetes.  Creating and following plans for healthy eating and physical activity may help you improve your health.  Weight control is part of healthy lifestyle and includes exercise, emotional health, and healthy eating habits. Careful eating habits lifelong are the mainstay of weight control. Though there are significant health benefits from weight loss, long-term weight loss with diet alone may be very difficult to achieve- studies show long-term success with dietary management in less than 10% of people. Attaining a healthy weight may be especially difficult to achieve in those with severe obesity. In some cases, medications, devices and surgical management might be considered.  What can you do?  If you are overweight or obese and are interested in methods for weight loss, you should discuss this with your provider.     Consider reducing daily calorie intake by 500 calories.     Keep a food journal.     Avoiding skipping meals, consider cutting portions instead.    Diet combined with exercise helps maintain muscle while optimizing fat loss. Strength training is particularly important for building and maintaining muscle mass. Exercise helps reduce stress, increase energy, and improves fitness. Increasing exercise without diet control, however, may not burn enough calories to loose weight.       Start walking three days a week 10-20 minutes at a time    Work towards walking thirty minutes five days a week     Eventually, increase the speed of your walking for 1-2 minutes at time    In addition, we recommend that you review healthy lifestyles and methods for weight loss available through the National Institutes of Health patient information sites:  http://win.niddk.nih.gov/publications/index.htm    And look into health and wellness programs that may be available through your health insurance provider,  employer, local community center, or john club.    Surgery:    Upper Airway Surgery for KENY  Surgery for KENY is a second-line treatment option in the management of sleep apnea.  Surgery should be considered for patients who are having a difficult time tolerating CPAP.    Surgery for KENY is directed at areas that are responsible for narrowing or complete obstruction of the airway during sleep.  There are a wide range of procedures available to enlarge and/or stabilize the airway to prevent blockage of breathing in the three major areas where it can occur: the palate, tongue, and nasal regions.  Successful surgical treatment depends on the accurate identification of the factors responsible for obstructive sleep apnea in each person.  A personalized approach is required because there is no single treatment that works well for everyone.  Because of anatomic variation, consultation with an examination by a sleep surgeon is a critical first step in determining what surgical options are best for each patient.  In some cases, examination during sedation may be recommended in order to guide the selection of procedures.  Patients will be counseled about risks and benefits as well as the typical recovery course after surgery. Surgery is typically not a cure for a person s KENY.  However, surgery will often significantly improve one s KENY severity (termed  success rate ).  Even in the absence of a cure, surgery will decrease the cardiovascular risk associated with OSA7; improve overall quality of life8 (sleepiness, functionality, sleep quality, etc).          Palate Procedures:  Patients with KENY often have narrowing of their airway in the region of their tonsils and uvula.  The goals of palate procedures are to widen the airway in this region as well as to help the tissues resist collapse.  Modern palate procedure techniques focus on tissue conservation and soft tissue rearrangement, rather than tissue removal.  Often the  uvula is preserved in this procedure. Residual sleep apnea is common in patient after pharyngoplasty with an average reduction in sleep apnea events of 33%2.      Tongue Procedures:  While patients are awake, the muscles that surround the throat are active and keep this region open for breathing. These muscles relax during sleep, allowing the tongue and other structures to collapse and block breathing.  There are several different tongue procedures available.  Selection of a tongue base procedure depends on characteristics seen on physical exam.  Generally, procedures are aimed at removing bulky tissues in this area or preventing the back of the tongue from falling back during sleep.  Success rates for tongue surgery range from 50-62%3.    Hypoglossal Nerve Stimulation:  Hypoglossal nerve stimulation has recently received approval from the [place] Food and Drug Administration for the treatment of obstructive sleep apnea.  This is based on research showing that the system was safe and effective in treating sleep apnea6.  Results showed that the median AHI score decreased 68%, from 29.3 to 9.0. This therapy uses an implant system that senses breathing patterns and delivers mild stimulation to airway muscles, which keeps the airway open during sleep.  The system consists of three fully implanted components: a small generator (similar in size to a pacemaker), a breathing sensor, and a stimulation lead.  Using a small handheld remote, a patient turns the therapy on before bed and off upon awakening.    Candidates for this device must be greater than 22 years of age, have moderate to severe KENY (AHI between 20-65), BMI less than 32, have tried CPAP/oral appliance without success, and have appropriate upper airway anatomy (determined by a sleep endoscopy performed by Dr. Allen).    Hypoglossal Nerve Stimulation Pathway:    The sleep surgeon s office will work with the patient through the insurance prior-authorization  process (including communications and appeals).    Nasal Procedures:  Nasal obstruction can interfere with nasal breathing during the day and night.  Studies have shown that relief of nasal obstruction can improve the ability of some patients to tolerate positive airway pressure therapy for obstructive sleep apnea1.  Treatment options include medications such as nasal saline, topical corticosteroid and antihistamine sprays, and oral medications such as antihistamines or decongestants. Non-surgical treatments can include external nasal dilators for selected patients. If these are not successful by themselves, surgery can improve the nasal airway either alone or in combination with these other options.    Combination Procedures:  Combination of surgical procedures and other treatments may be recommended, particularly if patients have more than one area of narrowing or persistent positional disease.  The success rate of combination surgery ranges from 66-80%2,3.      1. Samantha HONG. The Role of the Nose in Snoring and Obstructive Sleep Apnoea: An Update.  Eur Arch Otorhinolaryngol. 2011; 268: 1365-73.  2.  Wilda SM; Sena JA; Anahi JR; Pallanch JF; Doris MB; Yasmine SG; Taryn CARREON. Surgical modifications of the upper airway for obstructive sleep apnea in adults: a systematic review and meta-analysis. SLEEP 2010;33(10):7477-2038. Shaji PAIGE. Hypopharyngeal surgery in obstructive sleep apnea: an evidence-based medicine review.  Arch Otolaryngol Head Neck Surg. 2006 Feb;132(2):206-13.  3. Dominick YH1, Monica Y, Corona MARINA. The efficacy of anatomically based multilevel surgery for obstructive sleep apnea. Otolaryngol Head Neck Surg. 2003 Oct;129(4):327-35.  4. Kezirian E, Goldberg A. Hypopharyngeal Surgery in Obstructive Sleep Apnea: An Evidence-Based Medicine Review. Arch Otolaryngol Head Neck Surg. 2006 Feb;132(2):206-13.  5. Yissel SHORE et al. Upper-Airway Stimulation for Obstructive Sleep Apnea.  N Engl J Med. 2014 Juan Daniel  9;370(2):139-49.  6. Valeriy Y et al. Increased Incidence of Cardiovascular Disease in Middle-aged Men with Obstructive Sleep Apnea. Am J Respir Crit Care Med; 2002 166: 159-165  7. Lesa ESCOBAR et al. Studying Life Effects and Effectiveness of Palatopharyngoplasty (SLEEP) study: Subjective Outcomes of Isolated Uvulopalatopharyngoplasty. Otolaryngol Head Neck Surg. 2011; 144: 623-631.  Please, schedule CPAP  visit and follow up visit with the nurse. Use the CPAP every time you sleep (including naps).    Thanks!    Kenney Hector MD, MPH  Clinical Sleep and Occupational / Environmental Medicine            Follow-ups after your visit        Your next 10 appointments already scheduled     Oct 02, 2017  3:20 PM CDT   SHORT with Joe Ramirez PA-C   [facility]    [address] [place] [address], [facility] [address]  [facility] [address] [phone]              Who to contact     If you have questions or need follow up information about today's clinic visit or your schedule please contact [facility] directly at [phone].  Normal or non-critical lab and imaging results will be communicated to you by MyChart, letter or phone within 4 business days after the clinic has received the results. If you do not hear from us within 7 days, please contact the clinic through [employer] or phone. If you have a critical or abnormal lab result, we will notify you by phone as soon as possible.  Submit refill requests through [employer] or call your pharmacy and they will forward the refill request to us. Please allow 3 business days for your refill to be completed.          Additional Information About Your Visit        [employer] Information     [employer] gives you secure access to your electronic health record. If you see a primary care provider, you can also send messages to your care team and make appointments. If you have questions, please call your primary care clinic.  " If you do not have a primary care provider, please call [phone] and they will assist you.        Care EveryWhere ID     This is your Care EveryWhere ID. This could be used by other organizations to access your [facility] medical records  [id number]        Your Vitals Were     Pulse Respirations Height Pulse Oximetry BMI (Body Mass Index)       75 18 1.867 m (6' 1.5\") 97% 49.46 kg/m2        Blood Pressure from Last 3 Encounters:   09/29/17 154/88   09/26/17 148/88   09/13/17 144/78    Weight from Last 3 Encounters:   09/29/17 (!) 172.4 kg (380 lb)   08/28/17 (!) 172.4 kg (380 lb)   08/15/17 (!) 172.4 kg (380 lb)              We Performed the Following     Comprehensive DME        Primary Care Provider Office Phone # Fax #    Joe Ramirez PA-C [phone]       [address] [facility] [address]        Equal Access to Services     [facility]: Hadii aad ku hadasho Soomaali, waaxda luqadaha, qaybta kaalmada adeegyada, waxay lilibeth hayazalea kerr . So [facility] [phone].    ATENCIÓN: Si habla español, tiene a prescott disposición servicios gratuitos de asistencia lingüística. Llame al [phone].    We comply with applicable federal civil rights laws and [place] laws. We do not discriminate on the basis of race, color, national origin, age, disability, sex, sexual orientation, or gender identity.            Thank you!     Thank you for choosing [facility]  for your care. Our goal is always to provide you with excellent care. Hearing back from our patients is one way we can continue to improve our services. Please take a few minutes to complete the written survey that you may receive in the mail after your visit with us. Thank you!             Your Updated Medication List - Protect others around you: Learn how to safely use, store and throw away your medicines at www.disposemymeds.org.          This list is accurate as of: 9/29/17  4:04 PM.  Always " use your most recent med list.                   Brand Name Dispense Instructions for use Diagnosis    amLODIPine 10 MG tablet    NORVASC    90 tablet    Take 1 tablet (10 mg) by mouth daily    Essential hypertension       FLUoxetine 40 MG capsule    PROzac     TK 1 C PO D        metFORMIN 500 MG 24 hr tablet    GLUCOPHAGE-XR    60 tablet    Take 1 tablet (500 mg) by mouth 2 times daily (with meals)    IFG (impaired fasting glucose)       norgestim-eth estrad triphasic 0.18/0.215/0.25 MG-25 MCG per tablet    ORTHO TRI-CYCLEN LO    84 tablet    Take 1 tablet by mouth daily    Encounter for surveillance of contraceptive pills       propranolol 80 MG 24 hr capsule    INDERAL LA    30 capsule    Take 1 capsule (80 mg) by mouth daily    Essential hypertension

## 2017-09-29 NOTE — PATIENT INSTRUCTIONS
Your BMI is Body mass index is 49.46 kg/(m^2).  Weight management is a personal decision.  If you are interested in exploring weight loss strategies, the following discussion covers the approaches that may be successful. Body mass index (BMI) is one way to tell whether you are at a healthy weight, overweight, or obese. It measures your weight in relation to your height.  A BMI of 18.5 to 24.9 is in the healthy range. A person with a BMI of 25 to 29.9 is considered overweight, and someone with a BMI of 30 or greater is considered obese. More than two-thirds of American adults are considered overweight or obese.  Being overweight or obese increases the risk for further weight gain. Excess weight may lead to heart disease and diabetes.  Creating and following plans for healthy eating and physical activity may help you improve your health.  Weight control is part of healthy lifestyle and includes exercise, emotional health, and healthy eating habits. Careful eating habits lifelong are the mainstay of weight control. Though there are significant health benefits from weight loss, long-term weight loss with diet alone may be very difficult to achieve- studies show long-term success with dietary management in less than 10% of people. Attaining a healthy weight may be especially difficult to achieve in those with severe obesity. In some cases, medications, devices and surgical management might be considered.  What can you do?  If you are overweight or obese and are interested in methods for weight loss, you should discuss this with your provider.     Consider reducing daily calorie intake by 500 calories.     Keep a food journal.     Avoiding skipping meals, consider cutting portions instead.    Diet combined with exercise helps maintain muscle while optimizing fat loss. Strength training is particularly important for building and maintaining muscle mass. Exercise helps reduce stress, increase energy, and improves  fitness. Increasing exercise without diet control, however, may not burn enough calories to loose weight.       Start walking three days a week 10-20 minutes at a time    Work towards walking thirty minutes five days a week     Eventually, increase the speed of your walking for 1-2 minutes at time    In addition, we recommend that you review healthy lifestyles and methods for weight loss available through the National Institutes of Health patient information sites:  http://win.niddk.nih.gov/publications/index.htm    And look into health and wellness programs that may be available through your health insurance provider, employer, local community center, or john club.    Weight management plan: Patient was referred to their PCP to discuss a diet and exercise plan.     Your blood pressure was checked while you were in clinic today.  Please read the guidelines below about what these numbers mean and what you should do about them.  Your systolic blood pressure is the top number.  This is the pressure when the heart is pumping.  Your diastolic blood pressure is the bottom number.  This is the pressure in between beats.  If your systolic blood pressure is less than 120 and your diastolic blood pressure is less than 80, then your blood pressure is normal. There is nothing more that you need to do about it  If your systolic blood pressure is 120-139 or your diastolic blood pressure is 80-89, your blood pressure may be higher than it should be.  You should have your blood pressure re-checked within a year by a primary care provider.  If your systolic blood pressure is 140 or greater or your diastolic blood pressure is 90 or greater, you may have high blood pressure.  High blood pressure is treatable, but if left untreated over time it can put you at risk for heart attack, stroke, or kidney failure.  You should have your blood pressure re-checked by a primary care provider within the next four weeks.      MY TREATMENT  "INFORMATION FOR SLEEP APNEA -  Zenobia Cantu    DOCTOR: Kenney Hector MD, MPH  SLEEP CENTER : [facility]         If I haven't had a sleep study yet, what can I expect?  A personal story from Pradeep  https://www.Maxymiserube.com/watch?v=AxPLmlRpnCs      Am I having a home sleep study?  Here is a video in case you get home and want to make sure you have done it correctly  https://www.Maxymiserube.com/watch?v=FCT1S8hNet6&feature=youtu.be      Frequently asked questions:  1. What is Obstructive Sleep Apnea (KENY)? KENY is the most common type of sleep apnea. Apnea literally means, \"without breath.\" It is characterized by repetitive pauses in breathing, despite continued effort to breathe, and is usually associated with a reduction in blood oxygen saturation. Apneas can last 10 to over 60 seconds. It is caused by narrowing or collapse of the upper airway as muscles relax during sleep. Severity of sleep apnea is determined by frequency of breathing events and their effect on your sleep and oxygen levels determined during sleep testing.     2. What are the consequences of KENY? Symptoms include: daytime sleepiness- possibly increasing the risk of falling asleep while driving, unrefreshing/restless sleep, snoring, insomnia, waking frequently to urinate, waking with heartburn or reflux, reduced concentration and memory, and morning headaches. Other health consequences may include development of high blood pressure and other cardiovascular disease in persons who are susceptible. Untreated KENY  can contribute to heart disease, stroke and diabetes.     3. What are the treatment options? In most situations, sleep apnea is a lifelong disease that must be managed with daily therapy. Medications are not effective for sleep apnea and surgery is generally not performed until other therapies have been tried. Therapy is usually tailored to the individual patient based on many factors including your wishes as well as severity " of sleep apnea and severity of obesity. Continuous Positive Airway (CPAP) is the most reliable treatment. An oral device to hold your jaw forward is usually the next most reliable option. Other options include postioning devices (to keep you off your back), weight loss, and surgery including a tongue pacing device. There is more detail about some of these options below.            1. CPAP-  WHAT DOES IT DO AND HOW CAN I LEARN TO WEAR IT?                               BEFORE I START, CAN I WATCH A MOVIE TO GET A PLAN ON HOW TO USE CPAP?  https://www.StratusLIVE.com/watch?b=w9N33kl040F      Continuous positive airway pressure, or CPAP, is the most effective treatment for obstructive sleep apnea. It works by blowing room air, through a mask, to hold your throat open. A decision to use CPAP is a major step forward in the pursuit of a healthier life. The successful use of CPAP will help you breathe easier, sleep better and live healthier. You can choose CPAP equipment from any durable medical equipment provider that meets your needs.  Using CPAP can be a positive experience if you keep these encinas points in mind:  1. Commitment  CPAP is not a quick fix for your problem. It involves a long-term commitment to improve your sleep and your health.    2. Communication  Stay in close communication with both your sleep doctor and your CPAP supplier. Ask lots of questions and seek help when you need it.    3. Consistency  Use CPAP all night, every night and for every nap. You will receive the maximum health benefits from CPAP when you use it every time that you sleep. This will also make it easier for your body to adjust to the treatment.    4. Correction  The first machine and mask that you try may not be the best ones for you. Work with your sleep doctor and your CPAP supplier to make corrections to your equipment selection. Ask about trying a different type of machine or mask if you have ongoing problems. Make sure that your mask is  "a good fit and learn to use your equipment properly.    5. Challenge  Tell a family member or close friend to ask you each morning if you used your CPAP the previous night. Have someone to challenge you to give it your best effort.    6. Connection   Your adjustment to CPAP will be easier if you are able to connect with others who use the same treatment. Ask your sleep doctor if there is a support group in your area for people who have sleep apnea, or look for one on the Internet.  7. Comfort   Increase your level of comfort by using a saline spray, decongestant or heated humidifier if CPAP irritates your nose, mouth or throat. Use your unit's \"ramp\" setting to slowly get used to the air pressure level. There may be soft pads you can buy that will fit over your mask straps. Look on www.CPAP.com for accessories that can help make CPAP use more comfortable.  8. Cleaning   Clean your mask, tubing and headgear on a regular basis. Put this time in your schedule so that you don't forget to do it. Check and replace the filters for your CPAP unit and humidifier.    9. Completion   Although you are never finished with CPAP therapy, you should reward yourself by celebrating the completion of your first month of treatment. Expect this first month to be your hardest period of adjustment. It will involve some trial and error as you find the machine, mask and pressure settings that are right for you.    10. Continuation  After your first month of treatment, continue to make a daily commitment to use your CPAP all night, every night and for every nap.    CPAP-Tips to starting with success:  Begin using your CPAP for short periods of time during the day while you watch TV or read.    Use CPAP every night and for every nap. Using it less often reduces the health benefits and makes it harder for your body to get used to it.    Make small adjustments to your mask, tubing, straps and headgear until you get the right fit. Tightening the " mask may actually worsen the leak.  If it leaves significant marks on your face or irritates the bridge of your nose, it may not be the best mask for you.  Speak with the person who supplied the mask and consider trying other masks. Insurances will allow you to try different masks during the first month of starting CPAP.  Insurance also covers a new mask, hose and filter about every 6 months.    Use a saline nasal spray to ease mild nasal congestion. Neti-Pot or saline nasal rinses may also help. Nasal gel sprays can help reduce nasal dryness.  Biotene mouthwash can be helpful to protect your teeth if you experience frequent dry mouth.  Dry mouth may be a sign of air escaping out of your mouth or out of the mask in the case of a full face mask.  Speak with your provider if you expect that is the case.     Take a nasal decongestant to relieve more severe nasal or sinus congestion.  Do not use Afrin (oxymetazoline) nasal spray more than 3 days in a row.  Speak with your sleep doctor if your nasal congestion is chronic.    Use a heated humidifier that fits your CPAP model to enhance your breathing comfort. Adjust the heat setting up if you get a dry nose or throat, down if you get condensation in the hose or mask.  Position the CPAP lower than you so that any condensation in the hose drains back into the machine rather than towards the mask.    Try a system that uses nasal pillows if traditional masks give you problems.    Clean your mask, tubing and headgear once a week. Make sure the equipment dries fully.    Regularly check and replace the filters for your CPAP unit and humidifier.    Work closely with your sleep provider and your CPAP supplier to make sure that you have the machine, mask and air pressure setting that works best for you. It is better to stop using it and call your provider to solve problems than to lay awake all night frustrated with the device.      BESIDES CPAP, WHAT OTHER THERAPIES ARE  THERE?        Positioning Device  Positioning devices are generally used when sleep apnea is mild and only occurs on your back.This example shows a pillow that straps around the waist. It may be appropriate for those whose sleep study shows milder sleep apnea that occurs primarily when lying flat on one's back. Preliminary studies have shown benefit but effectiveness at home may need to be verified by a home sleep test. These devices are generally not covered by medical insurance.                        Oral Appliance  What is oral appliance therapy?  An oral appliance is a small acrylic device that fits over the upper and lower teeth or tongue (similar to an orthodontic retainer or a mouth guard). This device slightly advances the lower jaw or tongue, which moves the base of the tongue forward, opens the airway, improves breathing and can effectively treat snoring and obstructive sleep apnea sleep apnea. The appliance is fabricated and customized by a qualified dentist with experience in treating snoring and sleep apnea. Oral appliances are usually well tolerated and have relatively high compliance by patients1, 2, 3.  When is an oral appliance indicated?  Oral appliance therapy is recommended as a first-line treatment for patients with primary snoring, mild sleep apnea, and for patients with moderate sleep apnea who prefer appliance therapy to use of CPAP4, 5. Severity of sleep apnea is determined by sleep testing and is based on the number of respiratory events per hour of sleep.   How successful is oral appliance therapy?  The success rate of oral appliance therapy in patients with mild sleep apnea is 75-80% while in patients with moderate sleep apnea it is 50-70%. The chance of success in patients with severe sleep apnea is 40-50%. The research also shows that oral appliances have a beneficial effect on the cardiovascular health of KENY patients at the same magnitude as CPAP therapy7.  Oral appliances should be  a second-line treatment in cases of severe sleep apnea, but if not completely successful then a combination therapy utilizing CPAP plus oral appliance therapy may be effective. Oral appliances tend to be effective in a broad range of patients although studies show that the patients who have the highest success are females, younger patients, those with milder disease, and less severe obesity. 3, 6.   The chances of success are lower in patients who have more severe KENY, are older, and those who are morbidly obese.     Example of an oral appliance   Finding a dentist that practices dental sleep medicine  Specific training is available through the American Academy of Dental Sleep Medicine for dentists interested in working in the field of sleep. To find a dentist who is educated in the field of sleep and the use of oral appliances, near you, visit the Web site of the American Academy of Dental Sleep Medicine; also see   http://www.accpstorage.org/newOrganization/patients/oralAppliances.pdf  To search for a dentist certified in these practices:  Http://aadsm.org/FindADentist.aspx?1  1. Leyda et al. Objectively measured vs self-reported compliance during oral appliance therapy for sleep-disordered breathing. Chest 2013; 144(5): 4779-0433.  2. Kwan, et al. Objective measurement of compliance during oral appliance therapy for sleep-disordered breathing. Thorax 2013; 68(1): 91-96.  3. Efren et al. Mandibular advancement devices in 620 men and women with KENY and snoring: tolerability and predictors of treatment success. Chest 2004; 125: 9930-4342.  4. Gian, et al. Oral appliances for snoring and KENY: a review. Sleep 2006; 29: 244-262.  5. Jon et al. Oral appliance treatment for KENY: an update. J Clin Sleep Med 2014; 10(2): 215-227.  6. Thomas et al. Predictors of OSAH treatment outcome. J Dent Res 2007; 86: 0424-6630.      Weight Loss:    Weight management is a personal decision.  If you are  interested in exploring weight loss strategies, the following discussion covers the impact on weight loss on sleep apnea and the approaches that may be successful.    Weight loss decreases severity of sleep apnea in most people with obesity. For those with mild obesity who have developed snoring with weight gain, even 15-30 pound weight loss can improve and occasionally eliminate sleep apnea.  Structured and life-long dietary and health habits are necessary to lose weight and keep healthier weight levels.     Though there may be significant health benefits from weight loss, long-term weight loss is very difficult to achieve- studies show success with dietary management in less than 10% of people. In addition, substantial weight loss may require years of dietary control and may be difficult if patients have severe obesity. In these cases, surgical management may be considered.  Finally, older individuals who have tolerated obesity without health complications may be less likely to benefit from weight loss strategies.    Your BMI is Body mass index is 49.46 kg/(m^2).  Body mass index (BMI) is one way to tell whether you are at a healthy weight, overweight, or obese. It measures your weight in relation to your height.  A BMI of 18.5 to 24.9 is in the healthy range. A person with a BMI of 25 to 29.9 is considered overweight, and someone with a BMI of 30 or greater is considered obese. More than two-thirds of American adults are considered overweight or obese.  Being overweight or obese increases the risk for further weight gain. Excess weight may lead to heart disease and diabetes.  Creating and following plans for healthy eating and physical activity may help you improve your health.  Weight control is part of healthy lifestyle and includes exercise, emotional health, and healthy eating habits. Careful eating habits lifelong are the mainstay of weight control. Though there are significant health benefits from weight  loss, long-term weight loss with diet alone may be very difficult to achieve- studies show long-term success with dietary management in less than 10% of people. Attaining a healthy weight may be especially difficult to achieve in those with severe obesity. In some cases, medications, devices and surgical management might be considered.  What can you do?  If you are overweight or obese and are interested in methods for weight loss, you should discuss this with your provider.     Consider reducing daily calorie intake by 500 calories.     Keep a food journal.     Avoiding skipping meals, consider cutting portions instead.    Diet combined with exercise helps maintain muscle while optimizing fat loss. Strength training is particularly important for building and maintaining muscle mass. Exercise helps reduce stress, increase energy, and improves fitness. Increasing exercise without diet control, however, may not burn enough calories to loose weight.       Start walking three days a week 10-20 minutes at a time    Work towards walking thirty minutes five days a week     Eventually, increase the speed of your walking for 1-2 minutes at time    In addition, we recommend that you review healthy lifestyles and methods for weight loss available through the National Institutes of Health patient information sites:  http://win.niddk.nih.gov/publications/index.htm    And look into health and wellness programs that may be available through your health insurance provider, employer, local community center, or john club.    Surgery:    Upper Airway Surgery for KENY  Surgery for KENY is a second-line treatment option in the management of sleep apnea.  Surgery should be considered for patients who are having a difficult time tolerating CPAP.    Surgery for KENY is directed at areas that are responsible for narrowing or complete obstruction of the airway during sleep.  There are a wide range of procedures available to enlarge and/or  stabilize the airway to prevent blockage of breathing in the three major areas where it can occur: the palate, tongue, and nasal regions.  Successful surgical treatment depends on the accurate identification of the factors responsible for obstructive sleep apnea in each person.  A personalized approach is required because there is no single treatment that works well for everyone.  Because of anatomic variation, consultation with an examination by a sleep surgeon is a critical first step in determining what surgical options are best for each patient.  In some cases, examination during sedation may be recommended in order to guide the selection of procedures.  Patients will be counseled about risks and benefits as well as the typical recovery course after surgery. Surgery is typically not a cure for a person s KENY.  However, surgery will often significantly improve one s KENY severity (termed  success rate ).  Even in the absence of a cure, surgery will decrease the cardiovascular risk associated with OSA7; improve overall quality of life8 (sleepiness, functionality, sleep quality, etc).          Palate Procedures:  Patients with KENY often have narrowing of their airway in the region of their tonsils and uvula.  The goals of palate procedures are to widen the airway in this region as well as to help the tissues resist collapse.  Modern palate procedure techniques focus on tissue conservation and soft tissue rearrangement, rather than tissue removal.  Often the uvula is preserved in this procedure. Residual sleep apnea is common in patient after pharyngoplasty with an average reduction in sleep apnea events of 33%2.      Tongue Procedures:  While patients are awake, the muscles that surround the throat are active and keep this region open for breathing. These muscles relax during sleep, allowing the tongue and other structures to collapse and block breathing.  There are several different tongue procedures available.   Selection of a tongue base procedure depends on characteristics seen on physical exam.  Generally, procedures are aimed at removing bulky tissues in this area or preventing the back of the tongue from falling back during sleep.  Success rates for tongue surgery range from 50-62%3.    Hypoglossal Nerve Stimulation:  Hypoglossal nerve stimulation has recently received approval from the [place] Food and Drug Administration for the treatment of obstructive sleep apnea.  This is based on research showing that the system was safe and effective in treating sleep apnea6.  Results showed that the median AHI score decreased 68%, from 29.3 to 9.0. This therapy uses an implant system that senses breathing patterns and delivers mild stimulation to airway muscles, which keeps the airway open during sleep.  The system consists of three fully implanted components: a small generator (similar in size to a pacemaker), a breathing sensor, and a stimulation lead.  Using a small handheld remote, a patient turns the therapy on before bed and off upon awakening.    Candidates for this device must be greater than 22 years of age, have moderate to severe KENY (AHI between 20-65), BMI less than 32, have tried CPAP/oral appliance without success, and have appropriate upper airway anatomy (determined by a sleep endoscopy performed by Dr. Allen).    Hypoglossal Nerve Stimulation Pathway:    The sleep surgeon s office will work with the patient through the insurance prior-authorization process (including communications and appeals).    Nasal Procedures:  Nasal obstruction can interfere with nasal breathing during the day and night.  Studies have shown that relief of nasal obstruction can improve the ability of some patients to tolerate positive airway pressure therapy for obstructive sleep apnea1.  Treatment options include medications such as nasal saline, topical corticosteroid and antihistamine sprays, and oral medications such as  antihistamines or decongestants. Non-surgical treatments can include external nasal dilators for selected patients. If these are not successful by themselves, surgery can improve the nasal airway either alone or in combination with these other options.    Combination Procedures:  Combination of surgical procedures and other treatments may be recommended, particularly if patients have more than one area of narrowing or persistent positional disease.  The success rate of combination surgery ranges from 66-80%2,3.      1. Samantha HONG. The Role of the Nose in Snoring and Obstructive Sleep Apnoea: An Update.  Eur Arch Otorhinolaryngol. 2011; 268: 1365-73.  2.  Wilda SM; Sena JA; Anahi JR; Pallanch JF; Doris MB; Yasmine SG; Taryn CARREON. Surgical modifications of the upper airway for obstructive sleep apnea in adults: a systematic review and meta-analysis. SLEEP 2010;33(10):9762-7869. Shaji PAIGE. Hypopharyngeal surgery in obstructive sleep apnea: an evidence-based medicine review.  Arch Otolaryngol Head Neck Surg. 2006 Feb;132(2):206-13.  3. Dominick SAEZH1, Monica Y, Corona MARINA. The efficacy of anatomically based multilevel surgery for obstructive sleep apnea. Otolaryngol Head Neck Surg. 2003 Oct;129(4):327-35.  4. Shaji PAIGE, Goldberg A. Hypopharyngeal Surgery in Obstructive Sleep Apnea: An Evidence-Based Medicine Review. Arch Otolaryngol Head Neck Surg. 2006 Feb;132(2):206-13.  5. Yissel SHORE et al. Upper-Airway Stimulation for Obstructive Sleep Apnea.  N Engl J Med. 2014 Jan 9;370(2):139-49.  6. Valeriy Y et al. Increased Incidence of Cardiovascular Disease in Middle-aged Men with Obstructive Sleep Apnea. Am J Respir Crit Care Med; 2002 166: 159-165  7. Lesa ESCOBAR et al. Studying Life Effects and Effectiveness of Palatopharyngoplasty (SLEEP) study: Subjective Outcomes of Isolated Uvulopalatopharyngoplasty. Otolaryngol Head Neck Surg. 2011; 144: 623-631.  Please, schedule CPAP  visit and follow up visit with the nurse. Use  the CPAP every time you sleep (including naps).    Thanks!    Kenney Hector MD, MPH  Clinical Sleep and Occupational / Environmental Medicine

## 2017-10-03 ENCOUNTER — OFFICE VISIT (OUTPATIENT)
Dept: FAMILY MEDICINE | Facility: CLINIC | Age: 27
End: 2017-10-03
Payer: COMMERCIAL

## 2017-10-03 VITALS
WEIGHT: 293 LBS | HEART RATE: 84 BPM | DIASTOLIC BLOOD PRESSURE: 84 MMHG | SYSTOLIC BLOOD PRESSURE: 160 MMHG | BODY MASS INDEX: 49.33 KG/M2 | RESPIRATION RATE: 16 BRPM | TEMPERATURE: 98.6 F

## 2017-10-03 DIAGNOSIS — I10 ESSENTIAL HYPERTENSION: ICD-10-CM

## 2017-10-03 PROCEDURE — 99213 OFFICE O/P EST LOW 20 MIN: CPT | Performed by: PHYSICIAN ASSISTANT

## 2017-10-03 RX ORDER — PROPRANOLOL HYDROCHLORIDE 80 MG/1
80 CAPSULE, EXTENDED RELEASE ORAL DAILY
Qty: 30 CAPSULE | Refills: 1 | Status: CANCELLED | OUTPATIENT
Start: 2017-10-03

## 2017-10-03 RX ORDER — PROPRANOLOL HYDROCHLORIDE 160 MG/1
160 CAPSULE, EXTENDED RELEASE ORAL DAILY
Qty: 30 CAPSULE | Refills: 1 | Status: SHIPPED | OUTPATIENT
Start: 2017-10-03 | End: 2018-01-22

## 2017-10-03 NOTE — PROGRESS NOTES
SUBJECTIVE:                                                    Zenobia Cantu is a 27 year old female who presents to clinic today for the following health issues:      History of Present Illness   Hypertension:     Outpatient blood pressures:  Are being checked    Blood pressures checked at:  Another location    Dietary sodium intake::  Other    BP Readings from Last 6 Encounters:   10/03/17 160/84   09/29/17 154/88   09/26/17 148/88   09/13/17 144/78   09/05/17 162/90   08/28/17 162/78     Zenobia is here for follow up of her blood pressure.  It is being checked here at the clinic and on occasion when she has other medical appts.  It has been a little difficult at this point to control.  Got sleep study results- moderate sleep apnea and will be getting CPAP hopefully this or next week.  Feeling well on new medication.  When checking BP is getting about 150 systolic.      Problem list and histories reviewed & adjusted, as indicated.  Additional history: as documented    ROS:  Constitutional, HEENT, cardiovascular, pulmonary, gi and gu systems are negative, except as otherwise noted.      OBJECTIVE:   /84 (BP Location: Right arm, Patient Position: Sitting, Cuff Size: Adult Large)  Pulse 84  Temp 98.6  F (37  C) (Oral)  Resp 16  Wt (!) 379 lb (171.9 kg)  BMI 49.33 kg/m2  Body mass index is 49.33 kg/(m^2).  GENERAL: healthy, alert and no distress  NECK: no adenopathy, no asymmetry, masses, or scars and thyroid normal to palpation  CV: regular rate and rhythm, normal S1 S2, no S3 or S4, no murmur, click or rub, no peripheral edema and peripheral pulses strong  MS: no gross musculoskeletal defects noted, no edema  SKIN: no suspicious lesions or rashes  PSYCH: mentation appears normal, affect normal/bright    Diagnostic Test Results:  none    ASSESSMENT/PLAN:   1. Essential hypertension  Increase dose, not completely controlled yet.  She will return to clinic for a nurse BP check in 1-2 weeks.  - propranolol  (INDERAL LA) 160 MG 24 hr capsule; Take 1 capsule (160 mg) by mouth daily  Dispense: 30 capsule; Refill: 1        Joe Ramirez PA-C  [facility]

## 2017-10-03 NOTE — NURSING NOTE
"Chief Complaint   Patient presents with     Hypertension       Initial /84 (BP Location: Right arm, Patient Position: Sitting, Cuff Size: Adult Large)  Pulse 84  Temp 98.6  F (37  C) (Oral)  Resp 16  Wt (!) 379 lb (171.9 kg)  BMI 49.33 kg/m2 Estimated body mass index is 49.33 kg/(m^2) as calculated from the following:    Height as of 9/29/17: 6' 1.5\" (1.867 m).    Weight as of this encounter: 379 lb (171.9 kg).  Medication Reconciliation: complete   Olivia Kelly MA      "

## 2017-10-03 NOTE — MR AVS SNAPSHOT
After Visit Summary   10/3/2017    Zenobia Cantu    MRN: [id number]           Patient Information     Date Of Birth          1990        Visit Information        Provider Department      10/3/2017 3:20 PM Joe Ramirez PA-C [facility]        Today's Diagnoses     Essential hypertension           Follow-ups after your visit        Follow-up notes from your care team     Return in about 2 months (around 12/3/2017).      Your next 10 appointments already scheduled     Oct 18, 2017  3:30 PM CDT   MyChart Endocrine Return with LENORA Javier CNP   [facility]    [address]  [facility] [id number] [phone]            Nov 14, 2017  4:30 PM CST   Return Sleep Patient with Kenney Hector MD   [facility] ([facility])    [address]  [facility] [phone]              Who to contact     If you have questions or need follow up information about today's clinic visit or your schedule please contact [facility] directly at [phone].  Normal or non-critical lab and imaging results will be communicated to you by MyChart, letter or phone within 4 business days after the clinic has received the results. If you do not hear from us within 7 days, please contact the clinic through iHandlehart or phone. If you have a critical or abnormal lab result, we will notify you by phone as soon as possible.  Submit refill requests through leaselock or call your pharmacy and they will forward the refill request to us. Please allow 3 business days for your refill to be completed.          Additional Information About Your Visit        MyChart Information     leaselock gives you secure access to your electronic health record. If you see a primary care provider, you can also send messages to your care team and make appointments. If you  have questions, please call your primary care clinic.  If you do not have a primary care provider, please call [phone] and they will assist you.        Care EveryWhere ID     This is your Care EveryWhere ID. This could be used by other organizations to access your [place] medical records  [id number]        Your Vitals Were     Pulse Temperature Respirations BMI (Body Mass Index)          84 98.6  F (37  C) (Oral) 16 49.33 kg/m2         Blood Pressure from Last 3 Encounters:   10/03/17 160/84   09/29/17 154/88   09/26/17 148/88    Weight from Last 3 Encounters:   10/03/17 (!) 379 lb (171.9 kg)   09/29/17 (!) 380 lb (172.4 kg)   08/28/17 (!) 380 lb (172.4 kg)              Today, you had the following     No orders found for display         Today's Medication Changes          These changes are accurate as of: 10/3/17  3:50 PM.  If you have any questions, ask your nurse or doctor.               These medicines have changed or have updated prescriptions.        Dose/Directions    propranolol 160 MG 24 hr capsule   Commonly known as:  INDERAL LA   This may have changed:    - medication strength  - how much to take   Used for:  Essential hypertension   Changed by:  Joe Ramirez PA-C        Dose:  160 mg   Take 1 capsule (160 mg) by mouth daily   Quantity:  30 capsule   Refills:  1            Where to get your medicines      [facility] [address] [place] [address] [address] [facility] [address]  [address] [facility] [address]     Phone:  [phone]     propranolol 160 MG 24 hr capsule                Primary Care Provider Office Phone # Fax #    Joe Ramirez PA-C [phone]  [facility] [address]        Equal Access to Services     [facility] ASHLEY AH: Hadii aad ku hadasho Soabdiazizali, waaxda luqadaha, qaybta kaalmada adeegyada, waxay lilibeth lemos. So [facility] [phone].    ATENCIÓN: Lis hendricks  español, tiene a prescott disposición servicios gratuitos de asistencia lingüística. Marcio coleman [phone].    We comply with applicable federal civil rights laws and [place] laws. We do not discriminate on the basis of race, color, national origin, age, disability, sex, sexual orientation, or gender identity.            Thank you!     Thank you for choosing [facility]  for your care. Our goal is always to provide you with excellent care. Hearing back from our patients is one way we can continue to improve our services. Please take a few minutes to complete the written survey that you may receive in the mail after your visit with us. Thank you!             Your Updated Medication List - Protect others around you: Learn how to safely use, store and throw away your medicines at www.disposemymeds.org.          This list is accurate as of: 10/3/17  3:50 PM.  Always use your most recent med list.                   Brand Name Dispense Instructions for use Diagnosis    amLODIPine 10 MG tablet    NORVASC    90 tablet    Take 1 tablet (10 mg) by mouth daily    Essential hypertension       FLUoxetine 40 MG capsule    PROzac     TK 1 C PO D        metFORMIN 500 MG 24 hr tablet    GLUCOPHAGE-XR    60 tablet    Take 1 tablet (500 mg) by mouth 2 times daily (with meals)    IFG (impaired fasting glucose)       norgestim-eth estrad triphasic 0.18/0.215/0.25 MG-25 MCG per tablet    ORTHO TRI-CYCLEN LO    84 tablet    Take 1 tablet by mouth daily    Encounter for surveillance of contraceptive pills       propranolol 160 MG 24 hr capsule    INDERAL LA    30 capsule    Take 1 capsule (160 mg) by mouth daily    Essential hypertension

## 2017-11-06 ENCOUNTER — OFFICE VISIT (OUTPATIENT)
Dept: ENDOCRINOLOGY | Facility: CLINIC | Age: 27
End: 2017-11-06
Payer: COMMERCIAL

## 2017-11-06 VITALS
RESPIRATION RATE: 16 BRPM | TEMPERATURE: 98.5 F | HEART RATE: 67 BPM | WEIGHT: 293 LBS | BODY MASS INDEX: 50.37 KG/M2 | SYSTOLIC BLOOD PRESSURE: 142 MMHG | DIASTOLIC BLOOD PRESSURE: 76 MMHG

## 2017-11-06 DIAGNOSIS — E66.01 MORBID OBESITY (H): Primary | ICD-10-CM

## 2017-11-06 DIAGNOSIS — R73.03 PREDIABETES: ICD-10-CM

## 2017-11-06 DIAGNOSIS — I10 ESSENTIAL HYPERTENSION: ICD-10-CM

## 2017-11-06 PROCEDURE — 99214 OFFICE O/P EST MOD 30 MIN: CPT | Performed by: CLINICAL NURSE SPECIALIST

## 2017-11-06 NOTE — NURSING NOTE
"Chief Complaint   Patient presents with     Weight Loss     go over sleep study results       Initial /76 (BP Location: Left arm, Patient Position: Chair, Cuff Size: Adult Large)  Pulse 67  Temp 98.5  F (36.9  C) (Oral)  Resp 16  Wt (!) 387 lb (175.5 kg)  BMI 50.37 kg/m2 Estimated body mass index is 50.37 kg/(m^2) as calculated from the following:    Height as of 9/29/17: 6' 1.5\" (1.867 m).    Weight as of this encounter: 387 lb (175.5 kg).  Medication Reconciliation: complete    "

## 2017-11-06 NOTE — PATIENT INSTRUCTIONS
Start Contrave and titrate:  Week 1: one table daily with food - breakfast.  Week 2: one tablet with breakfast, one tablet with dinner  Week 3: two tablets with breakfast, one tablet with dinner  Week 4 and thereafter: two tablets twice daily.    You can stay at one of the lower doses if needed due to side.    Follow up with me in 1-3 months depending on how you are doing with the medication.    [facility] program.  There are multiple handouts with dieting recommendations.    Darya Fuentes NP  Endocrinology

## 2017-11-06 NOTE — PROGRESS NOTES
Name: Zenobia Cantu  F/u for obesity (Last seen 8/2/2017).  HPI:  Zenobia Cantu is a 27 year old female who presents for the evaluation/managment of obestiy  Has tried many different things for weight loss - over the counter weight loss supplements, weight watchers online, food diary/calorie tracking.  History of eating disorder. Binge eating disorder.  Currently not in an eating disorder program.      Menses: menarche age 13, cycles regular, currently on birth control - now periods shorter with lighter flow.  Diarrhea/Constipation:No chronic diarrhea or constipation problems, stress causes intermittent constipation  Changes in Hair or Skin:No  Diabetes:No  Sleep: no problems falling or staying asleep  Sleep Apnea/Snores:Yes: snores, no previous sleep study  Hypertension or CAD:yes, recently started norvasc two days ago   Vital Signs 9/29/2017 10/3/2017 11/6/2017   Systolic 154 160 142   Diastolic 88 84 76     Hyperlipidemia:No  Hirsutism:No  Easy Brusing:Yes: bruises easily  Use of Steroids:No  Family history of Obesity:No  Diet: portion control  Exercise:Yes: 5 days per week, cardio and weights, [employer], working out up to 1.5 hours per day.  PMH/PSH:  Past Medical History:   Diagnosis Date     Depressive disorder 2010     Essential hypertension 8/28/2017     History reviewed. No pertinent surgical history.  Family Hx:  Family History   Problem Relation Age of Onset     Hypertension Father      He lives a healthy lifestyle     Depression Father      Hypertension Paternal Grandmother      Hypertension Other      Believe my grandmother's mom had problems too     Thyroid disease: No         DM2: Yes: MGM         Autoimmune: DM1, SLE, RA, Vitiligo No    Social Hx:  Social History     Social History     Marital status:      Spouse name: N/A     Number of children: N/A     Years of education: N/A     Occupational History     Not on file.     Social History Main Topics     Smoking status: Never Smoker      Smokeless tobacco: Never Used     Alcohol use Yes      Comment: rarely     Drug use: No     Sexual activity: Yes     Partners: Male     Birth control/ protection: Pill     Other Topics Concern     Parent/Sibling W/ Cabg, Mi Or Angioplasty Before 65f 55m? No     Social History Narrative          MEDICATIONS:  has a current medication list which includes the following prescription(s): naltrexone-bupropion, propranolol, amlodipine, metformin, norgestim-eth estrad triphasic, and fluoxetine.    ROS   ROS: 10 point ROS neg other than the symptoms noted above in the HPI.    GENERAL: no weight loss, fevers, chills, malaise, night sweats.   HEENT: no dysphagia, odonophagia, diplopia, neck pain or tenderness  CV: no chest pain, pressure, palpitations, skipped beats, LOC  LUNGS: no SOB, HELM, cough, sputum production, wheezing   ABDOMEN: no diarrhea, constipation, abdominal pain  EXTREMITIES: no rashes, ulcers, edema  NEUROLOGY: no changes in vision, tingling or numbness in hands or feet.   MSK: no muscle aches or pains, weakness  SKIN: no rashes or lesions  ENDOCRINE: no heat or cold intolerance    Physical Exam   VS: /76 (BP Location: Left arm, Patient Position: Chair, Cuff Size: Adult Large)  Pulse 67  Temp 98.5  F (36.9  C) (Oral)  Resp 16  Wt (!) 175.5 kg (387 lb)  BMI 50.37 kg/m2  GENERAL: AXOX3, NAD, well dressed, answering questions appropriately, appears stated age.  HEENT: OP clear, no LAD, no TM, non-tender, no exopthalmous, no proptosis, EOMI, no lig lag, no retraction  NECK:  Supple, no thyromegaly, no adenopathy  CV: RRR, no rubs, gallops, no murmurs  LUNGS: CTAB, no wheezes, rales, or ronchi  ABDOMEN: soft, nontender, nondistended, no broad striae noted.  EXTREMITIES: no edema, +pulses, no rashes, no lesions  NEUROLOGY: CN grossly intact, no tremors  MSK: grossly intact  SKIN: no acanthosis, skin tags; no rashes, no lesions, no intertrigo    LABS:  !COMPREHENSIVE Latest Ref Rng & Units 7/25/2017  "  SODIUM 133 - 144 mmol/L 139   POTASSIUM 3.4 - 5.3 mmol/L 4.4   CHLORIDE 94 - 109 mmol/L 106   BUN 7 - 30 mg/dL 10   Creatinine 0.52 - 1.04 mg/dL 0.79   Glucose 70 - 99 mg/dL 89   ANION GAP 3 - 14 mmol/L 5   CALCIUM 8.5 - 10.1 mg/dL 8.7     Component    Latest Ref Rng & Units 8/12/2017   Glucose    70 - 99 mg/dL 103 (H)   Insulin   3 - 25 mU/L 21.7   C-Peptide    0.9 - 6.9 ng/mL 3.3   Hemoglobin A1C    4.3 - 6.0 % 5.7     !THYROID Latest Ref Rng & Units 7/25/2017   TSH 0.40 - 4.00 mU/L 1.90     Component    Latest Ref Rng & Units 7/25/2017   WBC    4.0 - 11.0 10e9/L 5.8   RBC Count    3.8 - 5.2 10e12/L 4.37   Hemoglobin    11.7 - 15.7 g/dL 13.3   Hematocrit    35.0 - 47.0 % 40.3     Vital Signs 8/2/2017   Weight (LB) 383 lb 14.4 oz     Vital Signs 9/29/2017 10/3/2017 11/6/2017   Weight (LB) 380 lb 379 lb 387 lb   Height 6' 1.5\"     BMI (Calculated) 49.56         Waist Circumference: 63\" (8/2/2017).  62\" (today)    All pertinent notes, labs, and images personally reviewed by me.     A/P  Ms.Amanda Cantu is a 27 year old here for the evaluation of obestiy:    1. Morbid Obesity-  BMI > 50 with comorbidities - HTN, prediabetes, KENY.    Obesity is associated with a significant increase in mortality and risk of many disorders, including diabetes mellitus, hypertension, dyslipidemia, heart disease, stroke, sleep apnea, cancer, and many others. Conversely, weight loss is associated with a reduction in obesity-associated morbidity.    Endocrine evaluation of obesity includes Diabetes/prediabetes, Cushing's and thyroid dysfunction.  The relevant work up for the diagnosis and management of obesity and various treatment options were discussed. Body Mass Index (BMI) has been a standard means for calculating risk for overweight and obesity. The new American Association of Clinical Endocrinology (AACE) algorithm recommends lifestyle modifications as the initial phase of treatment for all patients with the BMI equal or greater " than 25 kg/m2. Lifestyle modifications includes use of medical nutrition therapy, exercise, tobacco cessation, adequate quality and quantity of sleep, limited consumption of alcohol and reduced stress through implementation of a structured, multidisciplinary program.    In patients with complications associated with obesity, graded interventions are recommended including pharmacological therapy such as phentermine, orlistat, lorcaserin and phentermine/topiramate ER, contrave ( buproprion/naltreone) and the use of very low calorie meal replacement programs.    If medical intervention is insufficient, surgical therapy may be considered, especially in patients with BMI greater than or equal to 35 kg/m2 with multiple complications. Surgical treatments include lap-band, gastric sleeve or gastric bypass surgery.    I informed the pt that:  1.Weight loss medications can be taken to assist with weight reduction when combined with appropriate healthy lifestyle changes.  2.I discussed possible s/e, risks and benefits of weight loss medications.  3.All medications are FDA approved, however, some may be used ''off label'' for their weight loss benefits and some ''short term'' medications can be used for longer periods to achieve desired clinical outcomes.  4.All patients taking weight loss medications must be seen in clinic for refill authorization.    Counseling exercise ( V65.41)  Dietary counseling( V65.3)  Calculated BMI above the upper parameter and f/u plan was documented in the medical record.  Discussed indications, risks and benefits of all medications prescribed, and answered questions to patient's satisfaction.  Start Contrave, titrate dose as directed.    2.  Sleep Apnea.  Recently diagnosed.  On Cpap.    3.  Prediabetes.  Currently treated with Metformin 500 mg bid.  Continue.    History of binge eating disorder. States this is not currently controlled.  Previously in the [employer] program but the program hours did not  work out with her work schedule.  Recommend she look into the [facility] program - depending on her insurance coverage.  I think it is preferable she work with an eating disorder program but alternately could start by seeing a counselor.    Labs ordered today:   No orders of the defined types were placed in this encounter.    Radiology/Consults ordered today: None    More than 50% of the time spent with Ms. Cantu on counseling / coordinating her care.  Total face to face time was 25 minutes.      Follow-up:  1-3 month after starting contrave    Darya Fuentes NP  [facility]  CC: Joe Ramirez   ____________________________________________________________

## 2017-11-06 NOTE — MR AVS SNAPSHOT
After Visit Summary   11/6/2017    Zenobia Cantu    MRN: [id number]           Patient Information     Date Of Birth          1990        Visit Information        Provider Department      11/6/2017 3:30 PM Darya Fuentes APRN CNP [facility]        Today's Diagnoses     Morbid obesity (H)    -  1    BMI 50.0-59.9, adult (H)        Essential hypertension        Prediabetes          Care Instructions        Start Contrave and titrate:  Week 1: one table daily with food - breakfast.  Week 2: one tablet with breakfast, one tablet with dinner  Week 3: two tablets with breakfast, one tablet with dinner  Week 4 and thereafter: two tablets twice daily.    You can stay at one of the lower doses if needed due to side.    Follow up with me in 1-3 months depending on how you are doing with the medication.    Tomorrow program.  There are multiple handouts with dieting recommendations.    Darya Fuentes NP  Endocrinology            Follow-ups after your visit        Your next 10 appointments already scheduled     Nov 14, 2017  4:30 PM CST   Return Sleep Patient with Kenney Hector MD   [facility] ([facility])    [address]  [place] [address] [phone]            Dec 04, 2017  3:20 PM CST   Office Visit with Joe Ramirez PA-C   [facility]    [address]  [place] [address] [phone]           Bring a current list of meds and any records pertaining to this visit. For Physicals, please bring immunization records and any forms needing to be filled out. Please arrive 10 minutes early to complete paperwork.              Who to contact     If you have questions or need follow up information about today's clinic visit or your schedule please contact [facility] directly at [phone].  Normal or non-critical  lab and imaging results will be communicated to you by E-Generatorhart, letter or phone within 4 business days after the clinic has received the results. If you do not hear from us within 7 days, please contact the clinic through Misocat or phone. If you have a critical or abnormal lab result, we will notify you by phone as soon as possible.  Submit refill requests through [employer] or call your pharmacy and they will forward the refill request to us. Please allow 3 business days for your refill to be completed.          Additional Information About Your Visit        E-GeneratorharFiNC Information     [employer] gives you secure access to your electronic health record. If you see a primary care provider, you can also send messages to your care team and make appointments. If you have questions, please call your primary care clinic.  If you do not have a primary care provider, please call [phone] and they will assist you.        Care EveryWhere ID     This is your Care EveryWhere ID. This could be used by other organizations to access your [place] medical records  [id number]        Your Vitals Were     Pulse Temperature Respirations BMI (Body Mass Index)          67 98.5  F (36.9  C) (Oral) 16 50.37 kg/m2         Blood Pressure from Last 3 Encounters:   11/06/17 142/76   10/03/17 160/84   09/29/17 154/88    Weight from Last 3 Encounters:   11/06/17 (!) 387 lb (175.5 kg)   10/03/17 (!) 379 lb (171.9 kg)   09/29/17 (!) 380 lb (172.4 kg)              Today, you had the following     No orders found for display         Today's Medication Changes          These changes are accurate as of: 11/6/17  3:55 PM.  If you have any questions, ask your nurse or doctor.               Start taking these medicines.        Dose/Directions    naltrexone-bupropion 8-90 MG per 12 hr tablet   Commonly known as:  CONTRAVE   Used for:  Morbid obesity (H), BMI 50.0-59.9, adult (H), Essential hypertension, Prediabetes   Started by:  Darya Fuentes APRN  CNP        Dose:  2 tablet   Take 2 tablets by mouth 2 times daily   Quantity:  120 tablet   Refills:  2            Where to get your medicines      These medications were sent to [facility] - [place], [place] - [address]  [place] [facility] [address] [facility] [address]     Phone:  [phone]     naltrexone-bupropion 8-90 MG per 12 hr tablet                Primary Care Provider Office Phone # Fax #    Joe Ramirez PA-C [phone]       [address]  [facility] [address]        Equal Access to Services     [facility] Hospital[facility] : Hadii aad ku hadasho Soomaali, waaxda luqadaha, qaybta kaalmada adeegyada, waxdavid mcelroy hayamien adeeda kerr . So [facility] [phone].    ATENCIÓN: Si habla español, tiene a prescott disposición servicios gratuitos de asistencia lingüística. [facility] [phone].    We comply with applicable federal civil rights laws and [place] laws. We do not discriminate on the basis of race, color, national origin, age, disability, sex, sexual orientation, or gender identity.            Thank you!     Thank you for choosing [facility]  for your care. Our goal is always to provide you with excellent care. Hearing back from our patients is one way we can continue to improve our services. Please take a few minutes to complete the written survey that you may receive in the mail after your visit with us. Thank you!             Your Updated Medication List - Protect others around you: Learn how to safely use, store and throw away your medicines at www.disposemymeds.org.          This list is accurate as of: 11/6/17  3:55 PM.  Always use your most recent med list.                   Brand Name Dispense Instructions for use Diagnosis    amLODIPine 10 MG tablet    NORVASC    90 tablet    Take 1 tablet (10 mg) by mouth daily    Essential hypertension       FLUoxetine 40 MG capsule    PROzac     TK 1 C PO D        metFORMIN 500 MG 24 hr tablet     GLUCOPHAGE-XR    60 tablet    Take 1 tablet (500 mg) by mouth 2 times daily (with meals)    IFG (impaired fasting glucose)       naltrexone-bupropion 8-90 MG per 12 hr tablet    CONTRAVE    120 tablet    Take 2 tablets by mouth 2 times daily    Morbid obesity (H), BMI 50.0-59.9, adult (H), Essential hypertension, Prediabetes       norgestim-eth estrad triphasic 0.18/0.215/0.25 MG-25 MCG per tablet    ORTHO TRI-CYCLEN LO    84 tablet    Take 1 tablet by mouth daily    Encounter for surveillance of contraceptive pills       propranolol 160 MG 24 hr capsule    INDERAL LA    30 capsule    Take 1 capsule (160 mg) by mouth daily    Essential hypertension

## 2017-11-07 ENCOUNTER — MYC MEDICAL ADVICE (OUTPATIENT)
Dept: ENDOCRINOLOGY | Facility: CLINIC | Age: 27
End: 2017-11-07

## 2017-11-07 ENCOUNTER — MYC MEDICAL ADVICE (OUTPATIENT)
Dept: SLEEP MEDICINE | Facility: CLINIC | Age: 27
End: 2017-11-07

## 2017-11-07 ENCOUNTER — TELEPHONE (OUTPATIENT)
Dept: ENDOCRINOLOGY | Facility: CLINIC | Age: 27
End: 2017-11-07

## 2017-11-07 DIAGNOSIS — F33.0 MAJOR DEPRESSIVE DISORDER, RECURRENT EPISODE, MILD (H): Primary | ICD-10-CM

## 2017-11-07 DIAGNOSIS — E66.01 MORBID OBESITY (H): ICD-10-CM

## 2017-11-07 DIAGNOSIS — F41.9 ANXIETY: ICD-10-CM

## 2017-11-07 NOTE — TELEPHONE ENCOUNTER
Received fax from pharm that states RX for Contrave tabs is not covered under patient's insurance, phone# [phone] and ID# [id number]----Skwuf Smith do you want to initiate PA or change RX?  Please send response back to PA pool at P [phone].  Please give justification if requesting PA!      Thanks    Shari Schoenberger, CMA

## 2017-11-08 NOTE — TELEPHONE ENCOUNTER
SUBJECTIVE:  See mychart message    OBJECTIVE:  Pt is meeting all objective benchmarks.     ASSESSMENT/PLAN:  Waiting on return call from patient regarding issues of discomfort from mask.

## 2017-11-09 ENCOUNTER — MYC MEDICAL ADVICE (OUTPATIENT)
Dept: ENDOCRINOLOGY | Facility: CLINIC | Age: 27
End: 2017-11-09

## 2017-11-09 NOTE — TELEPHONE ENCOUNTER
Morbid Obesity, BMI > 50 with comorbidities HTN, KENY, and IFG/prediabetes.  Failed therapy with diet and exercise > 6 months.  Phentermine contraindicated due to history of anxiety and depression.  Darya Fuentes NP  Endocrinology

## 2017-11-13 NOTE — TELEPHONE ENCOUNTER
"Called [facility] to see if we could do a PA over the phone but was told \"no\".  A form is being faxed Katina/Niru.  Shari Schoenberger, [facility]    "

## 2017-11-14 RX ORDER — BUPROPION HYDROCHLORIDE 150 MG/1
150 TABLET, EXTENDED RELEASE ORAL 2 TIMES DAILY
Qty: 60 TABLET | Refills: 2 | Status: SHIPPED | OUTPATIENT
Start: 2017-11-14 | End: 2018-04-26 | Stop reason: SINTOL

## 2017-11-14 NOTE — TELEPHONE ENCOUNTER
Patient returned the call:    1.  How long was pt on Weight Watchers? Patient tried the program for 6 months at least 2 years ago.     2.  How long has pt struggled with weight ( Months or  Years) ? Patient has struggled with weight since 2010.    Kristen Duncan

## 2017-11-14 NOTE — TELEPHONE ENCOUNTER
Deja from [employer] called back to let us know that the Contrave is not covered under this patients insurance plan for any reason.   Deja's direct Ph# [phone].   Darya Fuentes, please advise.  Niru Blue M.A.

## 2017-11-14 NOTE — TELEPHONE ENCOUNTER
Form completed.  I have attempted to fax it several times and keep getting a busy signal.  Will try again later this afternoon. Shari Schoenberger, CMA

## 2017-11-14 NOTE — TELEPHONE ENCOUNTER
Medication Request Form received from [facility].  I have almost all of the form filled out but there are 2 questions that I do not have the answers to. LMOM for pt to cb to [facility]. Need answers to the following ?s for form completion:    1.  How long was pt on Weight Watchers?    2.  How long has pt struggled with weight ( Months or  Years) ?    Shari Schoenberger, CMA        Form will be in Darya's folder at [facility].

## 2017-11-14 NOTE — TELEPHONE ENCOUNTER
Please call and let her know contrave is not covered so I will prescribe the two components in contrave separately.  She should start wellbutrin 150 mg once daily x 3 days then increase to twice daily.  Have her check back in with me in 2 weeks to let me know how she's doing on the Wellbutrin and we can add the naltrexone at that time if she is tolerating the wellbutrin ok.  Darya Fuentes NP  Endocrinology

## 2017-11-29 ENCOUNTER — MYC MEDICAL ADVICE (OUTPATIENT)
Dept: ENDOCRINOLOGY | Facility: CLINIC | Age: 27
End: 2017-11-29

## 2017-11-29 DIAGNOSIS — R73.03 PREDIABETES: ICD-10-CM

## 2017-11-29 DIAGNOSIS — E66.01 MORBID OBESITY (H): Primary | ICD-10-CM

## 2017-11-29 RX ORDER — NALTREXONE HYDROCHLORIDE 50 MG/1
TABLET, FILM COATED ORAL
Qty: 30 TABLET | Refills: 2 | Status: SHIPPED | OUTPATIENT
Start: 2017-11-29 | End: 2018-03-26

## 2017-12-01 ENCOUNTER — TELEPHONE (OUTPATIENT)
Dept: FAMILY MEDICINE | Facility: CLINIC | Age: 27
End: 2017-12-01

## 2017-12-01 NOTE — TELEPHONE ENCOUNTER
Panel Management Review      Patient has the following on her problem list:     Depression / Dysthymia review    Measure:  Needs PHQ-9 score of 4 or less during index window.  Administer PHQ-9 and if score is 5 or more, send encounter to provider for next steps.    5 - 7 month window range:     PHQ-9 SCORE 7/25/2017 7/31/2017   Total Score 2 3       If PHQ-9 recheck is 5 or more, route to provider for next steps.    Patient is due for:  None    Hypertension   Last three blood pressure readings:  BP Readings from Last 3 Encounters:   11/06/17 142/76   10/03/17 160/84   09/29/17 154/88     Blood pressure: FAILED    HTN Guidelines:  Age 18-59 BP range:  Less than 140/90  Age 60-85 with Diabetes:  Less than 140/90  Age 60-85 without Diabetes:  less than 150/90          Composite cancer screening  Chart review shows that this patient is due/due soon for the following None  Summary:    Patient is due/failing the following:   BP CHECK    Action needed:   Patient needs nurse only appointment.    Type of outreach:    NONE, patient has appointment scheduled for 12/4/17 with PCP for Hypertension follow up.    Questions for provider review:    None                                                                                                                                    Olivia Kelly MA     Encounter closed.

## 2017-12-04 ENCOUNTER — OFFICE VISIT (OUTPATIENT)
Dept: FAMILY MEDICINE | Facility: CLINIC | Age: 27
End: 2017-12-04
Payer: COMMERCIAL

## 2017-12-04 VITALS
SYSTOLIC BLOOD PRESSURE: 182 MMHG | DIASTOLIC BLOOD PRESSURE: 58 MMHG | RESPIRATION RATE: 20 BRPM | HEART RATE: 78 BPM | TEMPERATURE: 98.6 F | BODY MASS INDEX: 50.24 KG/M2 | WEIGHT: 293 LBS

## 2017-12-04 DIAGNOSIS — I10 ESSENTIAL HYPERTENSION: Primary | ICD-10-CM

## 2017-12-04 PROCEDURE — 99213 OFFICE O/P EST LOW 20 MIN: CPT | Performed by: PHYSICIAN ASSISTANT

## 2017-12-04 PROCEDURE — 36415 COLL VENOUS BLD VENIPUNCTURE: CPT | Performed by: PHYSICIAN ASSISTANT

## 2017-12-04 PROCEDURE — 82088 ASSAY OF ALDOSTERONE: CPT | Mod: 90 | Performed by: PHYSICIAN ASSISTANT

## 2017-12-04 PROCEDURE — 80048 BASIC METABOLIC PNL TOTAL CA: CPT | Performed by: PHYSICIAN ASSISTANT

## 2017-12-04 PROCEDURE — 99000 SPECIMEN HANDLING OFFICE-LAB: CPT | Performed by: PHYSICIAN ASSISTANT

## 2017-12-04 RX ORDER — HYDROCHLOROTHIAZIDE 12.5 MG/1
12.5 TABLET ORAL DAILY
Qty: 30 TABLET | Refills: 1 | Status: SHIPPED | OUTPATIENT
Start: 2017-12-04 | End: 2018-03-19

## 2017-12-04 NOTE — PROGRESS NOTES
SUBJECTIVE:                                                    Zenobia Cantu is a 27 year old female who presents to clinic today for the following health issues:      Hypertension     Hypertension:     Outpatient blood pressures:  Are being checked    [facility]    Dietary sodium intake::  Low salt diet  History of Present Illness     Hypertension:     Outpatient blood pressures:  Are being checked    Blood pressures checked at[facility]    Dietary sodium intake::  Low salt diet      BP Readings from Last 6 Encounters:   12/04/17 182/58   11/06/17 142/76   10/03/17 160/84   09/29/17 154/88   09/26/17 148/88   09/13/17 144/78     Patient here for blood pressure follow up.  Last month her BP was close to being controlled and she was propranolol 160mg and Norvasc 10mg.  Today it is quite elevated again.  When she first came to our clinic it was up in this very elevated range. (196/96).  She previously had no known history of HTN though has a FH of it.  She is currently seeing Endocrine for treatment of obesity and was placed on Wellbutrin and Naltrexone within the last couple weeks.  She also takes Metformin for weight loss.    She is on a combination OCP that we switched to 20mcg estrogen last summer.  Her kidney function labs have been normal.  BP is proving to be difficult to control.      Problem list and histories reviewed & adjusted, as indicated.  Additional history: as documented      ROS:  Constitutional, HEENT, cardiovascular, pulmonary, gi and gu systems are negative, except as otherwise noted.      OBJECTIVE:   /58 (BP Location: Right arm, Patient Position: Sitting, Cuff Size: Adult Large)  Pulse 78  Temp 98.6  F (37  C) (Oral)  Resp 20  Wt (!) 386 lb (175.1 kg)  BMI 50.24 kg/m2  Body mass index is 50.24 kg/(m^2).  GENERAL: healthy, alert and no distress  NECK: no adenopathy, no asymmetry, masses, or scars and thyroid normal to palpation  CV:  regular rate and rhythm, normal S1 S2, no S3 or S4, no murmur, click or rub, no peripheral edema and peripheral pulses strong  MS: no gross musculoskeletal defects noted, no edema  SKIN: no suspicious lesions or rashes  PSYCH: mentation appears normal, affect normal/bright    Diagnostic Test Results:  Results for orders placed or performed in visit on 12/04/17   Aldosterone   Result Value Ref Range    Aldosterone 8.1 ng/dL   Basic metabolic panel   Result Value Ref Range    Sodium 139 133 - 144 mmol/L    Potassium 4.2 3.4 - 5.3 mmol/L    Chloride 105 94 - 109 mmol/L    Carbon Dioxide 24 20 - 32 mmol/L    Anion Gap 10 3 - 14 mmol/L    Glucose 131 (H) 70 - 99 mg/dL    Urea Nitrogen 8 7 - 30 mg/dL    Creatinine 0.75 0.52 - 1.04 mg/dL    GFR Estimate >90 >60 mL/min/1.7m2    GFR Estimate If Black >90 >60 mL/min/1.7m2    Calcium 8.9 8.5 - 10.1 mg/dL       ASSESSMENT/PLAN:   1. Essential hypertension  Most labs for her have been normal.  She is on two medications at this point and will add a third before considering referral to nephrology or cardiology.  BP is proving very difficult to control still.  Follow up for BP check in one week.  - hydrochlorothiazide 12.5 MG TABS tablet; Take 1 tablet (12.5 mg) by mouth daily  Dispense: 30 tablet; Refill: 1  - Aldosterone  - Basic metabolic panel        Joe Ramirez PA-C  [facility]

## 2017-12-04 NOTE — MR AVS SNAPSHOT
After Visit Summary   12/4/2017    Zenobia Cantu    MRN: [id number]           Patient Information     Date Of Birth          1990        Visit Information        Provider Department      12/4/2017 3:20 PM Joe Ramirez PA-C [facility]        Today's Diagnoses     Essential hypertension    -  1       Follow-ups after your visit        Follow-up notes from your care team     Return in about 1 week (around 12/11/2017) for BP Recheck.      Your next 10 appointments already scheduled     Dec 08, 2017  4:30 PM CST   [facility] Sleep Medicine Return with Kenney Hector MD   [facility]    [address]  [place] [address] [phone]              Who to contact     If you have questions or need follow up information about today's clinic visit or your schedule please contact [facility] directly at [phone].  Normal or non-critical lab and imaging results will be communicated to you by RentJiffyhart, letter or phone within 4 business days after the clinic has received the results. If you do not hear from us within 7 days, please contact the clinic through Appolicioust or phone. If you have a critical or abnormal lab result, we will notify you by phone as soon as possible.  Submit refill requests through Metaconomy or call your pharmacy and they will forward the refill request to us. Please allow 3 business days for your refill to be completed.          Additional Information About Your Visit        RentJiffyhart Information     [employer] gives you secure access to your electronic health record. If you see a primary care provider, you can also send messages to your care team and make appointments. If you have questions, please call your primary care clinic.  If you do not have a primary care provider, please call [phone] and they will assist you.        Care EveryWhere ID     This is your Care  EveryWhere ID. This could be used by other organizations to access your [place] medical records  [id number]        Your Vitals Were     Pulse Temperature Respirations BMI (Body Mass Index)          78 98.6  F (37  C) (Oral) 20 50.24 kg/m2         Blood Pressure from Last 3 Encounters:   12/04/17 182/58   11/06/17 142/76   10/03/17 160/84    Weight from Last 3 Encounters:   12/04/17 (!) 386 lb (175.1 kg)   11/06/17 (!) 387 lb (175.5 kg)   10/03/17 (!) 379 lb (171.9 kg)              We Performed the Following     Aldosterone     Basic metabolic panel          Today's Medication Changes          These changes are accurate as of: 12/4/17  3:49 PM.  If you have any questions, ask your nurse or doctor.               Start taking these medicines.        Dose/Directions    hydrochlorothiazide 12.5 MG Tabs tablet   Used for:  Essential hypertension   Started by:  Joe Ramirez PA-C        Dose:  12.5 mg   Take 1 tablet (12.5 mg) by mouth daily   Quantity:  30 tablet   Refills:  1            Where to get your medicines      These medications were sent to [facility] [id number] [place] [address] [address] [facility] AT [facility] [address]  [address] [facility] [address]     Phone:  [phone]     hydrochlorothiazide 12.5 MG Tabs tablet                Primary Care Provider Office Phone # Fax #    Joe Ramirez PA-C [phone]       [address] [facility] [address]        Equal Access to Services     [facility] HospitalSHREE AH: Hadii valerio churchillo Somiguelito, waaxda luqadaha, qaybta kaalmada ademeeta, waxay idilola lemos. So [facility] [phone].    ATENCIÓN: Si habla español, tiene a prescott disposición servicios gratuitos de asistencia lingüística. Llame al [phone].    We comply with applicable federal civil rights laws and [place] laws. We do not discriminate on the basis of race, color, national origin, age, disability, sex, sexual orientation, or gender  identity.            Thank you!     Thank you for choosing [facility]  for your care. Our goal is always to provide you with excellent care. Hearing back from our patients is one way we can continue to improve our services. Please take a few minutes to complete the written survey that you may receive in the mail after your visit with us. Thank you!             Your Updated Medication List - Protect others around you: Learn how to safely use, store and throw away your medicines at www.disposemymeds.org.          This list is accurate as of: 12/4/17  3:49 PM.  Always use your most recent med list.                   Brand Name Dispense Instructions for use Diagnosis    amLODIPine 10 MG tablet    NORVASC    90 tablet    Take 1 tablet (10 mg) by mouth daily    Essential hypertension       buPROPion 150 MG 12 hr tablet    WELLBUTRIN SR    60 tablet    Take 1 tablet (150 mg) by mouth 2 times daily    Major depressive disorder, recurrent episode, mild (H), Anxiety, Morbid obesity (H), BMI 50.0-59.9, adult (H)       FLUoxetine 40 MG capsule    PROzac     TK 1 C PO D        hydrochlorothiazide 12.5 MG Tabs tablet     30 tablet    Take 1 tablet (12.5 mg) by mouth daily    Essential hypertension       metFORMIN 500 MG 24 hr tablet    GLUCOPHAGE-XR    60 tablet    Take 1 tablet (500 mg) by mouth 2 times daily (with meals)    IFG (impaired fasting glucose)       naltrexone 50 MG tablet    DEPADE;REVIA    30 tablet    Take with food. Titrate as follows: Week one: 1/4 tablet with breakfast Week two: 1/4 tablet with breakfast and dinner Week three: 1/2 tablet with breakfast, 1/4 tablet with dinner. Week four and thereafter: 1/2 tablet twice daily.    Morbid obesity (H), BMI 50.0-59.9, adult (H), Prediabetes       norgestim-eth estrad triphasic 0.18/0.215/0.25 MG-25 MCG per tablet    ORTHO TRI-CYCLEN LO    84 tablet    Take 1 tablet by mouth daily    Encounter for surveillance of contraceptive pills       propranolol  160 MG 24 hr capsule    INDERAL LA    30 capsule    Take 1 capsule (160 mg) by mouth daily    Essential hypertension

## 2017-12-06 LAB
ALDOST SERPL-MCNC: 8.1 NG/DL
ANION GAP SERPL CALCULATED.3IONS-SCNC: 10 MMOL/L (ref 3–14)
BUN SERPL-MCNC: 8 MG/DL (ref 7–30)
CALCIUM SERPL-MCNC: 8.9 MG/DL (ref 8.5–10.1)
CHLORIDE SERPL-SCNC: 105 MMOL/L (ref 94–109)
CO2 SERPL-SCNC: 24 MMOL/L (ref 20–32)
CREAT SERPL-MCNC: 0.75 MG/DL (ref 0.52–1.04)
GFR SERPL CREATININE-BSD FRML MDRD: >90 ML/MIN/1.7M2
GLUCOSE SERPL-MCNC: 131 MG/DL (ref 70–99)
POTASSIUM SERPL-SCNC: 4.2 MMOL/L (ref 3.4–5.3)
SODIUM SERPL-SCNC: 139 MMOL/L (ref 133–144)

## 2017-12-07 ENCOUNTER — OFFICE VISIT (OUTPATIENT)
Dept: FAMILY MEDICINE | Facility: CLINIC | Age: 27
End: 2017-12-07
Payer: COMMERCIAL

## 2017-12-07 VITALS
BODY MASS INDEX: 49.98 KG/M2 | DIASTOLIC BLOOD PRESSURE: 60 MMHG | WEIGHT: 293 LBS | SYSTOLIC BLOOD PRESSURE: 150 MMHG | TEMPERATURE: 98.6 F | HEART RATE: 72 BPM | RESPIRATION RATE: 20 BRPM

## 2017-12-07 DIAGNOSIS — L05.01 PILONIDAL CYST WITH ABSCESS: Primary | ICD-10-CM

## 2017-12-07 DIAGNOSIS — I10 ESSENTIAL HYPERTENSION: ICD-10-CM

## 2017-12-07 PROCEDURE — 99214 OFFICE O/P EST MOD 30 MIN: CPT | Performed by: PHYSICIAN ASSISTANT

## 2017-12-07 RX ORDER — SULFAMETHOXAZOLE/TRIMETHOPRIM 800-160 MG
1 TABLET ORAL 2 TIMES DAILY
Qty: 20 TABLET | Refills: 0 | Status: SHIPPED | OUTPATIENT
Start: 2017-12-07 | End: 2018-03-19

## 2017-12-07 NOTE — PROGRESS NOTES
SUBJECTIVE:   Zenobia Cantu is a 27 year old female who presents to clinic today for the following health issues:      MASS      Duration: last week    Description (location/character/radiation): right side tail bone area    Intensity:  mild    Accompanying signs and symptoms: thinks it may have drained, still draining    History (similar episodes/previous evaluation): 6/2015    Precipitating or alleviating factors: None    Therapies tried and outcome: None     Zenobia is here for evaluation of a painful bump on her tail bone.  She has had faint tingling and aching in the area for about a week but two days ago developed pain and swelling.  Just this AM she believes it may have ruptured and is now draining.  She has had something like this a couple years ago as well.    BP, of note, is lower today.  Has been on HCTZ for a little over a week now.    Problem list and histories reviewed & adjusted, as indicated.  Additional history: as documented      Reviewed and updated as needed this visit by clinical staffTobacco  Allergies  Meds  Problems  Med Hx  Surg Hx  Fam Hx  Soc Hx        Reviewed and updated as needed this visit by Provider         ROS:  Constitutional, HEENT, cardiovascular, pulmonary, gi and gu systems are negative, except as otherwise noted.      OBJECTIVE:   /60 (BP Location: Right arm, Patient Position: Sitting, Cuff Size: Adult Large)  Pulse 72  Temp 98.6  F (37  C) (Oral)  Resp 20  Wt (!) 384 lb (174.2 kg)  BMI 49.98 kg/m2  Body mass index is 49.98 kg/(m^2).  GENERAL: healthy, alert and no distress  MS: no gross musculoskeletal defects noted, no edema  SKIN: top right gluteal cleft there is a pilonidal cyst draining a small amount of purulent material, mild erythema present with mild tenderness  PSYCH: mentation appears normal, affect normal/bright    Diagnostic Test Results:  none     ASSESSMENT/PLAN:   1. Pilonidal cyst with abscess  Follow up if not improving.  -  sulfamethoxazole-trimethoprim (BACTRIM DS/SEPTRA DS) 800-160 MG per tablet; Take 1 tablet by mouth 2 times daily  Dispense: 20 tablet; Refill: 0    2. Essential hypertension  - continue on HCTZ and she has a nurse BP check also set up.        Joe Ramirez PA-C  [facility]

## 2017-12-07 NOTE — MR AVS SNAPSHOT
After Visit Summary   12/7/2017    Zenobia Cantu    MRN: [id number]           Patient Information     Date Of Birth          1990        Visit Information        Provider Department      12/7/2017 3:20 PM Joe Ramirez PA-C [facility]        Today's Diagnoses     Pilonidal cyst with abscess    -  1    Essential hypertension           Follow-ups after your visit        Follow-up notes from your care team     Return if symptoms worsen or fail to improve.      Your next 10 appointments already scheduled     Dec 08, 2017  4:30 PM CST   [facility] with Kenney Hector [facility]   [facility] [place] [place]    [address]  [facility] [id number] [phone]            Dec 13, 2017  3:30 PM CST   Nurse Only with FM NURSE   [facility]    [address], [address]  [facility] [address] [phone]              Who to contact     If you have questions or need follow up information about today's clinic visit or your schedule please contact [facility] directly at [phone].  Normal or non-critical lab and imaging results will be communicated to you by MyChart, letter or phone within 4 business days after the clinic has received the results. If you do not hear from us within 7 days, please contact the clinic through [facility] or phone. If you have a critical or abnormal lab result, we will notify you by phone as soon as possible.  Submit refill requests through [employer] or call your pharmacy and they will forward the refill request to us. Please allow 3 business days for your refill to be completed.          Additional Information About Your Visit        MyChart Information     [employer] [facility] gives you secure access to your electronic health record. If you see a primary care provider, you can also send messages to your care team and  make appointments. If you have questions, please call your primary care clinic.  If you do not have a primary care provider, please call [phone] and they will assist you.        Care EveryWhere ID     This is your Care EveryWhere ID. This could be used by other organizations to access your [place] medical records  [id number]        Your Vitals Were     Pulse Temperature Respirations BMI (Body Mass Index)          72 98.6  F (37  C) (Oral) 20 49.98 kg/m2         Blood Pressure from Last 3 Encounters:   12/07/17 150/60   12/04/17 182/58   11/06/17 142/76    Weight from Last 3 Encounters:   12/07/17 (!) 384 lb (174.2 kg)   12/04/17 (!) 386 lb (175.1 kg)   11/06/17 (!) 387 lb (175.5 kg)              Today, you had the following     No orders found for display         Today's Medication Changes          These changes are accurate as of: 12/7/17  3:49 PM.  If you have any questions, ask your nurse or doctor.               Start taking these medicines.        Dose/Directions    sulfamethoxazole-trimethoprim 800-160 MG per tablet   Commonly known as:  BACTRIM DS/SEPTRA DS   Used for:  Pilonidal cyst with abscess   Started by:  Joe Ramirez PA-C        Dose:  1 tablet   Take 1 tablet by mouth 2 times daily   Quantity:  20 tablet   Refills:  0            Where to get your medicines      These medications were sent to [facility] [address] [place] [address] [address] [facility] [address]  [address] [facility] [address]     Phone:  [phone]     sulfamethoxazole-trimethoprim 800-160 MG per tablet                Primary Care Provider Office Phone # Fax #    Joe Ramirez PA-C [phone]  [facility] [address]        Equal Access to Services     DAVY RAMIREZ AH: Charli mcfarland Somiguelito, waaxda luqadaha, qaybta kaalmada adeegyarajiv, ronel lemos. So [facility] [phone].    ATENCIÓN: Si yon coe prescott  disposición servicios gratuitos de asistencia lingüística. Marcio coleman [phone].    We comply with applicable federal civil rights laws and [place] laws. We do not discriminate on the basis of race, color, national origin, age, disability, sex, sexual orientation, or gender identity.            Thank you!     Thank you for choosing [facility]  for your care. Our goal is always to provide you with excellent care. Hearing back from our patients is one way we can continue to improve our services. Please take a few minutes to complete the written survey that you may receive in the mail after your visit with us. Thank you!             Your Updated Medication List - Protect others around you: Learn how to safely use, store and throw away your medicines at www.disposemymeds.org.          This list is accurate as of: 12/7/17  3:49 PM.  Always use your most recent med list.                   Brand Name Dispense Instructions for use Diagnosis    amLODIPine 10 MG tablet    NORVASC    90 tablet    Take 1 tablet (10 mg) by mouth daily    Essential hypertension       buPROPion 150 MG 12 hr tablet    WELLBUTRIN SR    60 tablet    Take 1 tablet (150 mg) by mouth 2 times daily    Major depressive disorder, recurrent episode, mild (H), Anxiety, Morbid obesity (H), BMI 50.0-59.9, adult (H)       FLUoxetine 40 MG capsule    PROzac     TK 1 C PO D        hydrochlorothiazide 12.5 MG Tabs tablet     30 tablet    Take 1 tablet (12.5 mg) by mouth daily    Essential hypertension       metFORMIN 500 MG 24 hr tablet    GLUCOPHAGE-XR    60 tablet    Take 1 tablet (500 mg) by mouth 2 times daily (with meals)    IFG (impaired fasting glucose)       naltrexone 50 MG tablet    DEPADE;REVIA    30 tablet    Take with food. Titrate as follows: Week one: 1/4 tablet with breakfast Week two: 1/4 tablet with breakfast and dinner Week three: 1/2 tablet with breakfast, 1/4 tablet with dinner. Week four and thereafter: 1/2 tablet twice  daily.    Morbid obesity (H), BMI 50.0-59.9, adult (H), Prediabetes       norgestim-eth estrad triphasic 0.18/0.215/0.25 MG-25 MCG per tablet    ORTHO TRI-CYCLEN LO    84 tablet    Take 1 tablet by mouth daily    Encounter for surveillance of contraceptive pills       propranolol 160 MG 24 hr capsule    INDERAL LA    30 capsule    Take 1 capsule (160 mg) by mouth daily    Essential hypertension       sulfamethoxazole-trimethoprim 800-160 MG per tablet    BACTRIM DS/SEPTRA DS    20 tablet    Take 1 tablet by mouth 2 times daily    Pilonidal cyst with abscess

## 2017-12-08 ENCOUNTER — OFFICE VISIT (OUTPATIENT)
Dept: SLEEP MEDICINE | Facility: CLINIC | Age: 27
End: 2017-12-08
Payer: COMMERCIAL

## 2017-12-08 VITALS
RESPIRATION RATE: 18 BRPM | HEIGHT: 72 IN | WEIGHT: 293 LBS | DIASTOLIC BLOOD PRESSURE: 85 MMHG | BODY MASS INDEX: 39.68 KG/M2 | SYSTOLIC BLOOD PRESSURE: 139 MMHG | OXYGEN SATURATION: 97 % | HEART RATE: 69 BPM

## 2017-12-08 DIAGNOSIS — I10 ESSENTIAL HYPERTENSION: ICD-10-CM

## 2017-12-08 DIAGNOSIS — E66.01 MORBID OBESITY (H): ICD-10-CM

## 2017-12-08 DIAGNOSIS — G47.33 OSA (OBSTRUCTIVE SLEEP APNEA): Primary | ICD-10-CM

## 2017-12-08 DIAGNOSIS — G47.19 EXCESSIVE DAYTIME SLEEPINESS: ICD-10-CM

## 2017-12-08 DIAGNOSIS — R06.83 SNORING: ICD-10-CM

## 2017-12-08 PROCEDURE — 99214 OFFICE O/P EST MOD 30 MIN: CPT | Performed by: FAMILY MEDICINE

## 2017-12-08 NOTE — PROGRESS NOTES
Sleep [facility]  Outpatient Sleep Medicine Follow Up Visit  December 8, 2017     Name: Zenobia Cantu MRN# [id number]   Age: 27 year old YOB: 1990      Date of Follow Up Visit: December 8, 2017  Consultation is requested by: LENORA Javier CNP  [address] [place] [address]  Primary care provider: Joe Ramirez     Patient is accompanied by: Patient presents with , Ish, today.        Reason for Sleep Consult:      Zenobia Cantu is a 27 year old female patient that presents here for an KENY follow up evaluation.          Assessment and Plan:      Summary Sleep Diagnoses:     (1) Moderate KENY (AHI of 19.0 events per hour)  (2) Snoring  (3) Morbid Obesity  (4) HTN  (5) EDS     Summary Recommendations:     This patient had sxs, hx, and physical findings that suggested the diagnosis of a sleep disordered breathing. In addition, she had a high pre-test probability of KENY. Given this patient's body habitus, she underwent an in-lab split-night PSG sleep study with TCM. Although the patient reported some morning cephalgia, these headache episodes were not consistent with obesity hypoventilation syndrome. The PSG sleep study demonstrated moderate KENY with no sleep associated hypoxemia / hypoventilation (overall AHI was 19.0 events per hour). During the last visit, after much discussion, the patient opted to start PAP therapy and, as such, she was placed on an APAP with minimum pressure of 6 cmH2O and maximum pressure of 16 cmH2O. The PAP download shows a compliant patient. The device is subjective and objectively treating the condition with a residual AHI of 0.9 events per hour. At this point, the PAP pressures do not need to be modified. As such, the current treatment with an APAP at 6 - 16 cmH2O will be continued. PAP compliance was discussed and explained. Lifestyle recommendations including healthy dietary and exercising habits were discussed (the patient is already  actively trying to lose weight). Pt will follow up with me within one year. Lastly, this patient will follow up with her PCP regarding the elevated BP noted today (education and counseling given).    This patient's evaluation showed elevated BP today. Education and counseling regarding elevated BP were given. The effects of elevated BP were discussed. The patient was advised to monitor his BP and keep a BP diary / log. The patient will follow up with her PCP for further management.    Coding:  (G47.33) KENY (obstructive sleep apnea)  (primary encounter diagnosis)  (E66.01) Morbid obesity due to excess calories (H)  (I10) Essential hypertension  (R06.83) Snoring  (G47.19) Excessive daytime sleepiness     Counseling included a comprehensive review of diagnostic and therapeutic strategies as well as risks of inadequate therapy.     Educational materials provided in instructions. The patient was instructed to avoid driving or operating any heavy machinery when experiencing drowsiness.     All questions and concerns were addressed today. Pt agrees and understands the assessment and plan.          History of Present Illness:      Zenobia Cantu is a 27 year old  RHD female pt with PMH of MDD, RUBEN, HTN, and morbid obesity presents for an KENY follow up evaluation today after completing a PSG sleep study due to snoring. During the initial visit, the patient explained that she was recently at the endocrinologist due to difficult trying to lose weight.     During the initial visit, the patient reported loud snoring (pt can be heard in a different room), coughing, and choking / gasping for air throughout the night. Pt also endorsed non-retorative sleep and sleep inertia. Pt admitted having some EDS, but pt denied any inadvertent naps. Pt reported morning cephalgia that lasts the entire day. The headache is in the frontal area and this is sharp. No drowsiness when driving with no near accidents. No witnessed apneas  reported. Pt denied any xerostomia, nocturia, CP, SOB, morning myalgia, peripheral edema, or any other sxs or concerns with negative ROS.     The patient completed an in-lab PSG sleep study on 09/22/2017 that demonstrated moderate KENY without sleep associated hypoxemia / hypoventilation (overall AHI was 19.0 events per hour). No other abnormalities were noted during the PSG sleep study with normal ECG and EMG activity.     During the last visit the patient was placed on an APAP at 6 - 16 cmH2O. The PAP download today shows a compliant patient using the device 100% of the time with 100% used for over 4 hours (average use is 8 hours 8 minutes). The 95th percentile pressure is 12.6 cmH2O. The is not significant leaks with a 95th percentile at 8.7 L/min. The device is effective at treating the condition with a residual AHI of 0.9 events per hour.    The patient explains today that she likes the PAP device. She uses it every time she sleeps with no snoring, gasping / choking for air, or witnessed apneas. The patient also reports improved EDS with restorative sleep. No aerophagia, bloating, CP, SOB, orthopnea, or nocturnal cough. The patient is using a nasal pillow interface with no integumentary issues / concerns.     PREVIOUS IN- LAB or HOME SLEEP STUDIES: None Reported     SLEEP-WAKE SCHEDULE:      Zenobia Cantu                           -Describes herself as a morning person; prefers to go to sleep at 10:30 PM and wakes up at 8:00 AM.                           -Pt does not take any naps during the week; takes no inadvertent naps.                           -ON WEEKDAYS, goes to sleep at 9:00 PM during the week; awakens 4:30 AM with an alarm; falls asleep in 10 minutes; denies difficulty falling asleep.                           -ON WEEKENDS, goes to sleep at 10:00 PM and wakes up at 8:00 AM with an alarm; falls asleep in 10 minutes.                             -Awakens 1-2 times a night for 10 minutes before falling  back to sleep; awakens to go to the bathroom.       BEDTIME ACTIVITIES AND SHIFT WORK:     Zenobia Cantu                          -Bedtime Activities and Other Sleeping Information: Lives with . Sleeps with  on queen size bed. Pt sleeps with cat. Pt sleeps in all positions. Pt uses computer / phone on the bed. Otherwise, no other electronics used in bed.                          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                                            -Working Hours: 630 AM - 230 PM     SCALES                       SLEEP APNEA: [employer] score: 5 / 8                       SLEEPINESS: [place] sleepiness scale (ESS):  11 / 24 (initial score)                                            Drowsy driving / near accidents: Denies any near accidents                                            Consequences: EDS and non-restorative sleep     SLEEP COMPLAINTS:  Cardio-respiratory                         -Snoring: Significant snoring reported                        -Dyspnea: Pt denies having any witnessed apneas or dyspnea                       -Morning headaches or confusion: Some, but not consistent with hypoventilation headaches                       -Coexisting Lung disease: Denies diagnosed or known lung disease at this time                                                         -Coexisting Heart disease: Denies diagnosed or known cardiovascular disease at this time                                                       -Does patient have a bed partner: Patient sleeps with spouse                       -Has bed partner been sleeping separately because of snoring:  No     RLS Screen:                        -When you try to relax in the evening or sleep at night, do you ever have unpleasant, restless feelings in your legs that can be relieved by walking or movement? None Reported                          -Periodic limb movement: None Reported     Narcolepsy:                    -  Denies sudden urges of sleep attacks                  - Denies cataplexy                  - Denies sleep paralysis                   - Denies hallucinations      Sleep Behaviors:                  - Denies leg symptoms/movements                  - Denies motor restlessness                  - Denies night terrors                  - Admits bruxism (uses retainer)                  - Denies automatic behaviors     Other Subjective Complaints:                  - Denies anxiety or rumination                   - Denies pain and discomfort at  night                  - Denies waking up with heart pounding or racing                  - Denies GERD or aspiration     Parasomnia:                        -NREM - Denies recurrent persistent confusional arousal, night eating, sleep walking or sleep terrors.                          -REM  - Denies dream enactment or injuries.      -Driving Accident or Near Accidents: None reported          Medications:     Current Outpatient Prescriptions   Medication     sulfamethoxazole-trimethoprim (BACTRIM DS/SEPTRA DS) 800-160 MG per tablet     hydrochlorothiazide 12.5 MG TABS tablet     naltrexone (DEPADE;REVIA) 50 MG tablet     buPROPion (WELLBUTRIN SR) 150 MG 12 hr tablet     propranolol (INDERAL LA) 160 MG 24 hr capsule     amLODIPine (NORVASC) 10 MG tablet     metFORMIN (GLUCOPHAGE-XR) 500 MG 24 hr tablet     norgestim-eth estrad triphasic (ORTHO TRI-CYCLEN LO) 0.18/0.215/0.25 MG-25 MCG per tablet     FLUoxetine (PROZAC) 40 MG capsule     No current facility-administered medications for this visit.      Allergies   Allergen Reactions     Amoxicillin Hives           Past Medical History:      Denies needing any 02 supplement at night.       Past Medical History    No past medical history on file.                          -HTN          Past Surgical History:    Denies previous upper airway surgery.        Past Surgical History    No past surgical history on file.            Social History:                     Social History     Substance Use Topics       Smoking status: Never Smoker       Smokeless tobacco: Never Used       Alcohol use Yes         Comment: rarely        Chemical History:             Tobacco: Never smoked             Uses 1 sodas/day.             Supplements for wakefulness: Patient does not use any supplements to stay awake              EtOH: None Reported  Recreational Drugs: Patient denies using any recreational drugs      Psych Hx:   PHQ2: Negative                       -Little Interest or pleasure in doing things? 0 - not at all                       -Feeling down, depressed, or hopeless? 0 - not at all     Current dangers to self or others: No. Pt denies any SI / HI, hallucinations, or delusions          Family History:             Family History           Family History   Problem Relation Age of Onset     Hypertension Father            Sleep Family Hx:        RLS - None reported                 KENY - None reported  Insomnia - None reported  Parasomnia - None reported          Review of Systems:      A complete 10 point review of systems was negative other than HPI or as commented below:      CONSTITUTIONAL: NEGATIVE for weight gain/loss, fever, chills, sweats or night sweats, drug allergies.  EYES: NEGATIVE for changes in vision, blind spots, double vision.  ENT: NEGATIVE for ear pain, sore throat, sinus pain, post-nasal drip, runny nose, bloody nose  CARDIAC: NEGATIVE for fast heartbeats or fluttering in chest, chest pain or pressure, breathlessness when lying flat, swollen legs or swollen feet.  NEUROLOGIC: NEGATIVE headaches, weakness or numbness in the arms or legs.  DERMATOLOGIC: NEGATIVE for rashes, new moles or change in mole(s)  PULMONARY: NEGATIVE SOB at rest, SOB with activity, dry cough, productive cough, coughing up blood, wheezing or whistling when breathing.    GASTROINTESTINAL: NEGATIVE for nausea or vomitting, loose or watery stools, fat or grease in stools,  "constipation, abdominal pain, bowel movements black in color or blood noted.  GENITOURINARY: NEGATIVE for pain during urination, blood in urine, urinating more frequently than usual, irregular menstrual periods.  MUSCULOSKELETAL: NEGATIVE for muscle pain, bone or joint pain, swollen joints.  ENDOCRINE: NEGATIVE for increased thirst or urination, diabetes.  LYMPHATIC: NEGATIVE for swollen lymph nodes, lumps or bumps in the breasts or nipple discharge.          Physical Examination:   /85  Pulse 69  Resp 18  Ht 1.854 m (6' 1\")  Wt (!) 174.2 kg (384 lb)  SpO2 97%  BMI 50.66 kg/m2     VS: Reviewed and normal BP noted.  General: Alert, oriented, not in distress. Dressed casually; Good eye contact; Comfortably sitting in a chair; in no apparent distress.  HEENT: Normocephalic and atraumatic; NL TM x 2; pupils are isocoric and equally responsive to the light. PERRLA. EOMI. Normal fundoscopic examination; Nasal turbinates are normal with a normal septal alignment;  Mallampati score: Grade IV; Tonsillar hypertrophy: 2  visible at pillars; Pharynx with no erythema or exudates.  NECK: neck supple; symmetrical; no lymphadenopathy; no thyromegaly, bruit, JVD noted. Neck circumference of 17.25 inches (44 cm).  Lungs: both hemithoraces are symmetrical, normal to palpation, no dullness to percussion, auscultation of lungs revealed normal breath sounds with no expirium prolongation, wheezing, rhonci and crackles.  CVS: Normal S1 and S2 heart sounds with no extra heart sounds. No murmur, rubs, or clicks. Normal peripheral pulses throughout with no obvious peripheral edema.  Psychiatry: Mood and affect are appropriate. Euthymic with affect congruent with full range and intensity. No SI/HI with adequate insight and judgement.          Data: All pertinent previous laboratory data reviewed      No results found for: PH, PHARTERIAL, PO2, [id number], SAT, PCO2, HCO3, BASEEXCESS, MERLIN, BEB            Lab Results   Component " Value Date     TSH 1.90 07/25/2017                Lab Results   Component Value Date      (H) 08/12/2017     GLC 89 07/25/2017                Lab Results   Component Value Date     HGB 13.3 07/25/2017                Lab Results   Component Value Date     BUN 10 07/25/2017     CR 0.79 07/25/2017      Echocardiology: None Available     Chest x-ray: None Available     PFT: None Available     Laboratory Studies:   Component Value Flag Ref Range Units Status Collected Lab   Cortisol Serum 13.4   4 - 22 ug/dL Final 08/12/2017  8:02 AM 51      Component Value Flag Ref Range Units Status Collected Lab   Cholesterol 128   <200 mg/dL Final 08/12/2017  8:01 AM 65   Triglycerides 105   <150 mg/dL Final 08/12/2017  8:01 AM 65   Comment:   Fasting specimen   HDL Cholesterol 42 (L) >49 mg/dL Final 08/12/2017  8:01 AM 65   LDL Cholesterol Calculated 65   <100 mg/dL Final 08/12/2017  8:01 AM 65   Comment:   Desirable:       <100 mg/dl   Non HDL Cholesterol 86   <130 mg/dL Final 08/12/2017  8:01 AM 65      Component Value Flag Ref Range Units Status Collected Lab   Hemoglobin A1C 5.7   4.3 - 6.0 % Final 08/12/2017  8:01 AM 53      Component Value Flag Ref Range Units Status Collected Lab   WBC 5.8   4.0 - 11.0 10e9/L Final 07/25/2017  4:51 PM FM   RBC Count 4.37   3.8 - 5.2 10e12/L Final 07/25/2017  4:51 PM FM   Hemoglobin 13.3   11.7 - 15.7 g/dL Final 07/25/2017  4:51 PM FM   Hematocrit 40.3   35.0 - 47.0 % Final 07/25/2017  4:51 PM FM   MCV 92   78 - 100 fl Final 07/25/2017  4:51 PM FM   MCH 30.4   26.5 - 33.0 pg Final 07/25/2017  4:51 PM FM   MCHC 33.0   31.5 - 36.5 g/dL Final 07/25/2017  4:51 PM FM   RDW 11.5   10.0 - 15.0 % Final 07/25/2017  4:51 PM FM   Platelet Count 301   150 - 450 10e9/L Final 07/25/2017  4:51 PM FM      Component Value Flag Ref Range Units Status Collected Lab   Sodium 139   133 - 144 mmol/L Final 07/25/2017  4:51 PM 65   Potassium 4.4   3.4 - 5.3 mmol/L Final 07/25/2017  4:51 PM 65   Chloride 106    94 - 109 mmol/L Final 07/25/2017  4:51 PM 65   Carbon Dioxide 28   20 - 32 mmol/L Final 07/25/2017  4:51 PM 65   Anion Gap 5   3 - 14 mmol/L Final 07/25/2017  4:51 PM 65   Glucose 89   70 - 99 mg/dL Final 07/25/2017  4:51 PM 65   Urea Nitrogen 10   7 - 30 mg/dL Final 07/25/2017  4:51 PM 65   Creatinine 0.79   0.52 - 1.04 mg/dL Final 07/25/2017  4:51 PM 65   GFR Estimate 88   >60 mL/min/1.7m2 Final 07/25/2017  4:51 PM 65   Comment:   Non  GFR Calc   GFR Estimate If Black     >60 mL/min/1.7m2 Final 07/25/2017  4:51 PM 65   >90    GFR Calc     Calcium 8.7   8.5 - 10.1 mg/dL Final 07/25/2017  4:51 PM        Component Value Flag Ref Range Units Status Collected Lab   TSH 1.90   0.40 - 4.00 mU/L Final 07/25/2017  4:51 PM 65      Kenney Hector MD, MPH  Clinical Sleep Medicine     Total time spent with patient: 30 min. Over >50% of the time was spent for face to face counseling, education, and evaluation.

## 2017-12-08 NOTE — PATIENT INSTRUCTIONS
Your BMI is Body mass index is 50.66 kg/(m^2).  Weight management is a personal decision.  If you are interested in exploring weight loss strategies, the following discussion covers the approaches that may be successful. Body mass index (BMI) is one way to tell whether you are at a healthy weight, overweight, or obese. It measures your weight in relation to your height.  A BMI of 18.5 to 24.9 is in the healthy range. A person with a BMI of 25 to 29.9 is considered overweight, and someone with a BMI of 30 or greater is considered obese. More than two-thirds of [place] adults are considered overweight or obese.  Being overweight or obese increases the risk for further weight gain. Excess weight may lead to heart disease and diabetes.  Creating and following plans for healthy eating and physical activity may help you improve your health.  Weight control is part of healthy lifestyle and includes exercise, emotional health, and healthy eating habits. Careful eating habits lifelong are the mainstay of weight control. Though there are significant health benefits from weight loss, long-term weight loss with diet alone may be very difficult to achieve- studies show long-term success with dietary management in less than 10% of people. Attaining a healthy weight may be especially difficult to achieve in those with severe obesity. In some cases, medications, devices and surgical management might be considered.  What can you do?  If you are overweight or obese and are interested in methods for weight loss, you should discuss this with your provider.     Consider reducing daily calorie intake by 500 calories.     Keep a food journal.     Avoiding skipping meals, consider cutting portions instead.    Diet combined with exercise helps maintain muscle while optimizing fat loss. Strength training is particularly important for building and maintaining muscle mass. Exercise helps reduce stress, increase energy, and improves fitness.  Increasing exercise without diet control, however, may not burn enough calories to loose weight.       Start walking three days a week 10-20 minutes at a time    Work towards walking thirty minutes five days a week     Eventually, increase the speed of your walking for 1-2 minutes at time    In addition, we recommend that you review healthy lifestyles and methods for weight loss available through the National [employer] of [employer] patient information sites:  http://win.niddk.nih.gov/publications/index.htm    And look into health and wellness programs that may be available through your health insurance provider, employer, local community center, or [employer].    Weight management plan: Patient was referred to their PCP to discuss a diet and exercise plan.     Your blood pressure was checked while you were in clinic today.  Please read the guidelines below about what these numbers mean and what you should do about them.  Your systolic blood pressure is the top number.  This is the pressure when the heart is pumping.  Your diastolic blood pressure is the bottom number.  This is the pressure in between beats.  If your systolic blood pressure is less than 120 and your diastolic blood pressure is less than 80, then your blood pressure is normal. There is nothing more that you need to do about it  If your systolic blood pressure is 120-139 or your diastolic blood pressure is 80-89, your blood pressure may be higher than it should be.  You should have your blood pressure re-checked within a year by a primary care provider.  If your systolic blood pressure is 140 or greater or your diastolic blood pressure is 90 or greater, you may have high blood pressure.  High blood pressure is treatable, but if left untreated over time it can put you at risk for heart attack, stroke, or kidney failure.  You should have your blood pressure re-checked by a primary care provider within the next four weeks.    1. CPAP-  WHAT DOES IT DO AND  HOW CAN I LEARN TO WEAR IT?                               BEFORE I START, CAN I WATCH A MOVIE TO GET A PLAN ON HOW TO USE CPAP?  https://www.youtube.com/watch?c=c4Q38pa082P      Continuous positive airway pressure, or CPAP, is the most effective treatment for obstructive sleep apnea. It works by blowing room air, through a mask, to hold your throat open. A decision to use CPAP is a major step forward in the pursuit of a healthier life. The successful use of CPAP will help you breathe easier, sleep better and live healthier. You can choose CPAP equipment from any durable medical equipment provider that meets your needs.  Using CPAP can be a positive experience if you keep these encinas points in mind:  1. Commitment  CPAP is not a quick fix for your problem. It involves a long-term commitment to improve your sleep and your health.    2. Communication  Stay in close communication with both your sleep doctor and your CPAP supplier. Ask lots of questions and seek help when you need it.    3. Consistency  Use CPAP all night, every night and for every nap. You will receive the maximum health benefits from CPAP when you use it every time that you sleep. This will also make it easier for your body to adjust to the treatment.    4. Correction  The first machine and mask that you try may not be the best ones for you. Work with your sleep doctor and your CPAP supplier to make corrections to your equipment selection. Ask about trying a different type of machine or mask if you have ongoing problems. Make sure that your mask is a good fit and learn to use your equipment properly.    5. Challenge  Tell a family member or close friend to ask you each morning if you used your CPAP the previous night. Have someone to challenge you to give it your best effort.    6. Connection   Your adjustment to CPAP will be easier if you are able to connect with others who use the same treatment. Ask your sleep doctor if there is a support group in  "your area for people who have sleep apnea, or look for one on the Internet.  7. Comfort   Increase your level of comfort by using a saline spray, decongestant or heated humidifier if CPAP irritates your nose, mouth or throat. Use your unit's \"ramp\" setting to slowly get used to the air pressure level. There may be soft pads you can buy that will fit over your mask straps. Look on www.CPAP.com for accessories that can help make CPAP use more comfortable.  8. Cleaning   Clean your mask, tubing and headgear on a regular basis. Put this time in your schedule so that you don't forget to do it. Check and replace the filters for your CPAP unit and humidifier.    9. Completion   Although you are never finished with CPAP therapy, you should reward yourself by celebrating the completion of your first month of treatment. Expect this first month to be your hardest period of adjustment. It will involve some trial and error as you find the machine, mask and pressure settings that are right for you.    10. Continuation  After your first month of treatment, continue to make a daily commitment to use your CPAP all night, every night and for every nap.    CPAP-Tips to starting with success:  Begin using your CPAP for short periods of time during the day while you watch TV or read.    Use CPAP every night and for every nap. Using it less often reduces the health benefits and makes it harder for your body to get used to it.    Make small adjustments to your mask, tubing, straps and headgear until you get the right fit. Tightening the mask may actually worsen the leak.  If it leaves significant marks on your face or irritates the bridge of your nose, it may not be the best mask for you.  Speak with the person who supplied the mask and consider trying other masks. Insurances will allow you to try different masks during the first month of starting CPAP.  Insurance also covers a new mask, hose and filter about every 6 months.    Use a " saline nasal spray to ease mild nasal congestion. Neti-Pot or saline nasal rinses may also help. Nasal gel sprays can help reduce nasal dryness.  Biotene mouthwash can be helpful to protect your teeth if you experience frequent dry mouth.  Dry mouth may be a sign of air escaping out of your mouth or out of the mask in the case of a full face mask.  Speak with your provider if you expect that is the case.     Take a nasal decongestant to relieve more severe nasal or sinus congestion.  Do not use Afrin (oxymetazoline) nasal spray more than 3 days in a row.  Speak with your sleep doctor if your nasal congestion is chronic.    Use a heated humidifier that fits your CPAP model to enhance your breathing comfort. Adjust the heat setting up if you get a dry nose or throat, down if you get condensation in the hose or mask.  Position the CPAP lower than you so that any condensation in the hose drains back into the machine rather than towards the mask.    Try a system that uses nasal pillows if traditional masks give you problems.    Clean your mask, tubing and headgear once a week. Make sure the equipment dries fully.    Regularly check and replace the filters for your CPAP unit and humidifier.    Work closely with your sleep provider and your CPAP supplier to make sure that you have the machine, mask and air pressure setting that works best for you. It is better to stop using it and call your provider to solve problems than to lay awake all night frustrated with the device.    Daytime naps are not advised, but use the PAP device if taking naps. Many insurances require that we prove you are using the PAP device at least 4 hours on at least 70% of nights over a 30 day period. We have 90 days to meet those criteria.    You can get new supplies (mask, hose and filter) for your device every 3-6 months (covered by insurance). You do not need to get supplies that often, but they are available if you would like them. You may  exchange the mask once within the first month if you feel the initial mask does not fit well. Please, contact your medical equipment provider for equipment issues.    Please let me know if you have any snoring, daytime sleepiness, or poor sleep quality. We will want to make sure your PAP device is adequately treating your condition.    There is a website called CPAP.com that has accessories that may make CPAP use easier. Please visit it at your convenience.    Continue the good work and follow up with me within one year.    Good job!!    Happy holidays!!    Kenney Hector MD, MPH  Clinical Sleep and Occupational / Environmental Medicine

## 2017-12-08 NOTE — MR AVS SNAPSHOT
After Visit Summary   12/8/2017    Zenobia Cantu    MRN: [id number]           Patient Information     Date Of Birth          1990        Visit Information        Provider Department      12/8/2017 4:30 PM Kenney Hector MD [facility] [place]        Care Instructions      Your BMI is Body mass index is 50.66 kg/(m^2).  Weight management is a personal decision.  If you are interested in exploring weight loss strategies, the following discussion covers the approaches that may be successful. Body mass index (BMI) is one way to tell whether you are at a healthy weight, overweight, or obese. It measures your weight in relation to your height.  A BMI of 18.5 to 24.9 is in the healthy range. A person with a BMI of 25 to 29.9 is considered overweight, and someone with a BMI of 30 or greater is considered obese. More than two-thirds of American adults are considered overweight or obese.  Being overweight or obese increases the risk for further weight gain. Excess weight may lead to heart disease and diabetes.  Creating and following plans for healthy eating and physical activity may help you improve your health.  Weight control is part of healthy lifestyle and includes exercise, emotional health, and healthy eating habits. Careful eating habits lifelong are the mainstay of weight control. Though there are significant health benefits from weight loss, long-term weight loss with diet alone may be very difficult to achieve- studies show long-term success with dietary management in less than 10% of people. Attaining a healthy weight may be especially difficult to achieve in those with severe obesity. In some cases, medications, devices and surgical management might be considered.  What can you do?  If you are overweight or obese and are interested in methods for weight loss, you should discuss this with your provider.     Consider reducing daily calorie intake by 500 calories.     Keep a food  journal.     Avoiding skipping meals, consider cutting portions instead.    Diet combined with exercise helps maintain muscle while optimizing fat loss. Strength training is particularly important for building and maintaining muscle mass. Exercise helps reduce stress, increase energy, and improves fitness. Increasing exercise without diet control, however, may not burn enough calories to loose weight.       Start walking three days a week 10-20 minutes at a time    Work towards walking thirty minutes five days a week     Eventually, increase the speed of your walking for 1-2 minutes at time    In addition, we recommend that you review healthy lifestyles and methods for weight loss available through the National Institutes of Health patient information sites:  http://win.niddk.nih.gov/publications/index.htm    And look into health and wellness programs that may be available through your health insurance provider, employer, local community center, or john club.    Weight management plan: Patient was referred to their PCP to discuss a diet and exercise plan.     Your blood pressure was checked while you were in clinic today.  Please read the guidelines below about what these numbers mean and what you should do about them.  Your systolic blood pressure is the top number.  This is the pressure when the heart is pumping.  Your diastolic blood pressure is the bottom number.  This is the pressure in between beats.  If your systolic blood pressure is less than 120 and your diastolic blood pressure is less than 80, then your blood pressure is normal. There is nothing more that you need to do about it  If your systolic blood pressure is 120-139 or your diastolic blood pressure is 80-89, your blood pressure may be higher than it should be.  You should have your blood pressure re-checked within a year by a primary care provider.  If your systolic blood pressure is 140 or greater or your diastolic blood pressure is 90 or  greater, you may have high blood pressure.  High blood pressure is treatable, but if left untreated over time it can put you at risk for heart attack, stroke, or kidney failure.  You should have your blood pressure re-checked by a primary care provider within the next four weeks.    1. CPAP-  WHAT DOES IT DO AND HOW CAN I LEARN TO WEAR IT?                               BEFORE I START, CAN I WATCH A MOVIE TO GET A PLAN ON HOW TO USE CPAP?  https://www.[employer].com/watch?f=h3B90vc040L      Continuous positive airway pressure, or CPAP, is the most effective treatment for obstructive sleep apnea. It works by blowing room air, through a mask, to hold your throat open. A decision to use CPAP is a major step forward in the pursuit of a healthier life. The successful use of CPAP will help you breathe easier, sleep better and live healthier. You can choose CPAP equipment from any durable medical equipment provider that meets your needs.  Using CPAP can be a positive experience if you keep these encinas points in mind:  1. Commitment  CPAP is not a quick fix for your problem. It involves a long-term commitment to improve your sleep and your health.    2. Communication  Stay in close communication with both your sleep doctor and your CPAP supplier. Ask lots of questions and seek help when you need it.    3. Consistency  Use CPAP all night, every night and for every nap. You will receive the maximum health benefits from CPAP when you use it every time that you sleep. This will also make it easier for your body to adjust to the treatment.    4. Correction  The first machine and mask that you try may not be the best ones for you. Work with your sleep doctor and your CPAP supplier to make corrections to your equipment selection. Ask about trying a different type of machine or mask if you have ongoing problems. Make sure that your mask is a good fit and learn to use your equipment properly.    5. Challenge  Tell a family member or close  "friend to ask you each morning if you used your CPAP the previous night. Have someone to challenge you to give it your best effort.    6. Connection   Your adjustment to CPAP will be easier if you are able to connect with others who use the same treatment. Ask your sleep doctor if there is a support group in your area for people who have sleep apnea, or look for one on the Internet.  7. Comfort   Increase your level of comfort by using a saline spray, decongestant or heated humidifier if CPAP irritates your nose, mouth or throat. Use your unit's \"ramp\" setting to slowly get used to the air pressure level. There may be soft pads you can buy that will fit over your mask straps. Look on www.CPAP.com for accessories that can help make CPAP use more comfortable.  8. Cleaning   Clean your mask, tubing and headgear on a regular basis. Put this time in your schedule so that you don't forget to do it. Check and replace the filters for your CPAP unit and humidifier.    9. Completion   Although you are never finished with CPAP therapy, you should reward yourself by celebrating the completion of your first month of treatment. Expect this first month to be your hardest period of adjustment. It will involve some trial and error as you find the machine, mask and pressure settings that are right for you.    10. Continuation  After your first month of treatment, continue to make a daily commitment to use your CPAP all night, every night and for every nap.    CPAP-Tips to starting with success:  Begin using your CPAP for short periods of time during the day while you watch TV or read.    Use CPAP every night and for every nap. Using it less often reduces the health benefits and makes it harder for your body to get used to it.    Make small adjustments to your mask, tubing, straps and headgear until you get the right fit. Tightening the mask may actually worsen the leak.  If it leaves significant marks on your face or irritates the " bridge of your nose, it may not be the best mask for you.  Speak with the person who supplied the mask and consider trying other masks. Insurances will allow you to try different masks during the first month of starting CPAP.  Insurance also covers a new mask, hose and filter about every 6 months.    Use a saline nasal spray to ease mild nasal congestion. Neti-Pot or saline nasal rinses may also help. Nasal gel sprays can help reduce nasal dryness.  Biotene mouthwash can be helpful to protect your teeth if you experience frequent dry mouth.  Dry mouth may be a sign of air escaping out of your mouth or out of the mask in the case of a full face mask.  Speak with your provider if you expect that is the case.     Take a nasal decongestant to relieve more severe nasal or sinus congestion.  Do not use Afrin (oxymetazoline) nasal spray more than 3 days in a row.  Speak with your sleep doctor if your nasal congestion is chronic.    Use a heated humidifier that fits your CPAP model to enhance your breathing comfort. Adjust the heat setting up if you get a dry nose or throat, down if you get condensation in the hose or mask.  Position the CPAP lower than you so that any condensation in the hose drains back into the machine rather than towards the mask.    Try a system that uses nasal pillows if traditional masks give you problems.    Clean your mask, tubing and headgear once a week. Make sure the equipment dries fully.    Regularly check and replace the filters for your CPAP unit and humidifier.    Work closely with your sleep provider and your CPAP supplier to make sure that you have the machine, mask and air pressure setting that works best for you. It is better to stop using it and call your provider to solve problems than to lay awake all night frustrated with the device.    Daytime naps are not advised, but use the PAP device if taking naps. Many insurances require that we prove you are using the PAP device at least 4  hours on at least 70% of nights over a 30 day period. We have 90 days to meet those criteria.    You can get new supplies (mask, hose and filter) for your device every 3-6 months (covered by insurance). You do not need to get supplies that often, but they are available if you would like them. You may exchange the mask once within the first month if you feel the initial mask does not fit well. Please, contact your medical equipment provider for equipment issues.    Please let me know if you have any snoring, daytime sleepiness, or poor sleep quality. We will want to make sure your PAP device is adequately treating your condition.    There is a website called CPAP.com that has accessories that may make CPAP use easier. Please visit it at your convenience.    Continue the good work and follow up with me within one year.    Good job!!    Happy holidays!!    Kenney Hector MD, MPH  Clinical Sleep and Occupational / Environmental Medicine              Follow-ups after your visit        Your next 10 appointments already scheduled     Dec 13, 2017  3:30 PM CST   Nurse Only with [facility] NURSE   [facility]    [address] [place] [address], [address]  [facility] [address] [phone]              Who to contact     If you have questions or need follow up information about today's clinic visit or your schedule please contact [facility] directly at [phone].  Normal or non-critical lab and imaging results will be communicated to you by MyChart, letter or phone within 4 business days after the clinic has received the results. If you do not hear from us within 7 days, please contact the clinic through MyChart or phone. If you have a critical or abnormal lab result, we will notify you by phone as soon as possible.  Submit refill requests through [employer] or call your pharmacy and they will forward the refill request to us. Please allow 3 business days for your  "refill to be completed.          Additional Information About Your Visit        MyChart Information     xTV gives you secure access to your electronic health record. If you see a primary care provider, you can also send messages to your care team and make appointments. If you have questions, please call your primary care clinic.  If you do not have a primary care provider, please call [phone] and they will assist you.        Care EveryWhere ID     This is your Care EveryWhere ID. This could be used by other organizations to access your [facility] medical records  [id number]        Your Vitals Were     Pulse Respirations Height Pulse Oximetry BMI (Body Mass Index)       69 18 1.854 m (6' 1\") 97% 50.66 kg/m2        Blood Pressure from Last 3 Encounters:   12/08/17 139/85   12/07/17 150/60   12/04/17 182/58    Weight from Last 3 Encounters:   12/08/17 (!) 174.2 kg (384 lb)   12/07/17 (!) 174.2 kg (384 lb)   12/04/17 (!) 175.1 kg (386 lb)              Today, you had the following     No orders found for display       Primary Care Provider Office Phone # Fax #    Joe Ramirez PA-C [phone]       [id number]  [facility] [address]        Equal Access to Services     DAVY RAMIREZ : Hadii aad ku hadasho Soomaali, waaxda luqadaha, qaybta kaalmada adeegyada, waxay idiin hayaan adeeda khshay ladaniel . So [facility] [phone].    ATENCIÓN: Si habla español, tiene a prescott disposición servicios gratuitos de asistencia lingüística. Llame al [phone].    We comply with applicable federal civil rights laws and [place] laws. We do not discriminate on the basis of race, color, national origin, age, disability, sex, sexual orientation, or gender identity.            Thank you!     Thank you for choosing [facility] [place]  for your care. Our goal is always to provide you with excellent care. Hearing back from our patients is one way we can continue to improve our services. " Please take a few minutes to complete the written survey that you may receive in the mail after your visit with us. Thank you!             Your Updated Medication List - Protect others around you: Learn how to safely use, store and throw away your medicines at www.disposemymeds.org.          This list is accurate as of: 12/8/17  4:43 PM.  Always use your most recent med list.                   Brand Name Dispense Instructions for use Diagnosis    amLODIPine 10 MG tablet    NORVASC    90 tablet    Take 1 tablet (10 mg) by mouth daily    Essential hypertension       buPROPion 150 MG 12 hr tablet    WELLBUTRIN SR    60 tablet    Take 1 tablet (150 mg) by mouth 2 times daily    Major depressive disorder, recurrent episode, mild (H), Anxiety, Morbid obesity (H), BMI 50.0-59.9, adult (H)       FLUoxetine 40 MG capsule    PROzac     TK 1 C PO D        hydrochlorothiazide 12.5 MG Tabs tablet     30 tablet    Take 1 tablet (12.5 mg) by mouth daily    Essential hypertension       metFORMIN 500 MG 24 hr tablet    GLUCOPHAGE-XR    60 tablet    Take 1 tablet (500 mg) by mouth 2 times daily (with meals)    IFG (impaired fasting glucose)       naltrexone 50 MG tablet    DEPADE;REVIA    30 tablet    Take with food. Titrate as follows: Week one: 1/4 tablet with breakfast Week two: 1/4 tablet with breakfast and dinner Week three: 1/2 tablet with breakfast, 1/4 tablet with dinner. Week four and thereafter: 1/2 tablet twice daily.    Morbid obesity (H), BMI 50.0-59.9, adult (H), Prediabetes       norgestim-eth estrad triphasic 0.18/0.215/0.25 MG-25 MCG per tablet    ORTHO TRI-CYCLEN LO    84 tablet    Take 1 tablet by mouth daily    Encounter for surveillance of contraceptive pills       propranolol 160 MG 24 hr capsule    INDERAL LA    30 capsule    Take 1 capsule (160 mg) by mouth daily    Essential hypertension       sulfamethoxazole-trimethoprim 800-160 MG per tablet    BACTRIM DS/SEPTRA DS    20 tablet    Take 1 tablet by mouth 2  times daily    Pilonidal cyst with abscess

## 2017-12-08 NOTE — NURSING NOTE
"Chief Complaint   Patient presents with     RECHECK     f/u cpap       Initial /83  Pulse 69  Resp 18  Ht 1.854 m (6' 1\")  Wt (!) 174.2 kg (384 lb)  SpO2 97%  BMI 50.66 kg/m2 Estimated body mass index is 50.66 kg/(m^2) as calculated from the following:    Height as of this encounter: 1.854 m (6' 1\").    Weight as of this encounter: 174.2 kg (384 lb).  Medication Reconciliation: complete         Elsy Leggett LPN/SAMANTHA  "

## 2017-12-14 ENCOUNTER — ALLIED HEALTH/NURSE VISIT (OUTPATIENT)
Dept: FAMILY MEDICINE | Facility: CLINIC | Age: 27
End: 2017-12-14
Payer: COMMERCIAL

## 2017-12-14 VITALS — SYSTOLIC BLOOD PRESSURE: 128 MMHG | DIASTOLIC BLOOD PRESSURE: 80 MMHG

## 2017-12-14 DIAGNOSIS — Z01.30 BP CHECK: Primary | ICD-10-CM

## 2017-12-14 PROCEDURE — 99207 ZZC NO CHARGE NURSE ONLY: CPT | Performed by: PHYSICIAN ASSISTANT

## 2017-12-14 NOTE — PROGRESS NOTES
Zenobia Cantu is enrolled/participating in the retail pharmacy Blood Pressure Goals Achievement Program (BPGAP).  Zenobia Cantu was evaluated at [facility] on December 14, 2017 at which time her blood pressure was:    BP Readings from Last 3 Encounters:   12/14/17 128/80   12/08/17 139/85   12/07/17 150/60     Reviewed lifestyle modifications for blood pressure control and reduction: including making healthy food choices, managing weight, getting regular exercise, smoking cessation, reducing alcohol consumption, monitoring blood pressure regularly.     Zenobia Cantu is not experiencing symptoms.    Follow-Up: BP is at goal of < 140/90mmHg (patient 18+ years of age with or without diabetes).  Recommended follow-up at pharmacy in 6 months.     Recommendation to Provider: Continue current management.    Zenobia Cantu was evaluated for enrollment into the PGEN study today.    Patient eligible for enrollment:  No  Patient interested in enrollment:  No    Completed by: Sapna De Los Santos, Pharm.D. IV Student

## 2017-12-14 NOTE — MR AVS SNAPSHOT
After Visit Summary   12/14/2017    Zenobia Cantu    MRN: [id number]           Patient Information     Date Of Birth          1990        Visit Information        Provider Department      12/14/2017 4:36 PM Joe Ramirez PA-C [facility]        Today's Diagnoses     BP check    -  1       Follow-ups after your visit        Who to contact     If you have questions or need follow up information about today's clinic visit or your schedule please contact [facility] directly at [phone].  Normal or non-critical lab and imaging results will be communicated to you by PayOrPasshart, letter or phone within 4 business days after the clinic has received the results. If you do not hear from us within 7 days, please contact the clinic through [employer] or phone. If you have a critical or abnormal lab result, we will notify you by phone as soon as possible.  Submit refill requests through Treater or call your pharmacy and they will forward the refill request to us. Please allow 3 business days for your refill to be completed.          Additional Information About Your Visit        MyChart Information     Treater gives you secure access to your electronic health record. If you see a primary care provider, you can also send messages to your care team and make appointments. If you have questions, please call your primary care clinic.  If you do not have a primary care provider, please call [phone] and they will assist you.        Care EveryWhere ID     This is your Care EveryWhere ID. This could be used by other organizations to access your [facility] medical records  [id number]         Blood Pressure from Last 3 Encounters:   12/14/17 128/80   12/08/17 139/85   12/07/17 150/60    Weight from Last 3 Encounters:   12/08/17 (!) 384 lb (174.2 kg)   12/07/17 (!) 384 lb (174.2 kg)   12/04/17 (!) 386 lb (175.1 kg)              Today, you had the following     No orders found  for display       Primary Care Provider Office Phone # Fax #    Joe Ramirez PA-C [phone]  [facility] [id number]        Equal Access to Services     DAVY RAMIREZ : Charli farley zhaoo Soomaali, waaxda luqadaha, qaybta kaalmada adeegyada, waxdavid dennyn jaden snyder cirilo lemos. So [facility] [phone].    ATENCIÓN: Si habla español, tiene a prescott disposición servicios gratuitos de asistencia lingüística. Llame al [phone].    We comply with applicable federal civil rights laws and [place] laws. We do not discriminate on the basis of race, color, national origin, age, disability, sex, sexual orientation, or gender identity.            Thank you!     Thank you for choosing [facility]  for your care. Our goal is always to provide you with excellent care. Hearing back from our patients is one way we can continue to improve our services. Please take a few minutes to complete the written survey that you may receive in the mail after your visit with us. Thank you!             Your Updated Medication List - Protect others around you: Learn how to safely use, store and throw away your medicines at www.disposemymeds.org.          This list is accurate as of: 12/14/17  4:37 PM.  Always use your most recent med list.                   Brand Name Dispense Instructions for use Diagnosis    amLODIPine 10 MG tablet    NORVASC    90 tablet    Take 1 tablet (10 mg) by mouth daily    Essential hypertension       buPROPion 150 MG 12 hr tablet    WELLBUTRIN SR    60 tablet    Take 1 tablet (150 mg) by mouth 2 times daily    Major depressive disorder, recurrent episode, mild (H), Anxiety, Morbid obesity (H), BMI 50.0-59.9, adult (H)       FLUoxetine 40 MG capsule    PROzac     TK 1 C PO D        hydrochlorothiazide 12.5 MG Tabs tablet     30 tablet    Take 1 tablet (12.5 mg) by mouth daily    Essential hypertension       metFORMIN 500 MG 24 hr tablet     GLUCOPHAGE-XR    60 tablet    Take 1 tablet (500 mg) by mouth 2 times daily (with meals)    IFG (impaired fasting glucose)       naltrexone 50 MG tablet    DEPADE;REVIA    30 tablet    Take with food. Titrate as follows: Week one: 1/4 tablet with breakfast Week two: 1/4 tablet with breakfast and dinner Week three: 1/2 tablet with breakfast, 1/4 tablet with dinner. Week four and thereafter: 1/2 tablet twice daily.    Morbid obesity (H), BMI 50.0-59.9, adult (H), Prediabetes       norgestim-eth estrad triphasic 0.18/0.215/0.25 MG-25 MCG per tablet    ORTHO TRI-CYCLEN LO    84 tablet    Take 1 tablet by mouth daily    Encounter for surveillance of contraceptive pills       propranolol 160 MG 24 hr capsule    INDERAL LA    30 capsule    Take 1 capsule (160 mg) by mouth daily    Essential hypertension       sulfamethoxazole-trimethoprim 800-160 MG per tablet    BACTRIM DS/SEPTRA DS    20 tablet    Take 1 tablet by mouth 2 times daily    Pilonidal cyst with abscess

## 2018-01-16 ENCOUNTER — MYC MEDICAL ADVICE (OUTPATIENT)
Dept: FAMILY MEDICINE | Facility: CLINIC | Age: 28
End: 2018-01-16

## 2018-01-22 DIAGNOSIS — I10 ESSENTIAL HYPERTENSION: ICD-10-CM

## 2018-01-22 NOTE — TELEPHONE ENCOUNTER
"Requested Prescriptions   Pending Prescriptions Disp Refills     propranolol (INDERAL LA) 160 MG 24 hr capsule [Pharmacy Med Name: PROPRANOLOL ER 160MG CAPSULES] 30 capsule 0    Last Written Prescription Date:  10/31/17  Last Fill Quantity: 30,  # refills: 1   Last Office Visit with [facility], [facility] or [facility] prescribing provider:  12/7/2017   Future Office Visit:      Sig: TAKE 1 CAPSULE(160 MG) BY MOUTH DAILY    Beta-Blockers Protocol Passed    1/22/2018  7:51 AM       Passed - Blood pressure under 140/90    BP Readings from Last 3 Encounters:   12/14/17 128/80   12/08/17 139/85   12/07/17 150/60          Passed - Patient is age 6 or older       Passed - Recent or future visit with authorizing provider's specialty    Patient had office visit in the last year or has a visit in the next 30 days with authorizing provider.  See \"Patient Info\" tab in inbasket, or \"Choose Columns\" in Meds & Orders section of the refill encounter.               "

## 2018-01-23 RX ORDER — PROPRANOLOL HYDROCHLORIDE 160 MG/1
CAPSULE, EXTENDED RELEASE ORAL
Qty: 30 CAPSULE | Refills: 5 | Status: SHIPPED | OUTPATIENT
Start: 2018-01-23 | End: 2018-03-19

## 2018-01-23 NOTE — TELEPHONE ENCOUNTER
Prescription approved per [facility] Refill Protocol.  Magnolia Oswald RN

## 2018-02-20 DIAGNOSIS — R73.01 IFG (IMPAIRED FASTING GLUCOSE): ICD-10-CM

## 2018-02-21 ENCOUNTER — MYC MEDICAL ADVICE (OUTPATIENT)
Dept: ENDOCRINOLOGY | Facility: CLINIC | Age: 28
End: 2018-02-21

## 2018-02-21 RX ORDER — METFORMIN HCL 500 MG
TABLET, EXTENDED RELEASE 24 HR ORAL
Qty: 60 TABLET | Refills: 0 | Status: SHIPPED | OUTPATIENT
Start: 2018-02-21 | End: 2018-02-22

## 2018-02-21 NOTE — TELEPHONE ENCOUNTER
Not PSO diagnosis.  Does not appear taking as ordered.  She had sent [employer] message to see when she is to come for visit.  Is due for visit now.  Advised she schedule prior to Contrave refill.  Mar Roberts RN    Associated Diagnoses      IFG (impaired fasting glucose) [R73.01]  - Primary

## 2018-02-21 NOTE — TELEPHONE ENCOUNTER
"Requested Prescriptions   Pending Prescriptions Disp Refills     metFORMIN (GLUCOPHAGE-XR) 500 MG 24 hr tablet [Pharmacy Med Name: METFORMIN ER 500MG 24HR TABS]  Last Written Prescription Date:8/14/2017  Last Fill Quantity: 60 tablet,  # refills: 1   Last office visit: 11/6/2017 with prescribing provider:  Jam    60 tablet 0     Sig: TAKE 1 TABLET(500 MG) BY MOUTH TWICE DAILY WITH MEALS    Biguanide Agents Failed    2/20/2018  9:08 AM       Failed - Patient has had a Microalbumin in the past 12 mos.    No lab results found.       Failed - Patient has documented A1c within the specified period of time.    Recent Labs   Lab Test  08/12/17   0801   A1C  5.7          Passed - Blood pressure less than 140/90 in past 6 months    BP Readings from Last 3 Encounters:   12/14/17 128/80   12/08/17 139/85   12/07/17 150/60          Passed - Patient has documented LDL within the past 12 mos.    Recent Labs   Lab Test  08/12/17   0801   LDL  65          Passed - Patient is age 10 or older       Passed - Patient's CR is NOT>1.4 OR Patient's EGFR is NOT<45 within past 12 mos.    Recent Labs   Lab Test  12/04/17   1550   GFRESTIMATED  >90   GFRESTBLACK  >90     Recent Labs   Lab Test  12/04/17   1550   CR  0.75          Passed - Patient does NOT have a diagnosis of CHF.       Passed - Patient is not pregnant       Passed - Patient has not had a positive pregnancy test within the past 12 mos.        Passed - Recent (6 mos) or future visit with authorizing provider's specialty    Patient had office visit in the last 6 months or has a visit in the next 30 days with authorizing provider.  See \"Patient Info\" tab in inbasket, or \"Choose Columns\" in Meds & Orders section of the refill encounter.              "

## 2018-02-22 RX ORDER — METFORMIN HCL 500 MG
TABLET, EXTENDED RELEASE 24 HR ORAL
Qty: 60 TABLET | Refills: 3 | Status: SHIPPED | OUTPATIENT
Start: 2018-02-22 | End: 2018-03-26

## 2018-03-19 ENCOUNTER — OFFICE VISIT (OUTPATIENT)
Dept: FAMILY MEDICINE | Facility: CLINIC | Age: 28
End: 2018-03-19
Payer: COMMERCIAL

## 2018-03-19 VITALS
WEIGHT: 293 LBS | TEMPERATURE: 98 F | HEIGHT: 72 IN | DIASTOLIC BLOOD PRESSURE: 80 MMHG | HEART RATE: 68 BPM | OXYGEN SATURATION: 98 % | RESPIRATION RATE: 16 BRPM | SYSTOLIC BLOOD PRESSURE: 156 MMHG | BODY MASS INDEX: 39.68 KG/M2

## 2018-03-19 DIAGNOSIS — F33.0 MAJOR DEPRESSIVE DISORDER, RECURRENT EPISODE, MILD (H): ICD-10-CM

## 2018-03-19 DIAGNOSIS — I10 ESSENTIAL HYPERTENSION: Primary | ICD-10-CM

## 2018-03-19 DIAGNOSIS — Z30.41 ENCOUNTER FOR SURVEILLANCE OF CONTRACEPTIVE PILLS: ICD-10-CM

## 2018-03-19 PROCEDURE — 99214 OFFICE O/P EST MOD 30 MIN: CPT | Performed by: PHYSICIAN ASSISTANT

## 2018-03-19 RX ORDER — PROPRANOLOL HYDROCHLORIDE 160 MG/1
CAPSULE, EXTENDED RELEASE ORAL
Qty: 90 CAPSULE | Refills: 1 | Status: SHIPPED | OUTPATIENT
Start: 2018-03-19 | End: 2018-03-26

## 2018-03-19 RX ORDER — ACETAMINOPHEN AND CODEINE PHOSPHATE 120; 12 MG/5ML; MG/5ML
1 SOLUTION ORAL DAILY
Qty: 84 TABLET | Refills: 1 | Status: SHIPPED | OUTPATIENT
Start: 2018-03-19 | End: 2018-07-28

## 2018-03-19 RX ORDER — AMLODIPINE BESYLATE 10 MG/1
10 TABLET ORAL DAILY
Qty: 90 TABLET | Refills: 1 | Status: SHIPPED | OUTPATIENT
Start: 2018-03-19 | End: 2018-12-13

## 2018-03-19 RX ORDER — HYDROCHLOROTHIAZIDE 25 MG/1
25 TABLET ORAL DAILY
Qty: 30 TABLET | Refills: 1 | Status: SHIPPED | OUTPATIENT
Start: 2018-03-19 | End: 2018-12-13

## 2018-03-19 NOTE — PROGRESS NOTES
"History of Present Illness     Hypertension:     Outpatient blood pressures:  Are being checked    Blood pressures checked at:  [facility]    Dietary sodium intake::  No added salt diet    Diet:  Regular (no restrictions)  Frequency of exercise:  4-5 days/week  Duration of exercise:  30-45 minutes  Taking medications regularly:  Yes  Additional concerns today:  No        BP Readings from Last 6 Encounters:   03/19/18 156/80   12/14/17 128/80   12/08/17 139/85   12/07/17 150/60   12/04/17 182/58   11/06/17 142/76     Wt Readings from Last 4 Encounters:   03/19/18 (!) 382 lb 6.4 oz (173.5 kg)   12/08/17 (!) 384 lb (174.2 kg)   12/07/17 (!) 384 lb (174.2 kg)   12/04/17 (!) 386 lb (175.1 kg)       Zenobia is here for BP recheck.    When she checks at home she is getting 125-135/80.  Seeing Endo next week for follow up of weight.  Mood is good, back is feeling better.      Problem list and histories reviewed & adjusted, as indicated.  Additional history: as documented    Labs reviewed in EPIC    Reviewed and updated as needed this visit by clinical staff  Tobacco  Allergies       Reviewed and updated as needed this visit by Provider         ROS:  Constitutional, HEENT, cardiovascular, pulmonary, gi and gu systems are negative, except as otherwise noted.    OBJECTIVE:     /80 (BP Location: Right arm, Patient Position: Chair, Cuff Size: Adult Large)  Pulse 68  Temp 98  F (36.7  C) (Oral)  Resp 16  Ht 6' 1\" (1.854 m)  Wt (!) 382 lb 6.4 oz (173.5 kg)  SpO2 98%  BMI 50.45 kg/m2  Body mass index is 50.45 kg/(m^2).  GENERAL: healthy, alert and no distress  CV: regular rate and rhythm, normal S1 S2, no S3 or S4, no murmur, click or rub, no peripheral edema and peripheral pulses strong  MS: no gross musculoskeletal defects noted, no edema  SKIN: no suspicious lesions or rashes  PSYCH: mentation appears normal, affect normal/bright    Diagnostic Test Results:  none    ASSESSMENT/PLAN:   1. Essential " hypertension  Increase diuretic to 25mg, BP check in clinic in next couple weeks.  - hydrochlorothiazide (HYDRODIURIL) 25 MG tablet; Take 1 tablet (25 mg) by mouth daily  Dispense: 30 tablet; Refill: 1  - amLODIPine (NORVASC) 10 MG tablet; Take 1 tablet (10 mg) by mouth daily  Dispense: 90 tablet; Refill: 1    2. Encounter for surveillance of contraceptive pills  We decided to change to progesterone only pill today.  Perhaps estrogen is a factor in her resistant HTN.  Could consider renal US if BP isn't responding.  - norethindrone (MICRONOR) 0.35 MG per tablet; Take 1 tablet (0.35 mg) by mouth daily  Dispense: 84 tablet; Refill: 1    3. Major depressive disorder, recurrent episode, mild (H)  - continue with medications, she reports to be doing very well.  PHQ-9 SCORE 7/25/2017 7/31/2017 3/19/2018   Total Score 2 3 0           Joe Ramirez PA-C  [facility]      Physical Exam

## 2018-03-19 NOTE — MR AVS SNAPSHOT
After Visit Summary   3/19/2018    Zenobia Cantu    MRN: [id number]           Patient Information     Date Of Birth          1990        Visit Information        Provider Department      3/19/2018 3:40 PM Joe Ramirez PA-C [facility]        Today's Diagnoses     Essential hypertension    -  1    Encounter for surveillance of contraceptive pills           Follow-ups after your visit        Follow-up notes from your care team     Return in about 2 weeks (around 4/2/2018) for BP Recheck with Nurse.      Your next 10 appointments already scheduled     Mar 26, 2018  3:30 PM CDT   [facility] with LENORA Javier CNP   [facility] ([facility])    [address] [place] [address] [phone]              Who to contact     If you have questions or need follow up information about today's clinic visit or your schedule please contact [facility] directly at [phone].  Normal or non-critical lab and imaging results will be communicated to you by Bilnahart, letter or phone within 4 business days after the clinic has received the results. If you do not hear from us within 7 days, please contact the clinic through Gamifyt or phone. If you have a critical or abnormal lab result, we will notify you by phone as soon as possible.  Submit refill requests through [employer] or call your pharmacy and they will forward the refill request to us. Please allow 3 business days for your refill to be completed.          Additional Information About Your Visit        Bilnahart Information     [employer] gives you secure access to your electronic health record. If you see a primary care provider, you can also send messages to your care team and make appointments. If you have questions, please call your primary care clinic.  If you do not have a primary care provider, please call [phone] and they will assist  "you.        Care EveryWhere ID     This is your Care EveryWhere ID. This could be used by other organizations to access your [place] medical records  [id number]        Your Vitals Were     Pulse Temperature Respirations Height Pulse Oximetry BMI (Body Mass Index)    68 98  F (36.7  C) (Oral) 16 6' 1\" (1.854 m) 98% 50.45 kg/m2       Blood Pressure from Last 3 Encounters:   03/19/18 156/80   12/14/17 128/80   12/08/17 139/85    Weight from Last 3 Encounters:   03/19/18 (!) 382 lb 6.4 oz (173.5 kg)   12/08/17 (!) 384 lb (174.2 kg)   12/07/17 (!) 384 lb (174.2 kg)              Today, you had the following     No orders found for display         Today's Medication Changes          These changes are accurate as of 3/19/18  4:10 PM.  If you have any questions, ask your nurse or doctor.               Start taking these medicines.        Dose/Directions    norethindrone 0.35 MG per tablet   Commonly known as:  MICRONOR   Used for:  Encounter for surveillance of contraceptive pills   Started by:  Joe Ramirez PA-C        Dose:  1 tablet   Take 1 tablet (0.35 mg) by mouth daily   Quantity:  84 tablet   Refills:  1         These medicines have changed or have updated prescriptions.        Dose/Directions    hydrochlorothiazide 25 MG tablet   Commonly known as:  HYDRODIURIL   This may have changed:    - medication strength  - how much to take   Used for:  Essential hypertension   Changed by:  Joe Ramirez PA-C        Dose:  25 mg   Take 1 tablet (25 mg) by mouth daily   Quantity:  30 tablet   Refills:  1       propranolol 160 MG 24 hr capsule   Commonly known as:  INDERAL LA   This may have changed:  See the new instructions.   Used for:  Essential hypertension   Changed by:  Joe Ramirez PA-C        TAKE 1 CAPSULE(160 MG) BY MOUTH DAILY   Quantity:  90 capsule   Refills:  1         Stop taking these medicines if you haven't already. Please contact your care team if you have questions.     " norgestim-eth estrad triphasic 0.18/0.215/0.25 MG-25 MCG per tablet   Commonly known as:  ORTHO TRI-CYCLEN LO   Stopped by:  Joe Ramirez PA-C           sulfamethoxazole-trimethoprim 800-160 MG per tablet   Commonly known as:  BACTRIM DS/SEPTRA DS   Stopped by:  Joe Ramirez PA-C                Where to get your medicines      These medications were sent to [facility] [id number] [place] [address] - [facility] [address] [facility] AT [facility] [address] [facility] [address]     Phone:  [phone]     amLODIPine 10 MG tablet    hydrochlorothiazide 25 MG tablet    norethindrone 0.35 MG per tablet    propranolol 160 MG 24 hr capsule                Primary Care Provider Office Phone # Fax #    Joe Ramirez PA-C [phone]  [address] [facility] [address]        Equal Access to Services     DAVY RAMIREZ AH: Hadii aad ku hadasho Soomaali, waaxda luqadaha, qaybta kaalmada adeegyada, waxay idiin hayaan adeeg lillian kerr . So [facility] [phone].    ATENCIÓN: Si habla español, tiene a prescott disposición servicios gratuitos de asistencia lingüística. Llame al [phone].    We comply with applicable federal civil rights laws and [place] laws. We do not discriminate on the basis of race, color, national origin, age, disability, sex, sexual orientation, or gender identity.            Thank you!     Thank you for choosing [facility]  for your care. Our goal is always to provide you with excellent care. Hearing back from our patients is one way we can continue to improve our services. Please take a few minutes to complete the written survey that you may receive in the mail after your visit with us. Thank you!             Your Updated Medication List - Protect others around you: Learn how to safely use, store and throw away your medicines at www.disposemymeds.org.          This list is accurate as of 3/19/18  4:10 PM.  Always use your most recent med  list.                   Brand Name Dispense Instructions for use Diagnosis    amLODIPine 10 MG tablet    NORVASC    90 tablet    Take 1 tablet (10 mg) by mouth daily    Essential hypertension       buPROPion 150 MG 12 hr tablet    WELLBUTRIN SR    60 tablet    Take 1 tablet (150 mg) by mouth 2 times daily    Major depressive disorder, recurrent episode, mild (H), Anxiety, Morbid obesity (H), BMI 50.0-59.9, adult (H)       FLUoxetine 40 MG capsule    PROzac     TK 1 C PO D        hydrochlorothiazide 25 MG tablet    HYDRODIURIL    30 tablet    Take 1 tablet (25 mg) by mouth daily    Essential hypertension       metFORMIN 500 MG 24 hr tablet    GLUCOPHAGE-XR    60 tablet    TAKE 1 TABLET(500 MG) BY MOUTH TWICE DAILY WITH MEALS    IFG (impaired fasting glucose)       naltrexone 50 MG tablet    DEPADE;REVIA    30 tablet    Take with food. Titrate as follows: Week one: 1/4 tablet with breakfast Week two: 1/4 tablet with breakfast and dinner Week three: 1/2 tablet with breakfast, 1/4 tablet with dinner. Week four and thereafter: 1/2 tablet twice daily.    Morbid obesity (H), BMI 50.0-59.9, adult (H), Prediabetes       norethindrone 0.35 MG per tablet    MICRONOR    84 tablet    Take 1 tablet (0.35 mg) by mouth daily    Encounter for surveillance of contraceptive pills       propranolol 160 MG 24 hr capsule    INDERAL LA    90 capsule    TAKE 1 CAPSULE(160 MG) BY MOUTH DAILY    Essential hypertension

## 2018-03-20 ASSESSMENT — PATIENT HEALTH QUESTIONNAIRE - PHQ9: SUM OF ALL RESPONSES TO PHQ QUESTIONS 1-9: 0

## 2018-03-26 ENCOUNTER — OFFICE VISIT (OUTPATIENT)
Dept: ENDOCRINOLOGY | Facility: CLINIC | Age: 28
End: 2018-03-26
Payer: COMMERCIAL

## 2018-03-26 VITALS
WEIGHT: 293 LBS | TEMPERATURE: 98.1 F | BODY MASS INDEX: 50.53 KG/M2 | SYSTOLIC BLOOD PRESSURE: 155 MMHG | DIASTOLIC BLOOD PRESSURE: 89 MMHG | HEART RATE: 86 BPM | RESPIRATION RATE: 16 BRPM

## 2018-03-26 DIAGNOSIS — R73.01 IFG (IMPAIRED FASTING GLUCOSE): ICD-10-CM

## 2018-03-26 DIAGNOSIS — R73.03 PREDIABETES: ICD-10-CM

## 2018-03-26 DIAGNOSIS — E66.01 MORBID OBESITY (H): ICD-10-CM

## 2018-03-26 PROCEDURE — 99213 OFFICE O/P EST LOW 20 MIN: CPT | Performed by: CLINICAL NURSE SPECIALIST

## 2018-03-26 RX ORDER — NALTREXONE HYDROCHLORIDE 50 MG/1
TABLET, FILM COATED ORAL
Qty: 90 TABLET | Refills: 1 | Status: SHIPPED | OUTPATIENT
Start: 2018-03-26 | End: 2018-04-26 | Stop reason: SINTOL

## 2018-03-26 RX ORDER — METFORMIN HCL 500 MG
TABLET, EXTENDED RELEASE 24 HR ORAL
Qty: 360 TABLET | Refills: 3 | Status: SHIPPED | OUTPATIENT
Start: 2018-03-26 | End: 2019-04-17

## 2018-03-26 RX ORDER — PROPRANOLOL HYDROCHLORIDE 160 MG/1
CAPSULE, EXTENDED RELEASE ORAL
Qty: 90 CAPSULE | Refills: 1 | Status: SHIPPED | OUTPATIENT
Start: 2018-03-26 | End: 2018-07-16

## 2018-03-26 NOTE — LETTER
3/26/2018         RE: Zenobia Cantu  [address] [address]        Dear Colleague,    Thank you for referring your patient, Zenobia Cantu, to the [facility]. Please see a copy of my visit note below.    Name: Zenobia Cantu  F/u for obesity (Last seen 11/6/2017).  HPI:  Zenobia Cantu is a 28 year old female who presents for the evaluation/managment of obestiy  Has tried many different things for weight loss - over the counter weight loss supplements, weight watchers online, food diary/calorie tracking.  History of eating disorder. Binge eating disorder.  Currently not in an eating disorder program.      Menses: menarche age 13, cycles regular, currently on birth control - now periods shorter with lighter flow.  Diarrhea/Constipation:No chronic diarrhea or constipation problems, stress causes intermittent constipation  Changes in Hair or Skin:No  Diabetes:No  Sleep: no problems falling or staying asleep  Sleep Apnea.  On Cpap  Hypertension or CAD: No CAD, + HTN, currently treated with Amlodipine 10 mg qd and HCTZ 25 mg qd..   Vital Signs 9/29/2017 10/3/2017 11/6/2017   Systolic 154 160 142   Diastolic 88 84 76     Hyperlipidemia:No  Hirsutism:No  Easy Brusing:Yes: bruises easily  Use of Steroids:No  Family history of Obesity:No  Diet: portion control  Exercise:Yes: 5 days per week, cardio and weights, [employer], working out up to 1.5 hours per day.  PMH/PSH:  Past Medical History:   Diagnosis Date     Depressive disorder 2010     Essential hypertension 8/28/2017     History reviewed. No pertinent surgical history.  Family Hx:  Family History   Problem Relation Age of Onset     Hypertension Father      He lives a healthy lifestyle     Depression Father      Hypertension Paternal Grandmother      Hypertension Other      Believe my grandmother's mom had problems too     Thyroid disease: No         DM2: Yes: MGM         Autoimmune: DM1, SLE, RA, Vitiligo No    Social Hx:  Social  History     Social History     Marital status:      Spouse name: N/A     Number of children: N/A     Years of education: N/A     Occupational History     Not on file.     Social History Main Topics     Smoking status: Never Smoker     Smokeless tobacco: Never Used     Alcohol use Yes      Comment: rarely     Drug use: No     Sexual activity: Yes     Partners: Male     Birth control/ protection: Pill     Other Topics Concern     Parent/Sibling W/ Cabg, Mi Or Angioplasty Before 65f 55m? No     Social History Narrative          MEDICATIONS:  has a current medication list which includes the following prescription(s): norethindrone, hydrochlorothiazide, propranolol, amlodipine, metformin, naltrexone, bupropion, and fluoxetine.    ROS   ROS: 10 point ROS neg other than the symptoms noted above in the HPI.    GENERAL: no fevers, chills, malaise, night sweats.   HEENT: no dysphagia, odonophagia, diplopia, neck pain or tenderness  CV: no chest pain, pressure, palpitations, skipped beats, LOC  LUNGS: no SOB, HELM, cough, sputum production, wheezing   ABDOMEN: no diarrhea, constipation, abdominal pain  EXTREMITIES: no rashes, ulcers, edema  NEUROLOGY: no changes in vision, tingling or numbness in hands or feet.   MSK: no muscle aches or pains, weakness  SKIN: no rashes or lesions  ENDOCRINE: no heat or cold intolerance    Physical Exam   VS: /89 (BP Location: Left arm, Patient Position: Chair, Cuff Size: Adult Large)  Pulse 86  Temp 98.1  F (36.7  C) (Oral)  Resp 16  Wt (!) 173.7 kg (383 lb)  BMI 50.53 kg/m2  GENERAL: AXOX3, NAD, well dressed, answering questions appropriately, appears stated age.  HEENT: OP clear, no LAD, no TM, non-tender, no exopthalmous, no proptosis, EOMI, no lig lag, no retraction  NECK:  Supple, no thyromegaly, no adenopathy  CV: RRR, no rubs, gallops, no murmurs  LUNGS: CTAB, no wheezes, rales, or ronchi  ABDOMEN: soft, nontender, nondistended, no broad striae noted.  EXTREMITIES: no  "edema, +pulses, no rashes, no lesions  NEUROLOGY: CN grossly intact, no tremors  MSK: grossly intact  SKIN: no acanthosis, skin tags; no rashes, no lesions, no intertrigo    LABS:  !COMPREHENSIVE Latest Ref Rng & Units 12/4/2017   SODIUM 133 - 144 mmol/L 139   POTASSIUM 3.4 - 5.3 mmol/L 4.2   CHLORIDE 94 - 109 mmol/L 105   BUN 7 - 30 mg/dL 8   Creatinine 0.52 - 1.04 mg/dL 0.75   Glucose 70 - 99 mg/dL 131 (H)   ANION GAP 3 - 14 mmol/L 10   CALCIUM 8.5 - 10.1 mg/dL 8.9     Component    Latest Ref Rng & Units 8/12/2017   Glucose    70 - 99 mg/dL 103 (H)   Insulin   3 - 25 mU/L 21.7   C-Peptide    0.9 - 6.9 ng/mL 3.3   Hemoglobin A1C    4.3 - 6.0 % 5.7     !LIPID/HEPATIC Latest Ref Rng & Units 8/12/2017   CHOLESTEROL <200 mg/dL 128   TRIGLYCERIDES <150 mg/dL 105   HDL CHOLESTEROL >49 mg/dL 42 (L)   LDL CHOLESTEROL, CALCULATED <100 mg/dL 65   NON HDL CHOLESTEROL <130 mg/dL 86     !THYROID Latest Ref Rng & Units 7/25/2017   TSH 0.40 - 4.00 mU/L 1.90     Component    Latest Ref Rng & Units 7/25/2017   WBC    4.0 - 11.0 10e9/L 5.8   RBC Count    3.8 - 5.2 10e12/L 4.37   Hemoglobin    11.7 - 15.7 g/dL 13.3   Hematocrit    35.0 - 47.0 % 40.3     Vital Signs 8/2/2017   Weight (LB) 383 lb 14.4 oz     Vital Signs 9/29/2017 10/3/2017 11/6/2017   Weight (LB) 380 lb 379 lb 387 lb   Height 6' 1.5\"     BMI (Calculated) 49.56       Vital Signs 3/19/2018 3/26/2018   Weight (LB) 382 lb 6.4 oz 383 lb   Height 6' 1\"    BMI (Calculated) 50.56 50.53     Waist Circumference: 63\" (8/2/2017).  62\" (11/6/2017).  62\" (today).    All pertinent notes, labs, and images personally reviewed by me.     A/P  Ms.Amanda Cantu is a 28 year old here for the management of obestiy:    1. Morbid Obesity-  BMI > 50 with comorbidities - HTN, prediabetes, KENY.    Obesity is associated with a significant increase in mortality and risk of many disorders, including diabetes mellitus, hypertension, dyslipidemia, heart disease, stroke, sleep apnea, cancer, and many " others. Conversely, weight loss is associated with a reduction in obesity-associated morbidity.    Endocrine evaluation of obesity includes Diabetes/prediabetes, Cushing's and thyroid dysfunction.  The relevant work up for the diagnosis and management of obesity and various treatment options were discussed. Body Mass Index (BMI) has been a standard means for calculating risk for overweight and obesity. The new American Association of Clinical Endocrinology (AACE) algorithm recommends lifestyle modifications as the initial phase of treatment for all patients with the BMI equal or greater than 25 kg/m2. Lifestyle modifications includes use of medical nutrition therapy, exercise, tobacco cessation, adequate quality and quantity of sleep, limited consumption of alcohol and reduced stress through implementation of a structured, multidisciplinary program.    In patients with complications associated with obesity, graded interventions are recommended including pharmacological therapy such as phentermine, orlistat, lorcaserin and phentermine/topiramate ER, contrave ( buproprion/naltreone) and the use of very low calorie meal replacement programs.    If medical intervention is insufficient, surgical therapy may be considered, especially in patients with BMI greater than or equal to 35 kg/m2 with multiple complications. Surgical treatments include lap-band, gastric sleeve or gastric bypass surgery.    I informed the pt that:  1.Weight loss medications can be taken to assist with weight reduction when combined with appropriate healthy lifestyle changes.  2.I discussed possible s/e, risks and benefits of weight loss medications.  3.All medications are FDA approved, however, some may be used ''off label'' for their weight loss benefits and some ''short term'' medications can be used for longer periods to achieve desired clinical outcomes.  4.All patients taking weight loss medications must be seen in clinic for refill  authorization.    Counseling exercise ( V65.41)  Dietary counseling( V65.3) - 6869-6616 calories per day.  Using online program called [employer] (additional fee for online coaching)  Calculated BMI above the upper parameter and f/u plan was documented in the medical record.  Discussed indications, risks and benefits of all medications prescribed, and answered questions to patient's satisfaction.  Contrave not covered  Started Wellbutrin 150 mg bid and Naltrexone 25 mg bid 11/2017.  Has lost 4 lbs since last seen  Continue Wellbutrin 150 mg bid and Naltrexone 25 mg bid.        3.  Prediabetes/IFG.  Currently treated with Metformin 500 mg bid.  Tolerating Metformin well.  Recent glucose 131 (most likely nonfasting).  Increase Metformin to 1500 mg/day, then 2000 mg/day as tolerated.    History of binge eating disorder. States this is not currently controlled.  Previously in the [facility] program but the program hours did not work out with her work schedule.  Recommend she look into the [facility] program - depending on her insurance coverage.  I think it is preferable she work with an eating disorder program but alternately could start by seeing a counselor.    Labs ordered today:   No orders of the defined types were placed in this encounter.    Radiology/Consults ordered today: None    More than 50% of the time spent with Ms. Cantu on counseling / coordinating her care.  Total face to face time was 25 minutes.      Follow-up:  3 months.    Darya Fuentes NP  Endocrinology  [facility]  CC:   ____________________________________________________________      Again, thank you for allowing me to participate in the care of your patient.        Sincerely,        LENORA Javier CNP

## 2018-03-26 NOTE — PROGRESS NOTES
Name: Zenobia Cantu  F/u for obesity (Last seen 11/6/2017).  HPI:  Zeonbia Cantu is a 28 year old female who presents for the evaluation/managment of obestiy  Has tried many different things for weight loss - over the counter weight loss supplements, weight watchers online, food diary/calorie tracking.  History of eating disorder. Binge eating disorder.  Currently not in an eating disorder program.      Menses: menarche age 13, cycles regular, currently on birth control - now periods shorter with lighter flow.  Diarrhea/Constipation:No chronic diarrhea or constipation problems, stress causes intermittent constipation  Changes in Hair or Skin:No  Diabetes:No  Sleep: no problems falling or staying asleep  Sleep Apnea.  On Cpap  Hypertension or CAD: No CAD, + HTN, currently treated with Amlodipine 10 mg qd and HCTZ 25 mg qd..   Vital Signs 9/29/2017 10/3/2017 11/6/2017   Systolic 154 160 142   Diastolic 88 84 76     Hyperlipidemia:No  Hirsutism:No  Easy Brusing:Yes: bruises easily  Use of Steroids:No  Family history of Obesity:No  Diet: portion control  Exercise:Yes: 5 days per week, cardio and weights, [employer], working out up to 1.5 hours per day.  PMH/PSH:  Past Medical History:   Diagnosis Date     Depressive disorder 2010     Essential hypertension 8/28/2017     History reviewed. No pertinent surgical history.  Family Hx:  Family History   Problem Relation Age of Onset     Hypertension Father      He lives a healthy lifestyle     Depression Father      Hypertension Paternal Grandmother      Hypertension Other      Believe my grandmother's mom had problems too     Thyroid disease: No         DM2: Yes: MGM         Autoimmune: DM1, SLE, RA, Vitiligo No    Social Hx:  Social History     Social History     Marital status:      Spouse name: N/A     Number of children: N/A     Years of education: N/A     Occupational History     Not on file.     Social History Main Topics     Smoking status: Never Smoker      Smokeless tobacco: Never Used     Alcohol use Yes      Comment: rarely     Drug use: No     Sexual activity: Yes     Partners: Male     Birth control/ protection: Pill     Other Topics Concern     Parent/Sibling W/ Cabg, Mi Or Angioplasty Before 65f 55m? No     Social History Narrative          MEDICATIONS:  has a current medication list which includes the following prescription(s): norethindrone, hydrochlorothiazide, propranolol, amlodipine, metformin, naltrexone, bupropion, and fluoxetine.    ROS   ROS: 10 point ROS neg other than the symptoms noted above in the HPI.    GENERAL: no fevers, chills, malaise, night sweats.   HEENT: no dysphagia, odonophagia, diplopia, neck pain or tenderness  CV: no chest pain, pressure, palpitations, skipped beats, LOC  LUNGS: no SOB, HELM, cough, sputum production, wheezing   ABDOMEN: no diarrhea, constipation, abdominal pain  EXTREMITIES: no rashes, ulcers, edema  NEUROLOGY: no changes in vision, tingling or numbness in hands or feet.   MSK: no muscle aches or pains, weakness  SKIN: no rashes or lesions  ENDOCRINE: no heat or cold intolerance    Physical Exam   VS: /89 (BP Location: Left arm, Patient Position: Chair, Cuff Size: Adult Large)  Pulse 86  Temp 98.1  F (36.7  C) (Oral)  Resp 16  Wt (!) 173.7 kg (383 lb)  BMI 50.53 kg/m2  GENERAL: AXOX3, NAD, well dressed, answering questions appropriately, appears stated age.  HEENT: OP clear, no LAD, no TM, non-tender, no exopthalmous, no proptosis, EOMI, no lig lag, no retraction  NECK:  Supple, no thyromegaly, no adenopathy  CV: RRR, no rubs, gallops, no murmurs  LUNGS: CTAB, no wheezes, rales, or ronchi  ABDOMEN: soft, nontender, nondistended, no broad striae noted.  EXTREMITIES: no edema, +pulses, no rashes, no lesions  NEUROLOGY: CN grossly intact, no tremors  MSK: grossly intact  SKIN: no acanthosis, skin tags; no rashes, no lesions, no intertrigo    LABS:  !COMPREHENSIVE Latest Ref Rng & Units 12/4/2017   SODIUM 133 -  "144 mmol/L 139   POTASSIUM 3.4 - 5.3 mmol/L 4.2   CHLORIDE 94 - 109 mmol/L 105   BUN 7 - 30 mg/dL 8   Creatinine 0.52 - 1.04 mg/dL 0.75   Glucose 70 - 99 mg/dL 131 (H)   ANION GAP 3 - 14 mmol/L 10   CALCIUM 8.5 - 10.1 mg/dL 8.9     Component    Latest Ref Rng & Units 8/12/2017   Glucose    70 - 99 mg/dL 103 (H)   Insulin   3 - 25 mU/L 21.7   C-Peptide    0.9 - 6.9 ng/mL 3.3   Hemoglobin A1C    4.3 - 6.0 % 5.7     !LIPID/HEPATIC Latest Ref Rng & Units 8/12/2017   CHOLESTEROL <200 mg/dL 128   TRIGLYCERIDES <150 mg/dL 105   HDL CHOLESTEROL >49 mg/dL 42 (L)   LDL CHOLESTEROL, CALCULATED <100 mg/dL 65   NON HDL CHOLESTEROL <130 mg/dL 86     !THYROID Latest Ref Rng & Units 7/25/2017   TSH 0.40 - 4.00 mU/L 1.90     Component    Latest Ref Rng & Units 7/25/2017   WBC    4.0 - 11.0 10e9/L 5.8   RBC Count    3.8 - 5.2 10e12/L 4.37   Hemoglobin    11.7 - 15.7 g/dL 13.3   Hematocrit    35.0 - 47.0 % 40.3     Vital Signs 8/2/2017   Weight (LB) 383 lb 14.4 oz     Vital Signs 9/29/2017 10/3/2017 11/6/2017   Weight (LB) 380 lb 379 lb 387 lb   Height 6' 1.5\"     BMI (Calculated) 49.56       Vital Signs 3/19/2018 3/26/2018   Weight (LB) 382 lb 6.4 oz 383 lb   Height 6' 1\"    BMI (Calculated) 50.56 50.53     Waist Circumference: 63\" (8/2/2017).  62\" (11/6/2017).  62\" (today).    All pertinent notes, labs, and images personally reviewed by me.     A/P  Ms.Amanda Cantu is a 28 year old here for the management of obestiy:    1. Morbid Obesity-  BMI > 50 with comorbidities - HTN, prediabetes, KENY.    Obesity is associated with a significant increase in mortality and risk of many disorders, including diabetes mellitus, hypertension, dyslipidemia, heart disease, stroke, sleep apnea, cancer, and many others. Conversely, weight loss is associated with a reduction in obesity-associated morbidity.    Endocrine evaluation of obesity includes Diabetes/prediabetes, Cushing's and thyroid dysfunction.  The relevant work up for the diagnosis and " management of obesity and various treatment options were discussed. Body Mass Index (BMI) has been a standard means for calculating risk for overweight and obesity. The new American Association of Clinical Endocrinology (AACE) algorithm recommends lifestyle modifications as the initial phase of treatment for all patients with the BMI equal or greater than 25 kg/m2. Lifestyle modifications includes use of medical nutrition therapy, exercise, tobacco cessation, adequate quality and quantity of sleep, limited consumption of alcohol and reduced stress through implementation of a structured, multidisciplinary program.    In patients with complications associated with obesity, graded interventions are recommended including pharmacological therapy such as phentermine, orlistat, lorcaserin and phentermine/topiramate ER, contrave ( buproprion/naltreone) and the use of very low calorie meal replacement programs.    If medical intervention is insufficient, surgical therapy may be considered, especially in patients with BMI greater than or equal to 35 kg/m2 with multiple complications. Surgical treatments include lap-band, gastric sleeve or gastric bypass surgery.    I informed the pt that:  1.Weight loss medications can be taken to assist with weight reduction when combined with appropriate healthy lifestyle changes.  2.I discussed possible s/e, risks and benefits of weight loss medications.  3.All medications are FDA approved, however, some may be used ''off label'' for their weight loss benefits and some ''short term'' medications can be used for longer periods to achieve desired clinical outcomes.  4.All patients taking weight loss medications must be seen in clinic for refill authorization.    Counseling exercise ( V65.41)  Dietary counseling( V65.3) - 1628-9176 calories per day.  Using online program called NOOM (additional fee for online coaching)  Calculated BMI above the upper parameter and f/u plan was documented in  the medical record.  Discussed indications, risks and benefits of all medications prescribed, and answered questions to patient's satisfaction.  Contrave not covered  Started Wellbutrin 150 mg bid and Naltrexone 25 mg bid 11/2017.  Has lost 4 lbs since last seen  Continue Wellbutrin 150 mg bid and Naltrexone 25 mg bid.        3.  Prediabetes/IFG.  Currently treated with Metformin 500 mg bid.  Tolerating Metformin well.  Recent glucose 131 (most likely nonfasting).  Increase Metformin to 1500 mg/day, then 2000 mg/day as tolerated.    History of binge eating disorder. States this is not currently controlled.  Previously in the [facility] program but the program hours did not work out with her work schedule.  Recommend she look into the [facility] program - depending on her insurance coverage.  I think it is preferable she work with an eating disorder program but alternately could start by seeing a counselor.    Labs ordered today:   No orders of the defined types were placed in this encounter.    Radiology/Consults ordered today: None    More than 50% of the time spent with Ms. Cantu on counseling / coordinating her care.  Total face to face time was 25 minutes.      Follow-up:  3 months.    Darya Fuentes NP  Endocrinology  [facility]  CC:   ____________________________________________________________

## 2018-03-26 NOTE — MR AVS SNAPSHOT
After Visit Summary   3/26/2018    Zenobia Cantu    MRN: [id number]           Patient Information     Date Of Birth          1990        Visit Information        Provider Department      3/26/2018 3:30 PM Darya Fuentes APRN [facility]        Today's Diagnoses     Essential hypertension        IFG (impaired fasting glucose)        Morbid obesity (H)        BMI 50.0-59.9, adult (H)        Prediabetes          Care Instructions        Reommended calorie intake 9661-8186 day.  Continue using Noom    Exercise daily if possible.    Short term weight loss goa is 5% of your current weight over the next 3 months = 19 lbs in 3 months, or 6 lbs per month.  Remember, ANY AMOUNT OF WEIGHT LOSS IS SUCCESS!    Online resources:  Eating well. Com  Hungry girl.[employer]strapLabs  [place] Move Program - handouts  Meal planning [employer]    Follow up in 3 months.  Darya Fuentes NP  Endocrinology            Follow-ups after your visit        Who to contact     If you have questions or need follow up information about today's clinic visit or your schedule please contact [facility] directly at [phone].  Normal or non-critical lab and imaging results will be communicated to you by [employer], letter or phone within 4 business days after the clinic has received the results. If you do not hear from us within 7 days, please contact the clinic through [employer] or phone. If you have a critical or abnormal lab result, we will notify you by phone as soon as possible.  Submit refill requests through [employer] or call your pharmacy and they will forward the refill request to us. Please allow 3 business days for your refill to be completed.          Additional Information About Your Visit        [employer] Information     [employer] gives you secure access to your electronic health record. If you see a primary care provider, you can also send messages to your care team and make appointments. If you have  questions, please call your primary care clinic.  If you do not have a primary care provider, please call [phone] and they will assist you.        Care EveryWhere ID     This is your Care EveryWhere ID. This could be used by other organizations to access your [place] medical records  [id number]        Your Vitals Were     Pulse Temperature Respirations BMI (Body Mass Index)          86 98.1  F (36.7  C) (Oral) 16 50.53 kg/m2         Blood Pressure from Last 3 Encounters:   03/26/18 155/89   03/19/18 156/80   12/14/17 128/80    Weight from Last 3 Encounters:   03/26/18 (!) 383 lb (173.7 kg)   03/19/18 (!) 382 lb 6.4 oz (173.5 kg)   12/08/17 (!) 384 lb (174.2 kg)              Today, you had the following     No orders found for display         Today's Medication Changes          These changes are accurate as of 3/26/18  3:52 PM.  If you have any questions, ask your nurse or doctor.               These medicines have changed or have updated prescriptions.        Dose/Directions    metFORMIN 500 MG 24 hr tablet   Commonly known as:  GLUCOPHAGE-XR   This may have changed:  additional instructions   Used for:  IFG (impaired fasting glucose)   Changed by:  Darya Fuentes APRN CNP        TAKE 2 TABLET(500 MG) BY MOUTH TWICE DAILY WITH MEALS   Quantity:  360 tablet   Refills:  3       naltrexone 50 MG tablet   Commonly known as:  DEPADE;REVIA   This may have changed:  additional instructions   Used for:  Morbid obesity (H), BMI 50.0-59.9, adult (H), Prediabetes   Changed by:  Darya Fuentes APRN CNP        1/2 tablet twice daily   Quantity:  90 tablet   Refills:  1            Where to get your medicines      These medications were sent to [facility] [id number] [place] [address] - [facility] [address]  AT [facility] [address] [facility] [address]     Phone:  [phone]     metFORMIN 500 MG 24 hr tablet    naltrexone 50 MG tablet    propranolol 160 MG 24 hr capsule                Primary Care  Provider Office Phone # Fax #    Joe Ramirez PA-C [phone]       [id number]  [facility] [address]        Equal Access to Services     DAVY RAMIREZ : Hadii valerio ku zhaoo Soomaali, waaxda luqadaha, qaybta kaalmada adeegyada, ronel snyder laTristenazalea lemos. So [facility] [place] [facility] [phone].    ATENCIÓN: Si habla español, tiene a prescott disposición servicios gratuitos de asistencia lingüística. Llame al [phone].    We comply with applicable federal civil rights laws and [place] laws. We do not discriminate on the basis of race, color, national origin, age, disability, sex, sexual orientation, or gender identity.            Thank you!     Thank you for choosing [facility]  for your care. Our goal is always to provide you with excellent care. Hearing back from our patients is one way we can continue to improve our services. Please take a few minutes to complete the written survey that you may receive in the mail after your visit with us. Thank you!             Your Updated Medication List - Protect others around you: Learn how to safely use, store and throw away your medicines at www.disposemymeds.org.          This list is accurate as of 3/26/18  3:52 PM.  Always use your most recent med list.                   Brand Name Dispense Instructions for use Diagnosis    amLODIPine 10 MG tablet    NORVASC    90 tablet    Take 1 tablet (10 mg) by mouth daily    Essential hypertension       buPROPion 150 MG 12 hr tablet    WELLBUTRIN SR    60 tablet    Take 1 tablet (150 mg) by mouth 2 times daily    Major depressive disorder, recurrent episode, mild (H), Anxiety, Morbid obesity (H), BMI 50.0-59.9, adult (H)       FLUoxetine 40 MG capsule    PROzac     TK 1 C PO D        hydrochlorothiazide 25 MG tablet    HYDRODIURIL    30 tablet    Take 1 tablet (25 mg) by mouth daily    Essential hypertension       metFORMIN 500 MG 24 hr tablet    GLUCOPHAGE-XR    360 tablet    TAKE 2  TABLET(500 MG) BY MOUTH TWICE DAILY WITH MEALS    IFG (impaired fasting glucose)       naltrexone 50 MG tablet    DEPADE;REVIA    90 tablet    1/2 tablet twice daily    Morbid obesity (H), BMI 50.0-59.9, adult (H), Prediabetes       norethindrone 0.35 MG per tablet    MICRONOR    84 tablet    Take 1 tablet (0.35 mg) by mouth daily    Encounter for surveillance of contraceptive pills       propranolol 160 MG 24 hr capsule    INDERAL LA    90 capsule    TAKE 1 CAPSULE(160 MG) BY MOUTH DAILY    Essential hypertension

## 2018-03-26 NOTE — NURSING NOTE
"Chief Complaint   Patient presents with     Weight Loss     prediabetes       Initial /89 (BP Location: Left arm, Patient Position: Chair, Cuff Size: Adult Large)  Pulse 86  Temp 98.1  F (36.7  C) (Oral)  Resp 16  Wt (!) 383 lb (173.7 kg)  BMI 50.53 kg/m2 Estimated body mass index is 50.53 kg/(m^2) as calculated from the following:    Height as of 3/19/18: 6' 1\" (1.854 m).    Weight as of this encounter: 383 lb (173.7 kg).  Medication Reconciliation: complete    "

## 2018-03-26 NOTE — PATIENT INSTRUCTIONS
Reommended calorie intake 5277-7609 day.  Continue using Noom    Exercise daily if possible.    Short term weight loss goa is 5% of your current weight over the next 3 months = 19 lbs in 3 months, or 6 lbs per month.  Remember, ANY AMOUNT OF WEIGHT LOSS IS SUCCESS!    Online resources:  Eating well. Com  Hungry girl.Pigit  [place] Move Program - handouts  Meal planning [employer].Pigit    Patient to follow up with Primary Care provider regarding elevated blood pressure.    Follow up in 3 months.  Darya Fuentes NP  Endocrinology

## 2018-04-19 ENCOUNTER — ALLIED HEALTH/NURSE VISIT (OUTPATIENT)
Dept: FAMILY MEDICINE | Facility: CLINIC | Age: 28
End: 2018-04-19
Payer: COMMERCIAL

## 2018-04-19 VITALS — SYSTOLIC BLOOD PRESSURE: 150 MMHG | DIASTOLIC BLOOD PRESSURE: 84 MMHG

## 2018-04-19 DIAGNOSIS — I10 ESSENTIAL HYPERTENSION: Primary | ICD-10-CM

## 2018-04-19 PROCEDURE — 99207 ZZC NO CHARGE NURSE ONLY: CPT | Performed by: PHYSICIAN ASSISTANT

## 2018-04-19 NOTE — PROGRESS NOTES
Zenobia Cantu is enrolled/participating in the retail pharmacy Blood Pressure Goals Achievement Program (BPGAP).  Zenobia Cantu was evaluated at [facility] on April 19, 2018 at which time her blood pressure was:    BP Readings from Last 3 Encounters:   04/19/18 150/84   03/26/18 155/89   03/19/18 156/80     Reviewed lifestyle modifications for blood pressure control and reduction: including making healthy food choices, managing weight, getting regular exercise, smoking cessation, reducing alcohol consumption, monitoring blood pressure regularly.     Zenobia Cantu is not experiencing symptoms.    Follow-Up: BP is not at goal of < 140/90mmHg (patient 18+ years of age with or without diabetes), Recommended follow-up in 1 month at the pharmacy. Routing to PCP as an FYI.    Recommendation to Provider: MTM referral    Zenobia Cantu was evaluated for enrollment into the PGEN study today.    Patient eligible for enrollment:  No  Patient interested in enrollment:  No    Completed by: Bebe Epstein

## 2018-04-19 NOTE — MR AVS SNAPSHOT
After Visit Summary   4/19/2018    Zenobia Cantu    MRN: [id number]           Patient Information     Date Of Birth          1990        Visit Information        Provider Department      4/19/2018 3:33 PM Joe Ramirez PA-C [facility]        Today's Diagnoses     Essential hypertension    -  1       Follow-ups after your visit        Additional Services     MED THERAPY MANAGE REFERRAL       Your provider has referred you to: **[facility] Scheduling (numerous locations) [phone]   [email or web address]  FMG: [facility] [place] [facility] [phone]   [email or web address]    Reason for Referral: Uncontrolled [place] [facility]    The [facility] department will contact you to schedule an appointment.  You may also schedule the appointment by calling [phone].  For [facility] patients, please call [phone] to confirm/schedule your appointment on the next business day.    This service is designed to help you get the most from your medications.  A specially trained Pharmacist will work closely with you and your providers to solve any questions, concerns, issues or problems related to your medications.    Please bring all of your prescription and non-prescription medications (such as vitamins, over-the-counter medications, and herbals) or a detailed medication list to your appointment.    If you have a glucose meter or other home monitoring information, please also bring this to your appointment (i.e. blood glucose log, blood pressure log, pain log, etc.).                  Your next 10 appointments already scheduled     Jun 21, 2018  3:30 PM CDT   Return Visit with LENORA Javier CNP   [facility] ([facility])    [address]  [address]  [facility] [id number] [phone]              Who to contact     If you have questions or need follow up information about today's clinic visit or your schedule please contact [facility] directly at [phone].  Normal or non-critical lab and imaging results will be communicated to you by MyChart, letter or phone within 4 business days after the clinic has received the results. If you do not hear from us within 7 days, please contact the clinic through MyChart or phone. If you have a critical or abnormal lab result, we will notify you by phone as soon as possible.  Submit refill requests through LikeAndy or call your pharmacy and they will forward the refill request to us. Please allow 3 business days for your refill to be completed.          Additional Information About Your Visit        CrowdTransferhart Information     LikeAndy gives you secure access to your electronic health record. If you see a primary care provider, you can also send messages to your care team and make appointments. If you have questions, please call your primary care clinic.  If you do not have a primary care provider, please call [phone] and they will assist you.        Care EveryWhere ID     This is your Care EveryWhere ID. This could be used by other organizations to access your [facility] medical records  [id number]         Blood Pressure from Last 3 Encounters:   04/19/18 150/84   03/26/18 155/89   03/19/18 156/80    Weight from Last 3 Encounters:   03/26/18 (!) 383 lb (173.7 kg)   03/19/18 (!) 382 lb 6.4 oz (173.5 kg)   12/08/17 (!) 384 lb (174.2 kg)              We Performed the Following     MED THERAPY MANAGE REFERRAL        Primary Care Provider Office Phone # Fax #    Joe Ramirez PA-C [phone]       [id number]  [address]  [facility] [address]        Equal Access to Services     DAVY RAMIREZ AH: Charli Sorto, wachanningda luqadaha, qaybta kaaltala iyer, ronel mcelroy  denishaana ramirezeda rahelshay lasissyana ah. So [facility] [phone].    ATENCIÓN: Si carlala mimi, tiene a prescott disposición servicios gratuitos de asistencia lingüística. Marcio coleman [phone].    We comply with applicable federal civil rights laws and [place] laws. We do not discriminate on the basis of race, color, national origin, age, disability, sex, sexual orientation, or gender identity.            Thank you!     Thank you for choosing [facility]  for your care. Our goal is always to provide you with excellent care. Hearing back from our patients is one way we can continue to improve our services. Please take a few minutes to complete the written survey that you may receive in the mail after your visit with us. Thank you!             Your Updated Medication List - Protect others around you: Learn how to safely use, store and throw away your medicines at www.disposemymeds.org.          This list is accurate as of 4/19/18  3:37 PM.  Always use your most recent med list.                   Brand Name Dispense Instructions for use Diagnosis    amLODIPine 10 MG tablet    NORVASC    90 tablet    Take 1 tablet (10 mg) by mouth daily    Essential hypertension       buPROPion 150 MG 12 hr tablet    WELLBUTRIN SR    60 tablet    Take 1 tablet (150 mg) by mouth 2 times daily    Major depressive disorder, recurrent episode, mild (H), Anxiety, Morbid obesity (H), BMI 50.0-59.9, adult (H)       FLUoxetine 40 MG capsule    PROzac     TK 1 C PO D        hydrochlorothiazide 25 MG tablet    HYDRODIURIL    30 tablet    Take 1 tablet (25 mg) by mouth daily    Essential hypertension       metFORMIN 500 MG 24 hr tablet    GLUCOPHAGE-XR    360 tablet    TAKE 2 TABLET(500 MG) BY MOUTH TWICE DAILY WITH MEALS    IFG (impaired fasting glucose)       naltrexone 50 MG tablet    DEPADE;REVIA    90 tablet    1/2 tablet twice daily    Morbid obesity (H), BMI 50.0-59.9, adult (H), Prediabetes       norethindrone 0.35 MG per tablet     MICRONOR    84 tablet    Take 1 tablet (0.35 mg) by mouth daily    Encounter for surveillance of contraceptive pills       propranolol 160 MG 24 hr capsule    INDERAL LA    90 capsule    TAKE 1 CAPSULE(160 MG) BY MOUTH DAILY

## 2018-04-23 ENCOUNTER — TELEPHONE (OUTPATIENT)
Dept: PHARMACY | Facility: OTHER | Age: 28
End: 2018-04-23

## 2018-04-23 NOTE — TELEPHONE ENCOUNTER
MTM referral from: [facility]     MTM referral outreach attempt #2 on April 23, 2018 at 1:40 PM      Outcome: Patient not reachable after several attempts, will route to MTM Pharmacist/Provider as an FYI. Thank you for the referral.    Liz Page, MTM Coordinator

## 2018-04-26 ENCOUNTER — OFFICE VISIT (OUTPATIENT)
Dept: PHARMACY | Facility: CLINIC | Age: 28
End: 2018-04-26
Payer: COMMERCIAL

## 2018-04-26 VITALS
DIASTOLIC BLOOD PRESSURE: 83 MMHG | WEIGHT: 293 LBS | BODY MASS INDEX: 50.35 KG/M2 | OXYGEN SATURATION: 95 % | SYSTOLIC BLOOD PRESSURE: 148 MMHG | HEART RATE: 64 BPM

## 2018-04-26 DIAGNOSIS — F41.9 ANXIETY: ICD-10-CM

## 2018-04-26 DIAGNOSIS — I10 ESSENTIAL HYPERTENSION: Primary | ICD-10-CM

## 2018-04-26 DIAGNOSIS — F33.0 MAJOR DEPRESSIVE DISORDER, RECURRENT EPISODE, MILD (H): ICD-10-CM

## 2018-04-26 PROCEDURE — 99207 ZZC NO CHARGE LOS: CPT | Performed by: PHARMACIST

## 2018-04-26 RX ORDER — OLMESARTAN MEDOXOMIL 20 MG/1
20 TABLET ORAL
Qty: 60 TABLET | Refills: 5 | Status: SHIPPED | OUTPATIENT
Start: 2018-04-26 | End: 2018-05-21

## 2018-04-26 RX ORDER — MULTIVIT-MIN/IRON/FOLIC ACID/K 18-600-40
2000 CAPSULE ORAL DAILY
COMMUNITY

## 2018-04-26 NOTE — Clinical Note
Ladies--jeremy-thanks for mtm referral --I added bp med today , will work with her on weight loss as well.  -Kirby

## 2018-04-26 NOTE — PROGRESS NOTES
SUBJECTIVE/OBJECTIVE:                           Zenobia Cantu is a 28 year old female coming in for an initial visit for Medication Therapy Management.  She was referred to me from Joe Ramirez/BP gap program at [facility] .     Chief Complaint: here for discussion on systolic part of BP --cant seem to get it regulated.    .  Personal Healthcare Goals:  Fix BP    Allergies/ADRs: Reviewed in Epic  Tobacco: No tobacco use  Alcohol: not currently using  Caffeine: diet -20ounce sodas/day  Activity:  30 minutes /day on treadmill /elliptical 5 days /week -gym   PMH: Reviewed in Epic; family hx --father has hi BP.      Medication Adherence/Access:  no issues reported    Hypertension: Current medications include amlodipine 10mg. Qafternoon 5-6pm,  hctz 25mg. Qam, , propranolol la 160mg . 5-6 pm .  Patient does not self-monitor BP.  Patient reports the following medication side effects: fatigue-from amlodipine.  Does not add salt to her foods-but is a salt chad!, some fast foods 1 x week, canned soup 1 x week, no sauerkraut , popcorn 1 x 2 weeks.   She has cut out a lot bread and carbs last 2 months . Likes cereal.       Weight loss :   Was taking wellbutrin 150mg bid + naltrexone 50mg 1/2 tab bid --she quit them recently due to feeling like her head was in a cloud --felt way better off them.       Works out 5 days /week .  She does nom -- teaches her how to eat - she is on this now --likes it .     nightime grazer /snacker .  She has c-pap --solid 7.5 hrs. Night .     Anxiety/depression :  40mg prozac pill at 5-6 pm. Since 2010--works well.  Sleeping well 7.5 hrs./night on c-pap.           Current labs include:  Today's Vitals: /83  Pulse 64  Wt (!) 381 lb 9.6 oz (173.1 kg)  SpO2 95%  BMI 50.35 kg/m2  BP Readings from Last 3 Encounters:   04/26/18 148/83   04/19/18 150/84   03/26/18 155/89     Lab Results   Component Value Date    A1C 5.7 08/12/2017   .  Lab Results   Component Value Date    CHOL 128  08/12/2017     Lab Results   Component Value Date    TRIG 105 08/12/2017     Lab Results   Component Value Date    HDL 42 08/12/2017     Lab Results   Component Value Date    LDL 65 08/12/2017       Liver Function Studies - No results for input(s): PROTTOTAL, ALBUMIN, BILITOTAL, ALKPHOS, AST, ALT, BILIDIRECT in the last 49978 hours.    No results found for: UCRR, MICROL, UMALCR    Last Basic Metabolic Panel:  Lab Results   Component Value Date     12/04/2017      Lab Results   Component Value Date    POTASSIUM 4.2 12/04/2017     Lab Results   Component Value Date    CHLORIDE 105 12/04/2017     Lab Results   Component Value Date    BUN 8 12/04/2017     Lab Results   Component Value Date    CR 0.75 12/04/2017     GFR Estimate   Date Value Ref Range Status   12/04/2017 >90 >60 mL/min/1.7m2 Final     Comment:     Non  GFR Calc   07/25/2017 88 >60 mL/min/1.7m2 Final     Comment:     Non  GFR Calc     GFR Estimate If Black   Date Value Ref Range Status   12/04/2017 >90 >60 mL/min/1.7m2 Final     Comment:      GFR Calc   07/25/2017 >90   GFR Calc   >60 mL/min/1.7m2 Final       Most Recent Immunizations   Administered Date(s) Administered     DTAP (<7y) 02/26/1996     Hep B, Peds or Adolescent 05/19/1999     HepA-Adult 08/09/2010     Hpv, Unspecified  03/19/2008     MMR 08/11/2003     Meningococcal (Menactra ) 07/30/2009     Polio, Unspecified  02/26/1996     TDAP Vaccine (Boostrix) 06/25/2012     Varicella 07/16/2007       ASSESSMENT:                             Current medications were reviewed today.     Medication Adherence: excellent, no issues identified    Hypertension: Needs Improvement. Patient is not meeting BP goal of < 140/90mmHg.  Pt would benefit from the following changes - addition of Olmesartan, continued BP monitoring, watching diet, increasing exercise and weight loss and sodium restriction.  See plan for details on spreading around BP  pills.       Weight loss:   Long discussion --stay off contrave due to se's, consider saxenda or ozempic , in lieu of that keep working on low carb diet --I.e.- stop eating cereal , also will try 3 weeks challenge of no food from 6pm to6am. Drink more water when hungry.     Anxiety/depression:  Stable --stay on prozac for now , consider strength training to help with endorphin release as well as bench press when anxiety ridden.     PLAN:                              1.  FYI-- BP today is 148/83mmhg . Pulse 64 .  Goal BP is <140/90mmhg.     Lets try new game plan :    Take HCTZ 25mg tab AM with breakfast   Take propranolol 160mg la pill at 5-6pm  Take Amlodipine 10mg tab at bedtime  Lets ADD in Olmesartan 20mg. Tab -1 with breakfast and dinner daily.  We will recheck your potassium lab at f/up.         Please purchase a new home BP meter with extra large cuff . Bring to next office visit.     Do your best to watch sodium in the diet --limit yourself to 2,300mgs./day of salt.     Drink more water!      2. FYI--Weight today = 381.6lbs   Please donot take any wellbutrin and naltrexone as they gave you funny side effects.     FYI-- lets consider Saxenda or Ozempic to help curb appetite --but for next 3 weeks lets try no drugs for this but no eating between 6pm and 6am .   Drink more water every day!  Drink enough to keep the color of your urine lemonade like.           Next MTM visit:   See me in 3 weeks -- Thursday May 17th at 3;30pm.     I spent 60 minutes with this patient today. All changes were made via collaborative practice agreement with Joe Ramirez. A copy of the visit note was provided to the patient's primary care provider.      The patient was given a summary of these recommendations as an after visit summary.     Kirby Peralta [facility].  Medication Therapy Management Provider  [phone]

## 2018-04-26 NOTE — MR AVS SNAPSHOT
After Visit Summary   4/26/2018    Zenobia Cantu    MRN: [id number]           Patient Information     Date Of Birth          1990        Visit Information        Provider Department      4/26/2018 3:30 PM Kirby Peralta [facility] [place] MTM        Today's Diagnoses     Essential hypertension    -  1      Care Instructions    Recommendations from today's MTM visit:                                                    MTM (medication therapy management) is a service provided by a clinical pharmacist designed to help you get the most of out of your medicines.   Today we reviewed what your medicines are for, how to know if they are working, that your medicines are safe and how to make your medicine regimen as easy as possible.     1.  FYI-- BP today is 148/83mmhg . Pulse 64 .  Goal BP is <140/90mmhg.     Lets try new game plan :    Take HCTZ 25mg tab AM with breakfast   Take propranolol 160mg la pill at 5-6pm  Take Amlodipine 10mg tab at bedtime  Lets ADD in Olmesartan 20mg. Tab -1 with breakfast and dinner daily.  We will recheck your potassium lab at f/up.         Please purchase a new home BP meter with extra large cuff . Bring to next office visit.     Do your best to watch sodium in the diet --limit yourself to 2,300mgs./day of salt.     Drink more water!      2. FYI--Weight today = 381.6lbs   Please donot take any wellbutrin and naltrexone as they gave you funny side effects.     FYI-- lets consider Saxenda or Ozempic to help curb appetite --but for next 3 weeks lets try no drugs for this but no eating between 6pm and 6am .   Drink more water every day!  Drink enough to keep the color of your urine lemonade like.           Next MTM visit:   See me in 3 weeks -- Thursday May 17th at 3;30pm.     To schedule another MTM appointment, please call the clinic directly or you may call the MTM scheduling line at [phone] or toll-free at [phone].     My Clinical Pharmacist's  contact information:                                                      It was a pleasure seeing you today!  Please feel free to contact me with any questions or concerns you have.      Kirby Peralta [facility].  Medication Therapy Management Provider  [phone]      You may receive a survey about the [facility] services you received.  I would appreciate your feedback to help me serve you better in the future. Please fill it out and return it when you can. Your comments will be anonymous.                    Follow-ups after your visit        Your next 10 appointments already scheduled     May 17, 2018  3:30 PM CDT   SHORT with Kirby Peralta RPH   [facility]    [address] [facility] [id number] [phone]            Jun 21, 2018  3:30 PM CDT   Return Visit with LENORA Javier [facility] ([facility])    [address] [facility] [address] [phone]              Who to contact     If you have questions or need follow up information about today's clinic visit or your schedule please contact [facility] directly at [phone].  Normal or non-critical lab and imaging results will be communicated to you by MyChart, letter or phone within 4 business days after the clinic has received the results. If you do not hear from us within 7 days, please contact the clinic through MyChart or phone. If you have a critical or abnormal lab result, we will notify you by phone as soon as possible.  Submit refill requests through SPHARES or call your pharmacy and they will forward the refill request to us. Please allow 3 business days for your refill to be completed.          Additional Information About Your Visit        Voicebasehart Information     SPHARES gives you secure access to your electronic health record. If you see a primary care provider, you can also send messages to your care  team and make appointments. If you have questions, please call your primary care clinic.  If you do not have a primary care provider, please call [phone] and they will assist you.        Care EveryWhere ID     This is your Care EveryWhere ID. This could be used by other organizations to access your [facility] medical records  [id number]        Your Vitals Were     Pulse Pulse Oximetry BMI (Body Mass Index)             64 95% 50.35 kg/m2          Blood Pressure from Last 3 Encounters:   04/26/18 148/83   04/19/18 150/84   03/26/18 155/89    Weight from Last 3 Encounters:   04/26/18 (!) 381 lb 9.6 oz (173.1 kg)   03/26/18 (!) 383 lb (173.7 kg)   03/19/18 (!) 382 lb 6.4 oz (173.5 kg)              Today, you had the following     No orders found for display         Today's Medication Changes          These changes are accurate as of 4/26/18  4:38 PM.  If you have any questions, ask your nurse or doctor.               Start taking these medicines.        Dose/Directions    olmesartan 20 MG tablet   Commonly known as:  BENICAR   Used for:  Essential hypertension        Dose:  20 mg   Take 1 tablet (20 mg) by mouth 2 times daily   Quantity:  60 tablet   Refills:  5         These medicines have changed or have updated prescriptions.        Dose/Directions    metFORMIN 500 MG 24 hr tablet   Commonly known as:  GLUCOPHAGE-XR   This may have changed:    - how much to take  - how to take this  - when to take this  - additional instructions   Used for:  IFG (impaired fasting glucose)        TAKE 2 TABLET(500 MG) BY MOUTH TWICE DAILY WITH MEALS   Quantity:  360 tablet   Refills:  3         Stop taking these medicines if you haven't already. Please contact your care team if you have questions.     buPROPion 150 MG 12 hr tablet   Commonly known as:  WELLBUTRIN SR           naltrexone 50 MG tablet   Commonly known as:  DEPADE;REVIA                Where to get your medicines      These medications were sent to [facility] - [place], [place] - [address] AT [facility] [address]  [address] , [facility] [address]     Phone:  [phone]     olmesartan 20 MG tablet                Primary Care Provider Office Phone # Fax #    Joe Ramirez PA-C [phone]       [address]  [facility] [address]        Equal Access to Services     DAVY RAMIREZ AH: Hadii aad ku hadasho Soomaali, waaxda luqadaha, qaybta kaalmada adeegyada, waxay idiin hayaan adeeg kharash la'azalea ah. So [facility] [phone].    ATENCIÓN: Si habla español, tiene a prescott disposición servicios gratuitos de asistencia lingüística. Llame al [phone].    We comply with applicable federal civil rights laws and [place] laws. We do not discriminate on the basis of race, color, national origin, age, disability, sex, sexual orientation, or gender identity.            Thank you!     Thank you for choosing [facility]  for your care. Our goal is always to provide you with excellent care. Hearing back from our patients is one way we can continue to improve our services. Please take a few minutes to complete the written survey that you may receive in the mail after your visit with us. Thank you!             Your Updated Medication List - Protect others around you: Learn how to safely use, store and throw away your medicines at www.disposemymeds.org.          This list is accurate as of 4/26/18  4:38 PM.  Always use your most recent med list.                   Brand Name Dispense Instructions for use Diagnosis    amLODIPine 10 MG tablet    NORVASC    90 tablet    Take 1 tablet (10 mg) by mouth daily    Essential hypertension       FLUoxetine 40 MG capsule    PROzac     TK 1 C PO D        hydrochlorothiazide 25 MG tablet    HYDRODIURIL    30 tablet    Take 1 tablet (25 mg) by mouth daily    Essential hypertension       metFORMIN 500 MG 24 hr tablet    GLUCOPHAGE-XR    360 tablet    TAKE 2 TABLET(500 MG) BY MOUTH TWICE DAILY WITH  MEALS    IFG (impaired fasting glucose)       norethindrone 0.35 MG per tablet    MICRONOR    84 tablet    Take 1 tablet (0.35 mg) by mouth daily    Encounter for surveillance of contraceptive pills       olmesartan 20 MG tablet    BENICAR    60 tablet    Take 1 tablet (20 mg) by mouth 2 times daily    Essential hypertension       propranolol 160 MG 24 hr capsule    INDERAL LA    90 capsule    TAKE 1 CAPSULE(160 MG) BY MOUTH DAILY        vitamin D 2000 units Caps      Take 2,000 Units by mouth daily

## 2018-04-26 NOTE — PATIENT INSTRUCTIONS
Recommendations from today's MTM visit:                                                    MTM (medication therapy management) is a service provided by a clinical pharmacist designed to help you get the most of out of your medicines.   Today we reviewed what your medicines are for, how to know if they are working, that your medicines are safe and how to make your medicine regimen as easy as possible.     1.  FYI-- BP today is 148/83mmhg . Pulse 64 .  Goal BP is <140/90mmhg.     Lets try new game plan :    Take HCTZ 25mg tab AM with breakfast   Take propranolol 160mg la pill at 5-6pm  Take Amlodipine 10mg tab at bedtime  Lets ADD in Olmesartan 20mg. Tab -1 with breakfast and dinner daily.  We will recheck your potassium lab at f/up.         Please purchase a new home BP meter with extra large cuff . Bring to next office visit.     Do your best to watch sodium in the diet --limit yourself to 2,300mgs./day of salt.     Drink more water!      2. FYI--Weight today = 381.6lbs   Please donot take any wellbutrin and naltrexone as they gave you funny side effects.     FYI-- lets consider Saxenda or Ozempic to help curb appetite --but for next 3 weeks lets try no drugs for this but no eating between 6pm and 6am .   Drink more water every day!  Drink enough to keep the color of your urine lemonade like.           Next MTM visit:   See me in 3 weeks -- Thursday May 17th at 3;30pm.     To schedule another MTM appointment, please call the clinic directly or you may call the MTM scheduling line at [phone] or toll-free at [phone].     My Clinical Pharmacist's contact information:                                                      It was a pleasure seeing you today!  Please feel free to contact me with any questions or concerns you have.      Kirby Peralta [facility].  Medication Therapy Management Provider  [phone]      You may receive a survey about the MTM services you received.  I would appreciate your feedback to  help me serve you better in the future. Please fill it out and return it when you can. Your comments will be anonymous.

## 2018-05-21 ENCOUNTER — ALLIED HEALTH/NURSE VISIT (OUTPATIENT)
Dept: PHARMACY | Facility: CLINIC | Age: 28
End: 2018-05-21
Payer: COMMERCIAL

## 2018-05-21 VITALS
WEIGHT: 293 LBS | DIASTOLIC BLOOD PRESSURE: 78 MMHG | BODY MASS INDEX: 50.4 KG/M2 | HEART RATE: 73 BPM | OXYGEN SATURATION: 97 % | SYSTOLIC BLOOD PRESSURE: 142 MMHG

## 2018-05-21 DIAGNOSIS — F41.9 ANXIETY: ICD-10-CM

## 2018-05-21 DIAGNOSIS — I10 ESSENTIAL HYPERTENSION: Primary | ICD-10-CM

## 2018-05-21 DIAGNOSIS — F33.0 MAJOR DEPRESSIVE DISORDER, RECURRENT EPISODE, MILD (H): ICD-10-CM

## 2018-05-21 DIAGNOSIS — I10 ESSENTIAL HYPERTENSION: ICD-10-CM

## 2018-05-21 PROCEDURE — 99207 ZZC NO CHARGE LOS: CPT | Performed by: PHARMACIST

## 2018-05-21 PROCEDURE — 36415 COLL VENOUS BLD VENIPUNCTURE: CPT | Performed by: PHYSICIAN ASSISTANT

## 2018-05-21 PROCEDURE — 80048 BASIC METABOLIC PNL TOTAL CA: CPT | Performed by: PHYSICIAN ASSISTANT

## 2018-05-21 RX ORDER — SPIRONOLACTONE 25 MG/1
25 TABLET ORAL DAILY
Qty: 30 TABLET | Refills: 1 | Status: SHIPPED | OUTPATIENT
Start: 2018-05-21 | End: 2018-09-21

## 2018-05-21 RX ORDER — OLMESARTAN MEDOXOMIL 20 MG/1
20 TABLET ORAL
Qty: 180 TABLET | Refills: 1 | Status: SHIPPED | OUTPATIENT
Start: 2018-05-21 | End: 2019-02-05

## 2018-05-21 NOTE — Clinical Note
Joe --jeremy--added some spironolactone to see if we can finally get her Bp to goal? Stay tuned -thanks -andriy/Zeynep

## 2018-05-21 NOTE — MR AVS SNAPSHOT
After Visit Summary   5/21/2018    Zenobia Cantu    MRN: [id number]           Patient Information     Date Of Birth          1990        Visit Information        Provider Department      5/21/2018 3:30 PM Kirby Peralta RPH [facility]        Today's Diagnoses     Essential hypertension    -  1      Care Instructions    Recommendations from today's [facility] visit:                                                        1.  Continue to take hydrochlorothiazide 25 mg in the morning with breakfast, propranolol 160 mg la at 5-6 pm, amlodipine 10 mg at bedtime, and olmesartan 20 mg with breakfast and dinner daily. We will check potassium level today.     2.  Let's add spironolactone 25 mg tablet. Take 12.5 mg daily (one-half tablet) by mouth in the morning.     3.  FYI - clinic blood pressure today was 142/ 78 (goal bp less than 140/90 at home and in clinic)        Next MTM visit: Monday June 18 at 330 pm    Please bring home blood pressure monitor to next appointment!    To schedule another [facility] appointment, please call the clinic directly or you may call the [facility] scheduling line at [phone] or toll-free at [phone].     My Clinical Pharmacist's contact information:                                                      It was a pleasure seeing you today!  Please feel free to contact me with any questions or concerns you have.      Kirby Peralta Rph.  Medication Therapy Management Provider  [phone]      You may receive a survey about the [facility] services you received.  I would appreciate your feedback to help me serve you better in the future. Please fill it out and return it when you can. Your comments will be anonymous.                    Follow-ups after your visit        Your next 10 appointments already scheduled     Jun 18, 2018  3:30 PM CDT   SHORT with Kirby [facility]eralta RPH   [facility]    [address] [address]  [place] [address]   [phone]              Future tests that were ordered for you today     Open Future Orders        Priority Expected Expires Ordered    Basic metabolic panel Routine  11/21/2018 5/21/2018            Who to contact     If you have questions or need follow up information about today's clinic visit or your schedule please contact [facility] MTM directly at [phone].  Normal or non-critical lab and imaging results will be communicated to you by [employer], letter or phone within 4 business days after the clinic has received the results. If you do not hear from us within 7 days, please contact the clinic through [employer] or phone. If you have a critical or abnormal lab result, we will notify you by phone as soon as possible.  Submit refill requests through [employer] or call your pharmacy and they will forward the refill request to us. Please allow 3 business days for your refill to be completed.          Additional Information About Your Visit        [employer] Information     [employer] gives you secure access to your electronic health record. If you see a primary care provider, you can also send messages to your care team and make appointments. If you have questions, please call your primary care clinic.  If you do not have a primary care provider, please call [phone] and they will assist you.        Care EveryWhere ID     This is your Care EveryWhere ID. This could be used by other organizations to access your [facility] medical records  [id number]        Your Vitals Were     Pulse Pulse Oximetry BMI (Body Mass Index)             73 97% 50.4 kg/m2          Blood Pressure from Last 3 Encounters:   05/21/18 142/78   04/26/18 148/83   04/19/18 150/84    Weight from Last 3 Encounters:   05/21/18 (!) 382 lb (173.3 kg)   04/26/18 (!) 381 lb 9.6 oz (173.1 kg)   03/26/18 (!) 383 lb (173.7 kg)                 Today's Medication Changes          These changes are accurate as of 5/21/18  4:03 PM.   If you have any questions, ask your nurse or doctor.               These medicines have changed or have updated prescriptions.        Dose/Directions    metFORMIN 500 MG 24 hr tablet   Commonly known as:  GLUCOPHAGE-XR   This may have changed:    - how much to take  - how to take this  - when to take this  - additional instructions   Used for:  IFG (impaired fasting glucose)        TAKE 2 TABLET(500 MG) BY MOUTH TWICE DAILY WITH MEALS   Quantity:  360 tablet   Refills:  3                Primary Care Provider Office Phone # Fax #    Joe Ramirez PA-C [phone]  [facility] [address]        Equal Access to Services     [facility]: Hadii valerio ku hadasho Soomaali, waaxda luqadaha, qaybta kaalmada adeegyada, ronel kerr . So [facility] [phone].    ATENCIÓN: Si habla español, tiene a prescott disposición servicios gratuitos de asistencia lingüística. [facility] [phone].    We comply with applicable federal civil rights laws and [place] laws. We do not discriminate on the basis of race, color, national origin, age, disability, sex, sexual orientation, or gender identity.            Thank you!     Thank you for choosing [facility]  for your care. Our goal is always to provide you with excellent care. Hearing back from our patients is one way we can continue to improve our services. Please take a few minutes to complete the written survey that you may receive in the mail after your visit with us. Thank you!             Your Updated Medication List - Protect others around you: Learn how to safely use, store and throw away your medicines at www.disposemymeds.org.          This list is accurate as of 5/21/18  4:03 PM.  Always use your most recent med list.                   Brand Name Dispense Instructions for use Diagnosis    amLODIPine 10 MG tablet    NORVASC    90 tablet    Take 1 tablet (10 mg) by mouth daily    Essential  hypertension       FLUoxetine 40 MG capsule    PROzac     TK 1 C PO D        hydrochlorothiazide 25 MG tablet    HYDRODIURIL    30 tablet    Take 1 tablet (25 mg) by mouth daily    Essential hypertension       metFORMIN 500 MG 24 hr tablet    GLUCOPHAGE-XR    360 tablet    TAKE 2 TABLET(500 MG) BY MOUTH TWICE DAILY WITH MEALS    IFG (impaired fasting glucose)       norethindrone 0.35 MG per tablet    MICRONOR    84 tablet    Take 1 tablet (0.35 mg) by mouth daily    Encounter for surveillance of contraceptive pills       olmesartan 20 MG tablet    BENICAR    60 tablet    Take 1 tablet (20 mg) by mouth 2 times daily    Essential hypertension       propranolol 160 MG 24 hr capsule    INDERAL LA    90 capsule    TAKE 1 CAPSULE(160 MG) BY MOUTH DAILY        vitamin D 2000 units Caps      Take 2,000 Units by mouth daily

## 2018-05-21 NOTE — PROGRESS NOTES
SUBJECTIVE/OBJECTIVE:                           Zenobia Cantu is a 28 year old female coming in for an f/up (4-26-18) visit for Medication Therapy Management.  She was referred to me from Joe Ramirez/BP gap program at [facility] .     Chief Complaint:  Bought a new BP meter --tolerating all her new BP med.  .  Personal Healthcare Goals:  Fix BP    Allergies/ADRs: Reviewed in Epic  Tobacco: No tobacco use  Alcohol: not currently using  Caffeine: now drinks about a couple of sodas each week (previously [id number] sodas/day)  Activity:  30 minutes /day on treadmill /elliptical 5 days /week -gym hydri  PMH: Reviewed in Epic; family hx --father has hi BP.      Medication Adherence/Access:  no issues reported    Hypertension: Current medications include amlodipine 10mg. Hs., hctz 25mg. Qam, , propranolol la 160mg . 5-6 pm , olmesartan 20mg bid .  Patient does self-monitor BP. Pt. Self reports home BP's:  Daily - 140-142/80mmhg.        Patient reports the following medication side effects: fatigue-from amlodipine.  Does not add salt to her foods-but is a salt chad!  Stopped eating cereal !    Weight loss :   Off cereal , banana w/pbutter for b-fast .  Was taking wellbutrin 150mg bid + naltrexone 50mg 1/2 tab bid --she quit them recently due to feeling like her head was in a cloud --felt way better off them.       Works out 5 days /week .  She does nom -- teaches her how to eat - she is on this now --likes it .     nightime grazer /snacker .  She has c-pap --solid 7.5 hrs. Night .     She is following my 12 hour fasting idea --eat 6pm to 6 am . Water fast only 6pm to 6am.     Anxiety/depression :  40mg prozac pill at 5-6 pm. Since 2010--works well.  Sleeping well 7.5 hrs./night on c-pap.           Current labs include:  Today's Vitals: /78  Pulse 73  Wt (!) 382 lb (173.3 kg)  SpO2 97%  BMI 50.4 kg/m2  BP Readings from Last 3 Encounters:   05/21/18 142/78   04/26/18 148/83   04/19/18 150/84     Lab Results    Component Value Date    A1C 5.7 08/12/2017   .  Lab Results   Component Value Date    CHOL 128 08/12/2017     Lab Results   Component Value Date    TRIG 105 08/12/2017     Lab Results   Component Value Date    HDL 42 08/12/2017     Lab Results   Component Value Date    LDL 65 08/12/2017       Liver Function Studies - No results for input(s): PROTTOTAL, ALBUMIN, BILITOTAL, ALKPHOS, AST, ALT, BILIDIRECT in the last 89280 hours.    No results found for: UCRR, MICROL, UMALCR    Last Basic Metabolic Panel:  Lab Results   Component Value Date     12/04/2017      Lab Results   Component Value Date    POTASSIUM 4.2 12/04/2017     Lab Results   Component Value Date    CHLORIDE 105 12/04/2017     Lab Results   Component Value Date    BUN 8 12/04/2017     Lab Results   Component Value Date    CR 0.75 12/04/2017     GFR Estimate   Date Value Ref Range Status   12/04/2017 >90 >60 mL/min/1.7m2 Final     Comment:     Non  GFR Calc   07/25/2017 88 >60 mL/min/1.7m2 Final     Comment:     Non  GFR Calc     GFR Estimate If Black   Date Value Ref Range Status   12/04/2017 >90 >60 mL/min/1.7m2 Final     Comment:      GFR Calc   07/25/2017 >90   GFR Calc   >60 mL/min/1.7m2 Final       Most Recent Immunizations   Administered Date(s) Administered     DTAP (<7y) 02/26/1996     Hep B, Peds or Adolescent 05/19/1999     HepA-Adult 08/09/2010     Hpv, Unspecified  03/19/2008     MMR 08/11/2003     Meningococcal (Menactra ) 07/30/2009     Polio, Unspecified  02/26/1996     TDAP Vaccine (Boostrix) 06/25/2012     Varicella 07/16/2007       ASSESSMENT:                             Current medications were reviewed today.     Medication Adherence: excellent, no issues identified    Hypertension: Needs Improvement. Patient is not meeting BP goal of < 140/90mmHg.  Pt would benefit from the following changes - addition of spironolactone-see plan for details, continued BP  monitoring, watching diet, increasing exercise and weight loss and sodium restriction.  See plan for details on spreading around BP pills.       Weight loss:  Improving with non drug regimen but may need a glp-1 med or topiramate ?  Discuss at next OV if BP wnl.     Anxiety/depression:  Stable --stay on prozac for now , consider strength training to help with endorphin release as well as bench press when anxiety ridden.     PLAN:                              1.  Continue to take hydrochlorothiazide 25 mg in the morning with breakfast, propranolol 160 mg la at 5-6 pm, amlodipine 10 mg at bedtime, and olmesartan 20 mg with breakfast and dinner daily. We will check potassium level today.     2.  Let's add spironolactone 25 mg tablet. Take 12.5 mg daily (one-half tablet) by mouth in the morning.     3.  FYI - clinic blood pressure today was 142/ 78 (goal bp less than 140/90 at home and in clinic)        Next MTM visit: Monday June 18 at 330 pm    Please bring home blood pressure monitor to next appointment!    I spent 30 minutes with this patient today. All changes were made via collaborative practice agreement with Joe Ramirez. A copy of the visit note was provided to the patient's primary care provider.      The patient was given a summary of these recommendations as an after visit summary.     Kirby Peralta [facility].  Medication Therapy Management Provider  [phone]

## 2018-05-21 NOTE — PATIENT INSTRUCTIONS
Recommendations from today's MTM visit:                                                        1.  Continue to take hydrochlorothiazide 25 mg in the morning with breakfast, propranolol 160 mg la at 5-6 pm, amlodipine 10 mg at bedtime, and olmesartan 20 mg with breakfast and dinner daily. We will check potassium level today.     2.  Let's add spironolactone 25 mg tablet. Take 12.5 mg daily (one-half tablet) by mouth in the morning.     3.  FYI - clinic blood pressure today was 142/ 78 (goal bp less than 140/90 at home and in clinic)        Next MTM visit: Monday June 18 at 330 pm    Please bring home blood pressure monitor to next appointment!    To schedule another MTM appointment, please call the clinic directly or you may call the MTM scheduling line at [phone] or toll-free at [phone].     My Clinical Pharmacist's contact information:                                                      It was a pleasure seeing you today!  Please feel free to contact me with any questions or concerns you have.      Kirby Peralta [facility].  Medication Therapy Management Provider  [phone]      You may receive a survey about the MTM services you received.  I would appreciate your feedback to help me serve you better in the future. Please fill it out and return it when you can. Your comments will be anonymous.

## 2018-05-22 LAB
ANION GAP SERPL CALCULATED.3IONS-SCNC: 7 MMOL/L (ref 3–14)
BUN SERPL-MCNC: 10 MG/DL (ref 7–30)
CALCIUM SERPL-MCNC: 8.8 MG/DL (ref 8.5–10.1)
CHLORIDE SERPL-SCNC: 108 MMOL/L (ref 94–109)
CO2 SERPL-SCNC: 24 MMOL/L (ref 20–32)
CREAT SERPL-MCNC: 0.77 MG/DL (ref 0.52–1.04)
GFR SERPL CREATININE-BSD FRML MDRD: >90 ML/MIN/1.7M2
GLUCOSE SERPL-MCNC: 107 MG/DL (ref 70–99)
POTASSIUM SERPL-SCNC: 4.4 MMOL/L (ref 3.4–5.3)
SODIUM SERPL-SCNC: 139 MMOL/L (ref 133–144)

## 2018-06-15 ENCOUNTER — MYC MEDICAL ADVICE (OUTPATIENT)
Dept: PHARMACY | Facility: CLINIC | Age: 28
End: 2018-06-15

## 2018-06-15 ENCOUNTER — TELEPHONE (OUTPATIENT)
Dept: FAMILY MEDICINE | Facility: CLINIC | Age: 28
End: 2018-06-15

## 2018-06-15 NOTE — TELEPHONE ENCOUNTER
Panel Management Review      Patient has the following on her problem list:     Depression / Dysthymia review    Measure:  Needs PHQ-9 score of 4 or less during index window.  Administer PHQ-9 and if score is 5 or more, send encounter to provider for next steps.    5 - 7 month window range:     PHQ-9 SCORE 7/25/2017 7/31/2017 3/19/2018   Total Score 2 3 0       If PHQ-9 recheck is 5 or more, route to provider for next steps.    Patient is due for:  None    Hypertension   Last three blood pressure readings:  BP Readings from Last 3 Encounters:   05/21/18 142/78   04/26/18 148/83   04/19/18 150/84     Blood pressure: FAILED    HTN Guidelines:  Age 18-59 BP range:  Less than 140/90  Age 60-85 with Diabetes:  Less than 140/90  Age 60-85 without Diabetes:  less than 150/90      Composite cancer screening  Chart review shows that this patient is due/due soon for the following None  Summary:    Patient is due/failing the following:   BP CHECK    Action needed:   Patient needs nurse only appointment.    Type of outreach:    NONE, patient is being followed by MTM at [facility].    Questions for provider review:    None                                                                                                                                    Olivia Kelly MA     Encounter closed.

## 2018-06-15 NOTE — TELEPHONE ENCOUNTER
mtm notes pt. Didn't have bp checked at our phcy yet but can wait until she see's me next Monday .    Kirby Peralta [facility].  Medication Therapy Management Provider  [phone]

## 2018-06-18 ENCOUNTER — OFFICE VISIT (OUTPATIENT)
Dept: PHARMACY | Facility: CLINIC | Age: 28
End: 2018-06-18
Payer: COMMERCIAL

## 2018-06-18 VITALS
HEART RATE: 76 BPM | SYSTOLIC BLOOD PRESSURE: 128 MMHG | DIASTOLIC BLOOD PRESSURE: 72 MMHG | BODY MASS INDEX: 49.66 KG/M2 | OXYGEN SATURATION: 98 % | WEIGHT: 293 LBS

## 2018-06-18 DIAGNOSIS — I10 ESSENTIAL HYPERTENSION: ICD-10-CM

## 2018-06-18 DIAGNOSIS — R73.03 PREDIABETES: ICD-10-CM

## 2018-06-18 DIAGNOSIS — F33.0 MAJOR DEPRESSIVE DISORDER, RECURRENT EPISODE, MILD (H): ICD-10-CM

## 2018-06-18 DIAGNOSIS — F41.9 ANXIETY: ICD-10-CM

## 2018-06-18 DIAGNOSIS — I10 ESSENTIAL HYPERTENSION: Primary | ICD-10-CM

## 2018-06-18 DIAGNOSIS — Z30.9 ENCOUNTER FOR CONTRACEPTIVE MANAGEMENT, UNSPECIFIED TYPE: ICD-10-CM

## 2018-06-18 LAB — HBA1C MFR BLD: 5.9 % (ref 0–5.6)

## 2018-06-18 PROCEDURE — 36415 COLL VENOUS BLD VENIPUNCTURE: CPT | Performed by: PHYSICIAN ASSISTANT

## 2018-06-18 PROCEDURE — 83036 HEMOGLOBIN GLYCOSYLATED A1C: CPT | Performed by: PHYSICIAN ASSISTANT

## 2018-06-18 PROCEDURE — 99207 ZZC NO CHARGE LOS: CPT | Performed by: PHARMACIST

## 2018-06-18 PROCEDURE — 80048 BASIC METABOLIC PNL TOTAL CA: CPT | Performed by: PHYSICIAN ASSISTANT

## 2018-06-18 RX ORDER — PROPRANOLOL HYDROCHLORIDE 80 MG/1
80 CAPSULE, EXTENDED RELEASE ORAL DAILY
Qty: 30 CAPSULE | Refills: 1 | Status: SHIPPED | OUTPATIENT
Start: 2018-06-18 | End: 2018-07-23

## 2018-06-18 NOTE — PROGRESS NOTES
"SUBJECTIVE/OBJECTIVE:                           Zenobia Cantu is a 28 year old female coming in for an f/up (5-21-18) visit for Medication Therapy Management.  She was referred to me from Joe Ramirez/BP gap program at [facility] .     Chief Complaint:  Here for BP recheck post spironolactone  Start . Recheck K+ lab.  Patient really expressed to MTM and Pharm.D. today she really like to be off of all medications!  .  Personal Healthcare Goals:  Fix BP, \"get rid of all medications\"    Allergies/ADRs: Reviewed in Epic  Tobacco: No tobacco use  Alcohol: not currently using  Caffeine: now drinks about a couple of sodas each week (previously [id number] sodas/day)  Activity:  30 minutes /day on treadmill /elliptical 5 days /week -gym hydri  PMH: Reviewed in Epic; family hx --father has hi BP.      Medication Adherence/Access:  no issues reported    Hypertension: Current medications include: 12.5mg spironolactone every morning,  amlodipine 10mg. Hs., hctz 25mg. Qam, , propranolol la 160mg . 5-6 pm , olmesartan 20mg bid .  Patient does self-monitor BP. Pt. Self reports home BP's:she bought her BP meter from home today - result in clinic 131/79. She reports home bp have been 120-129/70-80    Weight loss/ prediabetes: Current therapy is metformin 500 mg BID. A1C today is 5.9%, which increased from 5.7% last august. She lost 6 pounds since 5/21/18. She has decreased the amount of food she eats at lunch. She would \"love to feel more full.\" She is motivated to lose weight to decrease the number of medications she takes.      Pt was taking wellbutrin 150mg bid + naltrexone 50mg 1/2 tab bid --she quit them recently due to feeling like her head was in a cloud     nightime grazer /snacker .  She has c-pap --solid 7.5 hrs. Night .     She is following my 12 hour fasting idea --eat 6pm to 6 am . Water fast only 6pm to 6am.  MTM/Pharm.D. discussed    Alternative weight loss drug Ozempic with patient today-she is very interested in " trying this drug if covered by her insurance or affordable?    Contraception/ Family Planning: Current therapy is norethindrone 0.35 mg daily. She is concerned about her weight and taking an oral contraceptive. She does not like that she has to worry about an accidental pregnancy since she knows the norethindrone only pill is not as effective. Her periods are inconsistent, which is bothersome. She has no history of migraine with aura and no family history of clot, MI, or stroke. Pt is not planning on having children for several years, but she is motivated to lose weight and get rid of medications to prepare for this. She is interested in an IUD, and is wondering if this may be a good option.    Anxiety/depression :  Current therapy is 40mg prozac at 5-6 pm. Since 2010--works well.  Sleeping well 7.5 hrs./night on c-pap.    Current labs include:  Today's Vitals: /72  Pulse 76  Wt (!) 376 lb 6.4 oz (170.7 kg)  SpO2 98%  BMI 49.66 kg/m2  BP Readings from Last 3 Encounters:   06/18/18 128/72   05/21/18 142/78   04/26/18 148/83     Lab Results   Component Value Date    A1C 5.9 06/18/2018   .  Lab Results   Component Value Date    CHOL 128 08/12/2017     Lab Results   Component Value Date    TRIG 105 08/12/2017     Lab Results   Component Value Date    HDL 42 08/12/2017     Lab Results   Component Value Date    LDL 65 08/12/2017       Liver Function Studies - No results for input(s): PROTTOTAL, ALBUMIN, BILITOTAL, ALKPHOS, AST, ALT, BILIDIRECT in the last 41264 hours.    No results found for: UCRR, MICROL, UMALCR    Last Basic Metabolic Panel:  Lab Results   Component Value Date     05/21/2018      Lab Results   Component Value Date    POTASSIUM 4.4 05/21/2018     Lab Results   Component Value Date    CHLORIDE 108 05/21/2018     Lab Results   Component Value Date    BUN 10 05/21/2018     Lab Results   Component Value Date    CR 0.77 05/21/2018     GFR Estimate   Date Value Ref Range Status   05/21/2018 >90  >60 mL/min/1.7m2 Final     Comment:     Non  GFR Calc   12/04/2017 >90 >60 mL/min/1.7m2 Final     Comment:     Non  GFR Calc   07/25/2017 88 >60 mL/min/1.7m2 Final     Comment:     Non  GFR Calc     GFR Estimate If Black   Date Value Ref Range Status   05/21/2018 >90 >60 mL/min/1.7m2 Final     Comment:      GFR Calc   12/04/2017 >90 >60 mL/min/1.7m2 Final     Comment:      GFR Calc   07/25/2017 >90   GFR Calc   >60 mL/min/1.7m2 Final       Most Recent Immunizations   Administered Date(s) Administered     DTAP (<7y) 02/26/1996     Hep B, Peds or Adolescent 05/19/1999     HepA-Adult 08/09/2010     Hpv, Unspecified  03/19/2008     MMR 08/11/2003     Meningococcal (Menactra ) 07/30/2009     Polio, Unspecified  02/26/1996     TDAP Vaccine (Boostrix) 06/25/2012     Varicella 07/16/2007         ASSESSMENT:                             Current medications were reviewed today.     Medication Adherence: excellent, no issues identified    Hypertension: Stable. Patient is meeting BP goal of < 140/90mmHg.   Pt would benefit from the following changes - decreasing the dose of propranolol, with a goal of discontinuation. Propranolol may not have a significant impact on blood pressure and pt would like to minimize medications. A dose increase of spironolactone can be considered if an increase in blood pressure is seen. BMP lab pending to assess for spironolactone safety-with regards to her potassium level.    Weight loss/ prediabetes: Needs improvement-Pt would benefit from drug therapy to help feel full, thus decreasing food intake. Ozempic has the potential to help with blood sugar control and weight loss.  MTM/Pharm.D. signed patient up for new weight loss drug with $25 per month co-pay card be a drug rep today, Pharm.D. Pharm.D. successfully educated and taught patient to take first shot in the office today.    Contraception/ Family  Planning: An IUD is likely a good option for pt as it is not associated with weight gain and is more effective as contraception than norethindrone pill. Pt will not have to consistently worry about accidental pregnancy with an IUD. Refer to Elizabeth Durant to discuss options.     Anxiety/depression:  Stable      PLAN:                          1. Your blood pressure was 129/ 74 today - congrats! This is under your goal of less than 140/ 90, ideally less than 130/80. Decrease the propranolol dose from 160 mg to 80 mg daily. Let us know if you experience changes in your blood pressure.     2. Your A1C today is 5.9%. Start the Ozempic for help with blood sugar and weight loss. Start at 0.25 mg weekly for 4 weeks, 0.5 mg weekly for 4 weeks, then we can go to 1 mg weekly if needed. The most common side effects is nausea or diarrhea, let us know if these are bothersome.    3. Your weight today was 376.4 lbs.     4. Katy Durant (provider here) can help with IUD options and can insert the IUD    Next MTM visit: Monday July 16, 2018 at 3:30 pm    I spent 30 minutes with this patient today. All changes were made via collaborative practice agreement with Joe Ramirez. A copy of the visit note was provided to the patient's primary care provider.    The patient was given a summary of these recommendations as an after visit summary.     Kirby Peralta [facility].  Medication Therapy Management Provider  [phone]  Zeynep Cardenas, Pharm-D4

## 2018-06-18 NOTE — MR AVS SNAPSHOT
After Visit Summary   6/18/2018    Zenobia Cantu    MRN: [id number]           Patient Information     Date Of Birth          1990        Visit Information        Provider Department      6/18/2018 3:30 PM Kirby Peralta RPH [facility]        Today's Diagnoses     Essential hypertension    -  1    Prediabetes          Care Instructions    Recommendations from today's MTM visit:                                                      1. Your blood pressure was 129/ 74 today - congrats! This is under your goal of less than 140/ 90, ideally less than 130/80. Decrease the propranolol dose from 160 mg to 80 mg daily. Let us know if you experience changes in your blood pressure.     2. Your A1C today is 5.9%. Start the Ozempic for help with blood sugar and weight loss. Start at 0.25 mg weekly for 4 weeks, 0.5 mg weekly for 4 weeks, then we can go to 1 mg weekly if needed. The most common side effects is nausea or diarrhea, let us know if these are bothersome.    3. Your weight today was 376 pounds.     4. Katy Durant (provider here) can help with IUD options and can insert the IUD    Next MTM visit: Monday July 16, 2018 at 3:30 pm    To schedule another MTM appointment, please call the clinic directly or you may call the MTM scheduling line at [phone] or toll-free at [phone].     My Clinical Pharmacist's contact information:                                                      It was a pleasure seeing you today!  Please feel free to contact me with any questions or concerns you have.      Kirby Peralta [facility].  Medication Therapy Management Provider  [phone]  Zeynep Cardenas, Pharm-D4      You may receive a survey about the MTM services you received.  I would appreciate your feedback to help me serve you better in the future. Please fill it out and return it when you can. Your comments will be anonymous.                  Follow-ups after your visit        Your next 10  appointments already scheduled     Jul 16, 2018  3:30 PM CDT   SHORT with Kirby Peralta RPH   [facility] [place] ([facility])    [address]  [facility] [address] [phone]              Who to contact     If you have questions or need follow up information about today's clinic visit or your schedule please contact [facility] directly at [phone].  Normal or non-critical lab and imaging results will be communicated to you by [employer], letter or phone within 4 business days after the clinic has received the results. If you do not hear from us within 7 days, please contact the clinic through [employer] or phone. If you have a critical or abnormal lab result, we will notify you by phone as soon as possible.  Submit refill requests through [employer] or call your pharmacy and they will forward the refill request to us. Please allow 3 business days for your refill to be completed.          Additional Information About Your Visit        [employer] Information     [employer] gives you secure access to your electronic health record. If you see a primary care provider, you can also send messages to your care team and make appointments. If you have questions, please call your primary care clinic.  If you do not have a primary care provider, please call [phone] and they will assist you.        Care EveryWhere ID     This is your Care EveryWhere ID. This could be used by other organizations to access your [place] medical records  [id number]        Your Vitals Were     Pulse Pulse Oximetry BMI (Body Mass Index)             76 98% 49.66 kg/m2          Blood Pressure from Last 3 Encounters:   06/18/18 128/72   05/21/18 142/78   04/26/18 148/83    Weight from Last 3 Encounters:   06/18/18 (!) 376 lb 6.4 oz (170.7 kg)   05/21/18 (!) 382 lb (173.3 kg)   04/26/18 (!) 381 lb 9.6 oz (173.1 kg)                 Today's Medication Changes          These changes are  accurate as of 6/18/18  4:56 PM.  If you have any questions, ask your nurse or doctor.               Start taking these medicines.        Dose/Directions    Semaglutide 0.25 or 0.5 MG/DOSE Sopn   Commonly known as:  OZEMPIC   Used for:  Prediabetes   Started by:  Kirby Peralta RPH        Dose:  0.25 mg   Inject 0.25 mg Subcutaneous once a week   Quantity:  1.5 mL   Refills:  3         These medicines have changed or have updated prescriptions.        Dose/Directions    metFORMIN 500 MG 24 hr tablet   Commonly known as:  GLUCOPHAGE-XR   This may have changed:    - how much to take  - how to take this  - when to take this  - additional instructions   Used for:  IFG (impaired fasting glucose)        TAKE 2 TABLET(500 MG) BY MOUTH TWICE DAILY WITH MEALS   Quantity:  360 tablet   Refills:  3            Where to get your medicines      These medications were sent to [facility] [address]  [address] [facility] [address]     Phone:  [phone]     Semaglutide 0.25 or 0.5 MG/DOSE Sopn                Primary Care Provider Office Phone # Fax #    Joe Ramirez PA-C [phone]  [address] [facility] [address]        Equal Access to Services     DAVY RAMIREZ AH: Hadii valerio ku hadasho Soomaali, waaxda luqadaha, qaybta kaalmada adeegyada, waxay lilibeth hayazalea lemos. So [facility] [phone].    ATENCIÓN: Si habla español, tiene a prescott disposición servicios gratuitos de asistencia lingüística. Llame al [phone].    We comply with applicable federal civil rights laws and [place] laws. We do not discriminate on the basis of race, color, national origin, age, disability, sex, sexual orientation, or gender identity.            Thank you!     Thank you for choosing [facility]  for your care. Our goal is always to provide you with excellent care. Hearing back from our patients is one way we can continue to  improve our services. Please take a few minutes to complete the written survey that you may receive in the mail after your visit with us. Thank you!             Your Updated Medication List - Protect others around you: Learn how to safely use, store and throw away your medicines at www.disposemymeds.org.          This list is accurate as of 6/18/18  4:56 PM.  Always use your most recent med list.                   Brand Name Dispense Instructions for use Diagnosis    amLODIPine 10 MG tablet    NORVASC    90 tablet    Take 1 tablet (10 mg) by mouth daily    Essential hypertension       FLUoxetine 40 MG capsule    PROzac     TK 1 C PO D        hydrochlorothiazide 25 MG tablet    HYDRODIURIL    30 tablet    Take 1 tablet (25 mg) by mouth daily    Essential hypertension       metFORMIN 500 MG 24 hr tablet    GLUCOPHAGE-XR    360 tablet    TAKE 2 TABLET(500 MG) BY MOUTH TWICE DAILY WITH MEALS    IFG (impaired fasting glucose)       norethindrone 0.35 MG per tablet    MICRONOR    84 tablet    Take 1 tablet (0.35 mg) by mouth daily    Encounter for surveillance of contraceptive pills       olmesartan 20 MG tablet    BENICAR    180 tablet    Take 1 tablet (20 mg) by mouth 2 times daily    Essential hypertension       propranolol 160 MG 24 hr capsule    INDERAL LA    90 capsule    TAKE 1 CAPSULE(160 MG) BY MOUTH DAILY        Semaglutide 0.25 or 0.5 MG/DOSE Sopn    OZEMPIC    1.5 mL    Inject 0.25 mg Subcutaneous once a week    Prediabetes       spironolactone 25 MG tablet    ALDACTONE    30 tablet    Take 1 tablet (25 mg) by mouth daily    Essential hypertension       vitamin D 2000 units Caps      Take 2,000 Units by mouth daily

## 2018-06-18 NOTE — PATIENT INSTRUCTIONS
Recommendations from today's MTM visit:                                                      1. Your blood pressure was 129/ 74 today - congrats! This is under your goal of less than 140/ 90, ideally less than 130/80. Decrease the propranolol dose from 160 mg to 80 mg daily. Let us know if you experience changes in your blood pressure.     2. Your A1C today is 5.9%. Start the Ozempic for help with blood sugar and weight loss. Start at 0.25 mg weekly for 4 weeks, 0.5 mg weekly for 4 weeks, then we can go to 1 mg weekly if needed. The most common side effects is nausea or diarrhea, let us know if these are bothersome.    3. Your weight today was 376 pounds.     4. Katy Durant (provider here) can help with IUD options and can insert the IUD    Next MTM visit: Monday July 16, 2018 at 3:30 pm    To schedule another MTM appointment, please call the clinic directly or you may call the MTM scheduling line at [phone] or toll-free at [phone].     My Clinical Pharmacist's contact information:                                                      It was a pleasure seeing you today!  Please feel free to contact me with any questions or concerns you have.      Kirby Peralta [facility].  Medication Therapy Management Provider  [phone]  Zeynep Cardenas, Pharm-D4      You may receive a survey about the MTM services you received.  I would appreciate your feedback to help me serve you better in the future. Please fill it out and return it when you can. Your comments will be anonymous.

## 2018-06-18 NOTE — Clinical Note
Joe--jeremy--started Zenobia on Ozempic for blood sugar and weight control, spironolactone working well for blood pressure now so we will attempt to wean off propranolol, have referred patient to Dr. Gil here for potential IUD placement.  Thank you, Ariela

## 2018-06-19 LAB
ANION GAP SERPL CALCULATED.3IONS-SCNC: 8 MMOL/L (ref 3–14)
BUN SERPL-MCNC: 17 MG/DL (ref 7–30)
CALCIUM SERPL-MCNC: 8.9 MG/DL (ref 8.5–10.1)
CHLORIDE SERPL-SCNC: 104 MMOL/L (ref 94–109)
CO2 SERPL-SCNC: 25 MMOL/L (ref 20–32)
CREAT SERPL-MCNC: 0.87 MG/DL (ref 0.52–1.04)
GFR SERPL CREATININE-BSD FRML MDRD: 78 ML/MIN/1.7M2
GLUCOSE SERPL-MCNC: 99 MG/DL (ref 70–99)
POTASSIUM SERPL-SCNC: 5 MMOL/L (ref 3.4–5.3)
SODIUM SERPL-SCNC: 137 MMOL/L (ref 133–144)

## 2018-07-06 ENCOUNTER — MYC MEDICAL ADVICE (OUTPATIENT)
Dept: FAMILY MEDICINE | Facility: CLINIC | Age: 28
End: 2018-07-06

## 2018-07-09 NOTE — TELEPHONE ENCOUNTER
7-9-18    [facility] reports home bps' good --will just recheck next week in clinic at [facility] appt.      Kirby Peralta Rph.  Medication Therapy Management Provider  [phone]

## 2018-07-16 ENCOUNTER — OFFICE VISIT (OUTPATIENT)
Dept: PHARMACY | Facility: CLINIC | Age: 28
End: 2018-07-16
Payer: COMMERCIAL

## 2018-07-16 VITALS
DIASTOLIC BLOOD PRESSURE: 70 MMHG | HEART RATE: 69 BPM | SYSTOLIC BLOOD PRESSURE: 128 MMHG | OXYGEN SATURATION: 97 % | BODY MASS INDEX: 49.61 KG/M2 | WEIGHT: 293 LBS

## 2018-07-16 DIAGNOSIS — I10 ESSENTIAL HYPERTENSION: ICD-10-CM

## 2018-07-16 DIAGNOSIS — E66.01 MORBID OBESITY (H): Primary | ICD-10-CM

## 2018-07-16 DIAGNOSIS — Z30.9 ENCOUNTER FOR CONTRACEPTIVE MANAGEMENT, UNSPECIFIED TYPE: ICD-10-CM

## 2018-07-16 DIAGNOSIS — R73.03 PREDIABETES: ICD-10-CM

## 2018-07-16 PROCEDURE — 99207 ZZC NO CHARGE LOS: CPT | Performed by: PHARMACIST

## 2018-07-16 NOTE — PATIENT INSTRUCTIONS
Recommendations from today's MTM visit:                                                        1. Let's discontinue the propranolol. Your blood pressure was 128/70 today. Let us know if you see an increase in your blood pressures.     2. Let's continue the Ozempic 0.5 mg once weekly.     3. We put in a referral for the dietitian. Her name is Sandi Wright. She is a great resource for diet recommendations.     4. Discuss various birth control options with Katy next week. The IUD is likely a good option due to less potential for weight gain.     Next MTM visit: Monday August 13, 2018 at 3:30 pm     To schedule another MTM appointment, please call the clinic directly or you may call the MTM scheduling line at [phone] or toll-free at [phone].     My Clinical Pharmacist's contact information:                                                      It was a pleasure seeing you today!  Please feel free to contact me with any questions or concerns you have.      Kirby Peralta [facility].  Medication Therapy Management Provider  [phone]  Zeynep Cardenas, Pharm-D4

## 2018-07-16 NOTE — MR AVS SNAPSHOT
After Visit Summary   7/16/2018    Zenobia Cantu    MRN: [id number]           Patient Information     Date Of Birth          1990        Visit Information        Provider Department      7/16/2018 3:30 PM Kirby Peralta RPH [facility]        Today's Diagnoses     Morbid obesity (H)    -  1      Care Instructions    Recommendations from today's MT visit:                                                        1. Let's discontinue the propranolol. Your blood pressure was 128/70 today. Let us know if you see an increase in your blood pressures.     2. Let's continue the Ozempic 0.5 mg once weekly.     3. We put in a referral for the dietitian. Her name is Sandi Wright. She is a great resource for diet recommendations.     4. Discuss various birth control options with Katy next week. The IUD is likely a good option due to less potential for weight gain.     Next MTM visit: Monday August 13, 2018 at 3:30 pm     To schedule another [facility] appointment, please call the clinic directly or you may call the [facility] scheduling line at [phone] or toll-free at [phone].     My Clinical Pharmacist's contact information:                                                      It was a pleasure seeing you today!  Please feel free to contact me with any questions or concerns you have.      Kirby Peralta Rph.  Medication Therapy Management Provider  [phone]  Zeynep Cardenas, Pharm-D4                Follow-ups after your visit        Additional Services     NUTRITION REFERRAL       Your provider has referred you to: FM[facility]: [facility], [phone]  [email or web address]     Please be aware that coverage of these services is subject to the terms and limitations of your health insurance plan.  Call member services at your health plan with any benefit or coverage questions.      Please bring the following with you to your appointment:    (1)  This referral request  (2) Any documents given to you regarding this referral  (3) Any specific questions you have about diet and/or food choices                  Your next 10 appointments already scheduled     Jul 23, 2018  3:30 PM CDT   MyChart Long with Katy Durant MD   [facility]    [address] [place] [address] [phone]            Aug 13, 2018  3:30 PM CDT   SHORT with Kirby Peralta RPH   [facility] ([facility])    [address] [place] [address] [phone]              Who to contact     If you have questions or need follow up information about today's clinic visit or your schedule please contact [facility] directly at [phone].  Normal or non-critical lab and imaging results will be communicated to you by MyChart, letter or phone within 4 business days after the clinic has received the results. If you do not hear from us within 7 days, please contact the clinic through MyChart or phone. If you have a critical or abnormal lab result, we will notify you by phone as soon as possible.  Submit refill requests through [employer] or call your pharmacy and they will forward the refill request to us. Please allow 3 business days for your refill to be completed.          Additional Information About Your Visit        MyChart Information     [employer] gives you secure access to your electronic health record. If you see a primary care provider, you can also send messages to your care team and make appointments. If you have questions, please call your primary care clinic.  If you do not have a primary care provider, please call [phone] and they will assist you.        Care EveryWhere ID     This is your Care EveryWhere ID. This could be used by other organizations to access your [place] medical records  [id number]        Your Vitals Were     Pulse Pulse  Oximetry BMI (Body Mass Index)             69 97% 49.61 kg/m2          Blood Pressure from Last 3 Encounters:   07/16/18 128/70   06/18/18 128/72   05/21/18 142/78    Weight from Last 3 Encounters:   07/16/18 (!) 376 lb (170.6 kg)   06/18/18 (!) 376 lb 6.4 oz (170.7 kg)   05/21/18 (!) 382 lb (173.3 kg)              We Performed the Following     NUTRITION REFERRAL          Today's Medication Changes          These changes are accurate as of 7/16/18  4:02 PM.  If you have any questions, ask your nurse or doctor.               These medicines have changed or have updated prescriptions.        Dose/Directions    metFORMIN 500 MG 24 hr tablet   Commonly known as:  GLUCOPHAGE-XR   This may have changed:    - how much to take  - how to take this  - when to take this  - additional instructions   Used for:  IFG (impaired fasting glucose)        TAKE 2 TABLET(500 MG) BY MOUTH TWICE DAILY WITH MEALS   Quantity:  360 tablet   Refills:  3       propranolol 80 MG 24 hr capsule   Commonly known as:  INDERAL LA   This may have changed:  Another medication with the same name was removed. Continue taking this medication, and follow the directions you see here.   Used for:  Essential hypertension        Dose:  80 mg   Take 1 capsule (80 mg) by mouth daily   Quantity:  30 capsule   Refills:  1                Primary Care Provider Office Phone # Fax #    Joe Ramirez PA-C [phone]       [address] [facility] [address]        Equal Access to Services     JOHN RAMIREZ AH: Hadii valerio churchillo Somiguelito, waaxda luqadaha, qaybta kaalmada adeegyada, ronel lemos. So [facility] [phone].    ATENCIÓN: Si habla español, tiene a prescott disposición servicios gratuitos de asistencia lingüística. Llame al [phone].    We comply with applicable federal civil rights laws and [place] laws. We do not discriminate on the basis of race, color, national origin, age, disability, sex, sexual  orientation, or gender identity.            Thank you!     Thank you for choosing [facility]  for your care. Our goal is always to provide you with excellent care. Hearing back from our patients is one way we can continue to improve our services. Please take a few minutes to complete the written survey that you may receive in the mail after your visit with us. Thank you!             Your Updated Medication List - Protect others around you: Learn how to safely use, store and throw away your medicines at www.disposemymeds.org.          This list is accurate as of 7/16/18  4:02 PM.  Always use your most recent med list.                   Brand Name Dispense Instructions for use Diagnosis    amLODIPine 10 MG tablet    NORVASC    90 tablet    Take 1 tablet (10 mg) by mouth daily    Essential hypertension       FLUoxetine 40 MG capsule    PROzac     TK 1 C PO D        hydrochlorothiazide 25 MG tablet    HYDRODIURIL    30 tablet    Take 1 tablet (25 mg) by mouth daily    Essential hypertension       metFORMIN 500 MG 24 hr tablet    GLUCOPHAGE-XR    360 tablet    TAKE 2 TABLET(500 MG) BY MOUTH TWICE DAILY WITH MEALS    IFG (impaired fasting glucose)       norethindrone 0.35 MG per tablet    MICRONOR    84 tablet    Take 1 tablet (0.35 mg) by mouth daily    Encounter for surveillance of contraceptive pills       olmesartan 20 MG tablet    BENICAR    180 tablet    Take 1 tablet (20 mg) by mouth 2 times daily    Essential hypertension       propranolol 80 MG 24 hr capsule    INDERAL LA    30 capsule    Take 1 capsule (80 mg) by mouth daily    Essential hypertension       Semaglutide 0.25 or 0.5 MG/DOSE Sopn    OZEMPIC    1.5 mL    Inject 0.25 mg Subcutaneous once a week    Prediabetes       spironolactone 25 MG tablet    ALDACTONE    30 tablet    Take 1 tablet (25 mg) by mouth daily    Essential hypertension       vitamin D 2000 units Caps      Take 2,000 Units by mouth daily

## 2018-07-16 NOTE — Clinical Note
Ladies --jeremy-- miky BP wnl , im weaning her off beta blocker today , increasing her ozempic dose --she requested to see a dietician --so I placed referral for her to see nena --hope you can help  Her with food choices etc. Thanks nena.  Kirby Peralta, [facility]. Medication Therapy Management Provider [phone] Zeynep Cardenas, Pharm-D4

## 2018-07-16 NOTE — PROGRESS NOTES
"SUBJECTIVE/OBJECTIVE:                           Zenobia Cantu is a 28 year old female coming in for an f/up (6-18-18) visit for Medication Therapy Management.  She was referred to me from Joe Ramirez/BP gap program at [facility] .     Chief Complaint:  Here for BP recheck after decreasing propranolol dose and follow up after initiation of Ozempic.  .  Personal Healthcare Goals:  Fix BP, \"get rid of all medications\"    Allergies/ADRs: Reviewed in Epic  Tobacco: No tobacco use  Alcohol: not currently using  Caffeine: now drinks about a couple of sodas each week (previously 20ounce sodas/day)  Activity:  30 minutes /day on treadmill /elliptical 5 days /week -gym hydri  PMH: Reviewed in Epic; family hx --father has hi BP.      Medication Adherence/Access:  no issues reported    Hypertension: Current medications include: 12.5mg spironolactone every morning,  amlodipine 10mg. Hs., hctz 25mg. Qam, , propranolol la 80 mg . 5-6 pm , olmesartan 20mg bid .  Patient does self-monitor BP. Pt. Self reports home BP's: 120/73 - 128/82 P = 70-80s    Weight loss/ prediabetes: Current therapy is metformin 500 mg BID and Ozempic 0.5 mg once weekly. Today is her first dose of 0.5 mg as she has been doing a month of the 0.25 mg dose. Her last A1C was 5.9% (6/18/18). She has not had any weight loss yet, but she is feeling more full. She states that she is starting to re-teach herself that she should stop eating when she feels full. She is interested in meeting with a dietitian for help with dietary recommendations. She denies side effects but notes that she had some nausea a week ago; however, she feels it was related to her menstrual cycle.     Contraception/ Family Planning: Current therapy is norethindrone 0.35 mg daily. Pt is now highly considering an IUD and has an appointment next week to discuss options. She is hopeful the IUD will work out as she is frustrated with inconsistent periods and feels that she cannot trust her " current birth control pill, which is also frustrating to her .     Current labs include:  Today's Vitals: /70  Pulse 69  Wt (!) 376 lb (170.6 kg)  SpO2 97%  BMI 49.61 kg/m2  BP Readings from Last 3 Encounters:   07/16/18 128/70   06/18/18 128/72   05/21/18 142/78     Lab Results   Component Value Date    A1C 5.9 06/18/2018   .  Lab Results   Component Value Date    CHOL 128 08/12/2017     Lab Results   Component Value Date    TRIG 105 08/12/2017     Lab Results   Component Value Date    HDL 42 08/12/2017     Lab Results   Component Value Date    LDL 65 08/12/2017       Liver Function Studies - No results for input(s): PROTTOTAL, ALBUMIN, BILITOTAL, ALKPHOS, AST, ALT, BILIDIRECT in the last 21868 hours.    No results found for: UCRR, MICROL, UMALCR    Last Basic Metabolic Panel:  Lab Results   Component Value Date     06/18/2018      Lab Results   Component Value Date    POTASSIUM 5.0 06/18/2018     Lab Results   Component Value Date    CHLORIDE 104 06/18/2018     Lab Results   Component Value Date    BUN 17 06/18/2018     Lab Results   Component Value Date    CR 0.87 06/18/2018     GFR Estimate   Date Value Ref Range Status   06/18/2018 78 >60 mL/min/1.7m2 Final     Comment:     Non  GFR Calc   05/21/2018 >90 >60 mL/min/1.7m2 Final     Comment:     Non  GFR Calc   12/04/2017 >90 >60 mL/min/1.7m2 Final     Comment:     Non  GFR Calc     GFR Estimate If Black   Date Value Ref Range Status   06/18/2018 >90 >60 mL/min/1.7m2 Final     Comment:      GFR Calc   05/21/2018 >90 >60 mL/min/1.7m2 Final     Comment:      GFR Calc   12/04/2017 >90 >60 mL/min/1.7m2 Final     Comment:      GFR Calc       Most Recent Immunizations   Administered Date(s) Administered     DTAP (<7y) 02/26/1996     Hep B, Peds or Adolescent 05/19/1999     HepA-Adult 08/09/2010     Hpv, Unspecified  03/19/2008     MMR 08/11/2003      Meningococcal (Menactra ) 07/30/2009     Polio, Unspecified  02/26/1996     TDAP Vaccine (Boostrix) 06/25/2012     Varicella 07/16/2007         ASSESSMENT:                             Current medications were reviewed today.     Medication Adherence: excellent, no issues identified    Hypertension: Stable. Patient is meeting BP goal of < 140/90mmHg, ideally < 130/80. Pt's blood pressure has remained stable since decreasing propranolol from 160 mg to 80 mg, thus it is reasonable to try discontinuing this medication. Pt will continue to monitor blood pressure at home and will contact us with any increases in blood pressure. If needed, spironolactone could be increased in the future.       Weight loss/ prediabetes: Needs improvement - Pt will continue the 0.5 mg Ozempic dose. A referral was sent for pt to meet with the clinic dietitian Sandi Wright. Pt would benefit from diet recommendations and continuation of Ozempic. If no weight loss at next appointment, may need to consider alternative drug therapy.       Contraception/ Family Planning: An IUD is likely a good option for pt as it is not associated with weight gain and is more effective as contraception than norethindrone pill. Pt will not have to consistently worry about accidental pregnancy with an IUD. Pt has an appointment next week to discuss options.       PLAN:                            1. Let's discontinue the propranolol. Your blood pressure was 128/70 today. Let us know if you see an increase in your blood pressures.     2. Let's continue the Ozempic 0.5 mg once weekly.     3. We put in a referral for the dietitian. Her name is Sandi Wright. She is a great resource for diet recommendations.     4. Discuss various birth control options with Katy next week. The IUD is likely a good option due to less potential for weight gain.     Next MT visit: Monday August 13, 2018 at 3:30 pm       I spent 30 minutes with this patient today. All changes were made via  collaborative practice agreement with Joe Ramirez. A copy of the visit note was provided to the patient's primary care provider.    The patient was given a summary of these recommendations as an after visit summary.     Kirby Peralta [facility].  Medication Therapy Management Provider  [phone]  Zeynep Cardenas, Pharm-D4

## 2018-07-17 ENCOUNTER — MYC MEDICAL ADVICE (OUTPATIENT)
Dept: PHARMACY | Facility: CLINIC | Age: 28
End: 2018-07-17

## 2018-07-17 NOTE — TELEPHONE ENCOUNTER
7-17-18      Pt. Worried her hctz med was fda recalled --it was not --only those with valsartan-hct --she has hctz and olmesartan separately .    Continue her bp meds as is .    Kirby Peralta [facility].  Medication Therapy Management Provider  [phone]

## 2018-07-23 ENCOUNTER — MYC MEDICAL ADVICE (OUTPATIENT)
Dept: PHARMACY | Facility: CLINIC | Age: 28
End: 2018-07-23

## 2018-07-23 ENCOUNTER — OFFICE VISIT (OUTPATIENT)
Dept: FAMILY MEDICINE | Facility: CLINIC | Age: 28
End: 2018-07-23
Payer: COMMERCIAL

## 2018-07-23 VITALS
HEART RATE: 84 BPM | BODY MASS INDEX: 48.68 KG/M2 | DIASTOLIC BLOOD PRESSURE: 76 MMHG | RESPIRATION RATE: 20 BRPM | TEMPERATURE: 98.3 F | SYSTOLIC BLOOD PRESSURE: 136 MMHG | WEIGHT: 293 LBS

## 2018-07-23 DIAGNOSIS — Z30.09 COUNSELING FOR BIRTH CONTROL REGARDING INTRAUTERINE DEVICE (IUD): Primary | ICD-10-CM

## 2018-07-23 DIAGNOSIS — E66.01 MORBID OBESITY (H): ICD-10-CM

## 2018-07-23 DIAGNOSIS — I10 ESSENTIAL HYPERTENSION: ICD-10-CM

## 2018-07-23 PROCEDURE — 99213 OFFICE O/P EST LOW 20 MIN: CPT | Performed by: FAMILY MEDICINE

## 2018-07-23 RX ORDER — MISOPROSTOL 200 UG/1
TABLET ORAL
Qty: 1 TABLET | Refills: 0 | Status: SHIPPED | OUTPATIENT
Start: 2018-07-23 | End: 2018-09-17

## 2018-07-23 NOTE — TELEPHONE ENCOUNTER
"7/23/2018    Each Ozempic pen contains 2 mg of drug. The initial pen will last 6 weeks due to the titration schedule (0.25 + 0.25 + 0.25 + 0.25 + 0.5 + 0.5), then remaining pens will last 4 weeks (0.5 + 0.5 + 0.5 + 0.5). Will notify pt to begin new pen this week as it is likely out of medication.     Zeynep Cardenas, Pharm-D4    Pt states she has not been changing the needle. \"I had not been as it still had liquid in it. My bad/misunderstanding. I will do this going forward and order a new pen.\"    "

## 2018-07-23 NOTE — PATIENT INSTRUCTIONS
Birth Control: IUD (Intrauterine Device)    The IUD (intrauterine device) is small, flexible, and T-shaped. A trained healthcare provider places it in the uterus. The IUD is one of the most effective birth control methods. It is also reversible. This means it can be removed at any time by a trained healthcare provider. New IUDs are safe and do not have the risks of older types of IUDs.  Pregnancy rates  Talk to your healthcare provider about the effectiveness of this birth control method.  Types of IUDs  IUD insertion is done in the healthcare provider s office. Two types of IUDs are available:    The copper IUD releases a small amount of copper into the uterus. The copper makes it harder for sperm to reach the egg. The device works for at least 10 years.    The progestin IUD releases a hormone called progestin. It causes changes in the uterus to help prevent pregnancy. The device works for 3 to 5 years, depending on which device is chosen. It may be recommended for women who have anemia or heavy and painful periods.  IUDs have thin strings that hang from the opening of the uterus into the vagina. This lets you check that the IUD stays in place.  Things to know about IUDs    IUDs can be used by women who have never been pregnant or by women with a history of sexually transmitted infections (STIs) or tubal pregnancy.    It won't move from the uterus to any other part of the body.    There is a slight risk of the device coming out of the vagina (expulsion).    It may not work in women who have an abnormally shaped uterus.    A copper IUD may cause heavier periods and cramping.    Progestin IUD may cause light periods or no periods at all (irregular bleeding or spotting is possible and normal during first 3 to 6 months).    If you get a sexually transmitted infection with an IUD in place, symptoms may be more severe.  What to report to your healthcare provider  Be sure your healthcare provider knows if you  have:    A sexually transmitted infection (STI) or possible STI    Liver problems    Blood clots (for progestin IUD only)    Breast cancer or a history of breast cancer (progestin IUD only)   Date Last Reviewed: 3/1/2017    0641-9728 [employer]. [address] [place] [address]. All rights reserved. This information is not intended as a substitute for professional medical care. Always follow your healthcare professional's instructions.        Levonorgestrel intrauterine device (IUD)  Brand Names: Kyleena, LILETTA, Mirena, Faith  What is this medicine?  LEVONORGESTREL IUD (RAMON voe nor alejandra trel) is a contraceptive (birth control) device. The device is placed inside the uterus by a healthcare professional. It is used to prevent pregnancy. This device can also be used to treat heavy bleeding that occurs during your period.  How should I use this medicine?  This device is placed inside the uterus by a health care professional.  Talk to your pediatrician regarding the use of this medicine in children. Special care may be needed.  What side effects may I notice from receiving this medicine?  Side effects that you should report to your doctor or health care professional as soon as possible:    allergic reactions like skin rash, itching or hives, swelling of the face, lips, or tongue    fever, flu-like symptoms    genital sores    high blood pressure    no menstrual period for 6 weeks during use    pain, swelling, warmth in the leg    pelvic pain or tenderness    severe or sudden headache    signs of pregnancy    stomach cramping    sudden shortness of breath    trouble with balance, talking, or walking    unusual vaginal bleeding, discharge    yellowing of the eyes or skin  Side effects that usually do not require medical attention (report to your doctor or health care professional if they continue or are bothersome):    acne    breast pain    change in sex drive or performance    changes in  weight    cramping, dizziness, or faintness while the device is being inserted    headache    irregular menstrual bleeding within first 3 to 6 months of use    nausea  What may interact with this medicine?  Do not take this medicine with any of the following medications:    amprenavir    bosentan    fosamprenavir  This medicine may also interact with the following medications:    aprepitant    armodafinil    barbiturate medicines for inducing sleep or treating seizures    bexarotene    boceprevir    griseofulvin    medicines to treat seizures like carbamazepine, ethotoin, felbamate, oxcarbazepine, phenytoin, topiramate    modafinil    pioglitazone    rifabutin    rifampin    rifapentine    some medicines to treat HIV infection like atazanavir, efavirenz, indinavir, lopinavir, nelfinavir, tipranavir, ritonavir    Neyda's wort    warfarin  What if I miss a dose?  This does not apply. Depending on the brand of device you have inserted, the device will need to be replaced every 3 to 5 years if you wish to continue using this type of birth control.  Where should I keep my medicine?  This does not apply.  What should I tell my health care provider before I take this medicine?  They need to know if you have any of these conditions:    abnormal Pap smear    cancer of the breast, uterus, or cervix    diabetes    endometritis    genital or pelvic infection now or in the past    have more than one sexual partner or your partner has more than one partner    heart disease    history of an ectopic or tubal pregnancy    immune system problems    IUD in place    liver disease or tumor    problems with blood clots or take blood-thinners    seizures    use intravenous drugs    uterus of unusual shape    vaginal bleeding that has not been explained    an unusual or allergic reaction to levonorgestrel, other hormones, silicone, or polyethylene, medicines, foods, dyes, or preservatives    pregnant or trying to get  pregnant    breast-feeding  What should I watch for while using this medicine?  Visit your doctor or health care professional for regular check ups. See your doctor if you or your partner has sexual contact with others, becomes HIV positive, or gets a sexual transmitted disease.  This product does not protect you against HIV infection (AIDS) or other sexually transmitted diseases.  You can check the placement of the IUD yourself by reaching up to the top of your vagina with clean fingers to feel the threads. Do not pull on the threads. It is a good habit to check placement after each menstrual period. Call your doctor right away if you feel more of the IUD than just the threads or if you cannot feel the threads at all.  The IUD may come out by itself. You may become pregnant if the device comes out. If you notice that the IUD has come out use a backup birth control method like condoms and call your health care provider.  Using tampons will not change the position of the IUD and are okay to use during your period.  This IUD can be safely scanned with magnetic resonance imaging (MRI) only under specific conditions. Before you have an MRI, tell your healthcare provider that you have an IUD in place, and which type of IUD you have in place.  NOTE:This sheet is a summary. It may not cover all possible information. If you have questions about this medicine, talk to your doctor, pharmacist, or health care provider. Copyright  2018 [employer]

## 2018-07-23 NOTE — PROGRESS NOTES
SUBJECTIVE:   Zenobia Cantu is a 28 year old female who presents to clinic today for the following health issues:      -IUD consult; currently on pills-recently changed brands due to high BP patient has been having irregular periods since about every 3 weeks.   She is on 4 different meds for hypertension and her primary thinks it might be a good idea to come off hormonal birth control.   She is losing weight currently on semaglutide which she started last month.   She would like to have kids in the next 5 years but not now.       Obstetric History       T0      L0     SAB0   TAB0   Ectopic0   Multiple0   Live Births0             Past Medical History:   Diagnosis Date     Depressive disorder      Essential hypertension 2017       History reviewed. No pertinent surgical history.    MEDICATIONS:  Current Outpatient Prescriptions   Medication     amLODIPine (NORVASC) 10 MG tablet     Cholecalciferol (VITAMIN D) 2000 units CAPS     FLUoxetine (PROZAC) 40 MG capsule     hydrochlorothiazide (HYDRODIURIL) 25 MG tablet     metFORMIN (GLUCOPHAGE-XR) 500 MG 24 hr tablet     norethindrone (MICRONOR) 0.35 MG per tablet     olmesartan (BENICAR) 20 MG tablet     Semaglutide (OZEMPIC) 0.25 or 0.5 MG/DOSE SOPN     spironolactone (ALDACTONE) 25 MG tablet     No current facility-administered medications for this visit.        SOCIAL HISTORY:  Social History   Substance Use Topics     Smoking status: Never Smoker     Smokeless tobacco: Never Used     Alcohol use Yes      Comment: rarely       Family History   Problem Relation Age of Onset     Hypertension Father      He lives a healthy lifestyle     Depression Father      Hypertension Paternal Grandmother      Hypertension Other      Believe my grandmother's mom had problems too       Objective:  Blood pressure 136/76, pulse 84, temperature 98.3  F (36.8  C), temperature source Oral, resp. rate 20, weight (!) 369 lb (167.4 kg), not currently  breastfeeding.  Neck:  There is no lymphadenopathy or thyroid tenderness or enlargement  Chest: Clear to auscultation bilaterally.  No wheezes, rales or retractions.  CV: Regular rate and rhythm without murmurs, rubs or gallops.    Assessment:  1. Consult regarding IUD  2. hypertension - under fair control      Plan:  1. mirena would be good option  2. Handout on the above mirena and paragard was given to the patient and discussed  3. cytotec 200 mmg 1 hour prior to procedure  4. Come for placement toward end of menses

## 2018-07-28 DIAGNOSIS — Z30.41 ENCOUNTER FOR SURVEILLANCE OF CONTRACEPTIVE PILLS: ICD-10-CM

## 2018-07-30 RX ORDER — NORETHINDRONE
KIT
Qty: 84 TABLET | Refills: 0 | Status: SHIPPED | OUTPATIENT
Start: 2018-07-30 | End: 2018-09-17

## 2018-07-30 NOTE — TELEPHONE ENCOUNTER
Prescription approved per [facility] Refill Protocol.  Bozena Juarez RN, BSN

## 2018-07-30 NOTE — TELEPHONE ENCOUNTER
"Requested Prescriptions   Pending Prescriptions Disp Refills     NORLYDA 0.35 MG per tablet [Pharmacy Med Name: NORLYDA 0.35MG TABLETS 28S] 84 tablet 0    Last Written Prescription Date:  3/19/18  Last Fill Quantity: 84,  # refills: 1   Last Office Visit: 7/23/2018   Future Office Visit:    Next 5 appointments (look out 90 days)     Aug 13, 2018  3:30 PM CDT   SHORT with Kirby Peralta RPH   [facility])    [address] [place] [id number]   [phone]            Aug 20, 2018  2:30 PM CDT   Office Visit with Sandi Wright   [facility] ([address] [id number]   [phone]            Sep 10, 2018  3:30 PM CDT   PHYSICAL with Katy Durant MD   [facility]    [address] [place] [id number]   [phone]                  Sig: TAKE 1 TABLET(0.35 MG) BY MOUTH DAILY    Contraceptives Protocol Passed    7/28/2018  2:34 PM       Passed - Patient is not a current smoker if age is 35 or older       Passed - Recent (12 mo) or future (30 days) visit within the authorizing provider's specialty    Patient had office visit in the last 12 months or has a visit in the next 30 days with authorizing provider or within the authorizing provider's specialty.  See \"Patient Info\" tab in inbasket, or \"Choose Columns\" in Meds & Orders section of the refill encounter.           Passed - No active pregnancy on record       Passed - No positive pregnancy test in past 12 months          "

## 2018-08-06 NOTE — TELEPHONE ENCOUNTER
8-6-18    mtm notes BP great off propranolol.    Kirby Peralta [facility].  Medication Therapy Management Provider  [phone]

## 2018-08-08 ENCOUNTER — MYC MEDICAL ADVICE (OUTPATIENT)
Dept: PHARMACY | Facility: CLINIC | Age: 28
End: 2018-08-08

## 2018-08-09 ENCOUNTER — MYC MEDICAL ADVICE (OUTPATIENT)
Dept: PHARMACY | Facility: CLINIC | Age: 28
End: 2018-08-09

## 2018-08-09 NOTE — TELEPHONE ENCOUNTER
8-8-18    mtm notes pt. Had a low BP of 93/58mmhg --she ate drank somethin g and felt better , then last 2 bp's    =    The last few days the blood pressure has been between 118/75 and 107/81. With pulse 100.      Stay on bp meds as is --message me if 93/58 range again.    Kirby Peralta [facility].  Medication Therapy Management Provider  [phone]

## 2018-08-09 NOTE — TELEPHONE ENCOUNTER
ozepmpic may be causing upset stomach --take tums for now.  Low bp addressed in other mychart message from today .     Kirby Peralta [facility].  Medication Therapy Management Provider  [phone]'

## 2018-08-10 ENCOUNTER — MYC MEDICAL ADVICE (OUTPATIENT)
Dept: PHARMACY | Facility: CLINIC | Age: 28
End: 2018-08-10

## 2018-08-13 ENCOUNTER — OFFICE VISIT (OUTPATIENT)
Dept: PHARMACY | Facility: CLINIC | Age: 28
End: 2018-08-13
Payer: COMMERCIAL

## 2018-08-13 VITALS
OXYGEN SATURATION: 97 % | DIASTOLIC BLOOD PRESSURE: 72 MMHG | WEIGHT: 293 LBS | HEART RATE: 86 BPM | SYSTOLIC BLOOD PRESSURE: 130 MMHG | BODY MASS INDEX: 48.89 KG/M2

## 2018-08-13 DIAGNOSIS — E66.01 MORBID OBESITY (H): ICD-10-CM

## 2018-08-13 DIAGNOSIS — I10 ESSENTIAL HYPERTENSION: ICD-10-CM

## 2018-08-13 DIAGNOSIS — Z30.9 ENCOUNTER FOR CONTRACEPTIVE MANAGEMENT, UNSPECIFIED TYPE: ICD-10-CM

## 2018-08-13 DIAGNOSIS — R73.03 PREDIABETES: Primary | ICD-10-CM

## 2018-08-13 PROCEDURE — 99207 ZZC NO CHARGE LOS: CPT | Performed by: PHARMACIST

## 2018-08-13 NOTE — MR AVS SNAPSHOT
After Visit Summary   8/13/2018    Zenobia Cantu    MRN: [id number]           Patient Information     Date Of Birth          1990        Visit Information        Provider Department      8/13/2018 3:30 PM Kirby Peralta RPH [facility]        Today's Diagnoses     Prediabetes    -  1      Care Instructions    Recommendations from today's MT visit:                                                        1. FYI--congrats your weight today is 370.2lbs --down 12 lbs. from may-21 -2018.  Lets increase your Ozempic dose to 1mg. /week now.   Keep working great diet and exercise plan !           Next MTM visit:  See me in 1 month for f/up-- Monday September 17th at 3;30pm.     To schedule another MTM appointment, please call the clinic directly or you may call the MTM scheduling line at [phone] or toll-free at [phone].     My Clinical Pharmacist's contact information:                                                      It was a pleasure seeing you today!  Please feel free to contact me with any questions or concerns you have.      Kirby Peralta Rph.  Medication Therapy Management Provider  [phone]      You may receive a survey about the MTM services you received.  I would appreciate your feedback to help me serve you better in the future. Please fill it out and return it when you can. Your comments will be anonymous.                      Follow-ups after your visit        Your next 10 appointments already scheduled     Aug 14, 2018  3:40 PM CDT   [facility]    [id number] [place] [address], [address]  [facility] [address] [phone]            Aug 20, 2018  2:30 PM CDT   Office Visit with Sandi Wright   [facility] ([facility]    [address] [facility] [address] [phone]           Bring a current list of  meds and any records pertaining to this visit. For Physicals, please bring immunization records and any forms needing to be filled out. Please arrive 10 minutes early to complete paperwork.            Sep 10, 2018  3:30 PM CDT   [facility]ant [facility]    [address] [place] [address] [phone]            Sep 17, 2018  3:30 PM CDT   SHORT with Kirby Peralta RPH   [facility] ([facility])    [address] [place] [address] [phone]              Who to contact     If you have questions or need follow up information about today's clinic visit or your schedule please contact [facility] directly at [phone].  Normal or non-critical lab and imaging results will be communicated to you by MyChart, letter or phone within 4 business days after the clinic has received the results. If you do not hear from us within 7 days, please contact the clinic through [employer] or phone. If you have a critical or abnormal lab result, we will notify you by phone as soon as possible.  Submit refill requests through [employer] or call your pharmacy and they will forward the refill request to us. Please allow 3 business days for your refill to be completed.          Additional Information About Your Visit        MyChart Information     [employer] gives you secure access to your electronic health record. If you see a primary care provider, you can also send messages to your care team and make appointments. If you have questions, please call your primary care clinic.  If you do not have a primary care provider, please call [phone] and they will assist you.        Care EveryWhere ID     This is your Care EveryWhere ID. This could be used by other organizations to access your [facility] medical records  [id number]        Your Vitals Were     Pulse Pulse Oximetry BMI  (Body Mass Index)             86 97% 48.89 kg/m2          Blood Pressure from Last 3 Encounters:   08/13/18 130/72   07/23/18 136/76   07/16/18 128/70    Weight from Last 3 Encounters:   08/13/18 (!) 370 lb 9.6 oz (168.1 kg)   07/23/18 (!) 369 lb (167.4 kg)   07/16/18 (!) 376 lb (170.6 kg)              Today, you had the following     No orders found for display         Today's Medication Changes          These changes are accurate as of 8/13/18  4:04 PM.  If you have any questions, ask your nurse or doctor.               Start taking these medicines.        Dose/Directions    Semaglutide 1 MG/DOSE Sopn   Commonly known as:  OZEMPIC   Used for:  Prediabetes   Replaces:  Semaglutide 0.25 or 0.5 MG/DOSE Sopn        Dose:  1 mg   Inject 1 mg Subcutaneous once a week   Quantity:  1.5 mL   Refills:  5         These medicines have changed or have updated prescriptions.        Dose/Directions    metFORMIN 500 MG 24 hr tablet   Commonly known as:  GLUCOPHAGE-XR   This may have changed:    - how much to take  - how to take this  - when to take this  - additional instructions   Used for:  IFG (impaired fasting glucose)        TAKE 2 TABLET(500 MG) BY MOUTH TWICE DAILY WITH MEALS   Quantity:  360 tablet   Refills:  3         Stop taking these medicines if you haven't already. Please contact your care team if you have questions.     Semaglutide 0.25 or 0.5 MG/DOSE Sopn   Commonly known as:  OZEMPIC   Replaced by:  Semaglutide 1 MG/DOSE Sopn                Where to get your medicines      These medications were sent to [facility] [address] [place] [address]  [address] [facility] [address]     Phone:  [phone]     Semaglutide 1 MG/DOSE Sopn                Primary Care Provider Office Phone # Fax #    Joe Ramirez PA-C [phone]  [address] [facility] [address]        Equal Access to Services     DAVY RAMIREZ AH: alma Pichardo  kwabena rhett rogerioronel portillo. So [facility] [phone].    ATENCIÓN: Si ruthie le, tiene a prescott disposición servicios gratuitos de asistencia lingüística. Marcio al [phone].    We comply with applicable federal civil rights laws and [place] laws. We do not discriminate on the basis of race, color, national origin, age, disability, sex, sexual orientation, or gender identity.            Thank you!     Thank you for choosing [facility]  for your care. Our goal is always to provide you with excellent care. Hearing back from our patients is one way we can continue to improve our services. Please take a few minutes to complete the written survey that you may receive in the mail after your visit with us. Thank you!             Your Updated Medication List - Protect others around you: Learn how to safely use, store and throw away your medicines at www.disposemymeds.org.          This list is accurate as of 8/13/18  4:04 PM.  Always use your most recent med list.                   Brand Name Dispense Instructions for use Diagnosis    amLODIPine 10 MG tablet    NORVASC    90 tablet    Take 1 tablet (10 mg) by mouth daily    Essential hypertension       FLUoxetine 40 MG capsule    PROzac     TK 1 C PO D        hydrochlorothiazide 25 MG tablet    HYDRODIURIL    30 tablet    Take 1 tablet (25 mg) by mouth daily    Essential hypertension       metFORMIN 500 MG 24 hr tablet    GLUCOPHAGE-XR    360 tablet    TAKE 2 TABLET(500 MG) BY MOUTH TWICE DAILY WITH MEALS    IFG (impaired fasting glucose)       misoprostol 200 MCG tablet    CYTOTEC    1 tablet    Take one tab one hour prior to procedure    Counseling for birth control regarding intrauterine device (IUD)       NORLYDA 0.35 MG per tablet   Generic drug:  norethindrone     84 tablet    TAKE 1 TABLET(0.35 MG) BY MOUTH DAILY    Encounter for surveillance of contraceptive pills       olmesartan 20 MG tablet     BENICAR    180 tablet    Take 1 tablet (20 mg) by mouth 2 times daily    Essential hypertension       Semaglutide 1 MG/DOSE Sopn    OZEMPIC    1.5 mL    Inject 1 mg Subcutaneous once a week    Prediabetes       spironolactone 25 MG tablet    ALDACTONE    30 tablet    Take 1 tablet (25 mg) by mouth daily    Essential hypertension       vitamin D 2000 units Caps      Take 2,000 Units by mouth daily

## 2018-08-13 NOTE — Clinical Note
Joe--jeremy--all going well --BP great off propranolol , losing weight on ozempic --success so far! -Kirby

## 2018-08-13 NOTE — PROGRESS NOTES
"SUBJECTIVE/OBJECTIVE:                           Zenobia Cantu is a 28 year old female coming in for an f/up (7-16-18) visit for Medication Therapy Management.  She was referred to me from Joe Ramirez/BP gap program at [facility] .     Chief Complaint:   Here for f/up ozempic-working well. . Had issue with orange/red urine from azo-dye--explained to pt. --normal se of that med --all resolved now.   She feels great off beta blocker bp med now.       .  Personal Healthcare Goals:  Fix BP, \"get rid of all medications\"    Allergies/ADRs: Reviewed in Epic  Tobacco: No tobacco use  Alcohol: not currently using  Caffeine: now drinks about a couple of sodas each week (previously 20ounce sodas/day)  Activity:  30 minutes /day on treadmill /elliptical 5 days /week -gym .  PMH: Reviewed in Epic; family hx --father has hi BP.      Medication Adherence/Access:  no issues reported    Hypertension: Current medications include: 12.5mg spironolactone every morning,  amlodipine 10mg. Hs., hctz 25mg. Qam,  olmesartan 20mg bid .  Patient does self-monitor BP. Pt. Self reports home BP's: 120/73 - 128/82 P = 70-80s    BP Readings from Last 3 Encounters:   08/13/18 130/72   07/23/18 136/76   07/16/18 128/70         Weight loss/ prediabetes: Current therapy is metformin-2x 500 mg. BID and Ozempic 0.5 mg once weekly(feels fullness-eating way less food now per meal). Feels fine gi wise.    Last a1c 5.9%.    Contraception/ Family Planning: Current therapy is norethindrone 0.35 mg daily. Pt is now highly considering an IUD and has an appointment next week to discuss options-she will,have that in sept-2018.. She is hopeful the IUD will work out as she is frustrated with inconsistent periods and feels that she cannot trust her current birth control pill, which is also frustrating to her .         ASSESSMENT:                             Current medications were reviewed today.     Medication Adherence: excellent, no issues " identified    Hypertension: Stable. Patient is meeting BP goal of < 140/90mmHg.   Pt would benefit from the following changes - continued BP monitoring, watching diet, increasing exercise and weight loss and sodium restriction        Weight loss/ prediabetes: Improving: increase Ozempic today to 1mg./week.     Contraception/ Family Planning: An IUD is likely a good option for pt --she will have that 9-10-18.        PLAN:                            1. FYI--congrats your weight today is 370.2lbs --down 12 lbs. from may-21 -2018.  Lets increase your Ozempic dose to 1mg. /week now.   Keep working great diet and exercise plan !           Next MTM visit:  See me in 1 month for f/up-- Monday September 17th at 3;30pm.       I spent 30 minutes with this patient today. All changes were made via collaborative practice agreement with Joe Ramirez. A copy of the visit note was provided to the patient's primary care provider.    The patient was given a summary of these recommendations as an after visit summary.     Kirby Peralta Rph.  Medication Therapy Management Provider  [phone]

## 2018-08-13 NOTE — PATIENT INSTRUCTIONS
Recommendations from today's MTM visit:                                                        1. FYI--congrats your weight today is 370.2lbs --down 12 lbs. from may-21 -2018.  Lets increase your Ozempic dose to 1mg. /week now.   Keep working great diet and exercise plan !           Next MTM visit:  See me in 1 month for f/up-- Monday September 17th at 3;30pm.     To schedule another MTM appointment, please call the clinic directly or you may call the MTM scheduling line at [phone] or toll-free at [phone].     My Clinical Pharmacist's contact information:                                                      It was a pleasure seeing you today!  Please feel free to contact me with any questions or concerns you have.      Kirby Peralta [facility].  Medication Therapy Management Provider  [phone]      You may receive a survey about the MTM services you received.  I would appreciate your feedback to help me serve you better in the future. Please fill it out and return it when you can. Your comments will be anonymous.

## 2018-08-24 ENCOUNTER — MYC MEDICAL ADVICE (OUTPATIENT)
Dept: PHARMACY | Facility: CLINIC | Age: 28
End: 2018-08-24

## 2018-08-24 NOTE — TELEPHONE ENCOUNTER
8-24-18      Both are SE's-let me know if you need to lower the dose or want to try current dose 1mg./week  for a few more weeks?    Kirby Peralta [facility].  Medication Therapy Management Provider  [phone]

## 2018-08-30 ENCOUNTER — VIRTUAL VISIT (OUTPATIENT)
Dept: FAMILY MEDICINE | Facility: OTHER | Age: 28
End: 2018-08-30

## 2018-08-30 NOTE — PROGRESS NOTES
"Date:   Clinician: Abdiaziz Khanna  Clinician NPI: [id number]  Patient: Zenobia Cantu  Patient : 1990  Patient Address: [address] [place] [address]  Patient Phone: [phone]  Visit Protocol: IBS  Patient Summary:  Zenobia is a 28 year old ( : 1990 ) female who initiated a Visit for evaluation of IBS. When asked the question \"Please sign me up to receive news, health information and promotions from [employer].\", Zenobia responded \"No\".    In the last 3 months, she notes experiencing abdominal pain or discomfort two to three days a month. She has been experiencing discomfort or pain for 6 months or longer. With regard to the abdominal pain, the patient notes:     The pain most of the time got better or stopped completely after a bowl movement    Often having more frequent bowel movements after the pain started    Often having less frequent bowel movements after the pain started    Never having looser or less solid stools when the pain started    Most of the time having harder stools when the pain started     In the last 3 months, she experienced the following:     Hard or lumpy stools: about 50% of the time     Loose, mushy or watery stools: about 50% of the time     Blood in the stool: never     Black stools: never     Vomited blood: never      The patient denies the following: back pain associated with stomach symptoms, severe sharp abdominal pain, unintentional weight loss, heartburn, reflux, increased urination frequency, diarrhea that interferes with sleep, abdominal pain that interferes with sleep, recent abdominal trauma or injury, feeling feverish, and pain with urination.   To treat these symptoms, the patient made the following diet modifications:   Fiber supplements (did not improve symptoms)    The patient does not smoke or use smokeless tobacco.   She denies pregnancy and denies breastfeeding. She has menstruated in the past month.   Additional information as reported " by the patient (free text): [place] and on tuesday Morning I had vomiting and diarrhea. The vomiting stopped but I have been having diarrhea since Tuesday morning; changing to mostly a liquid. the color changes from yellow to green. I have been able to eat some soup and drink some water, but everything seems to get agitated again. My urine is clear, so am not dehydrated. Is it possible it is food poisoning? My stomach gets offley rummbly at times as well.    MEDICATIONS: olmesartan-hydrochlorothiazide oral, spironolactone-hydrochlorothiazide oral, metformin oral, amlodipine-benazepril oral, Ozempic subcutaneous, ALLERGIES: amoxicillin  Clinician Response:  Dear Zenobia,  I am sorry you are not feeling well. To determine the most appropriate care for you, I would like you to be seen in person to further discuss your health history and symptoms.  You will not be charged for this Visit. Thank you for trusting us with your care.   Diagnosis: Refer for additional evaluation  Diagnosis ICD: R69  Additional Clinician Notes: I would encourage you to be seen in a clinic or urgent care.   They can give you something for the diarrhea.   You may need to collect some stool cultures too.  This would be helpful to check for food poisoning.

## 2018-08-31 ENCOUNTER — MYC MEDICAL ADVICE (OUTPATIENT)
Dept: PHARMACY | Facility: CLINIC | Age: 28
End: 2018-08-31

## 2018-08-31 DIAGNOSIS — R73.03 PREDIABETES: Primary | ICD-10-CM

## 2018-08-31 NOTE — TELEPHONE ENCOUNTER
8-31-18          Is it possible to move back the ozempic to 0.5 mg? I have tried another week and it hasn't worked; still nauseous.     Thanks.    mtm will re-order her the lower dose ozempic --0.5mg now.    Kirby Peralta [facility].  Medication Therapy Management Provider  [phone]

## 2018-09-10 ENCOUNTER — OFFICE VISIT (OUTPATIENT)
Dept: FAMILY MEDICINE | Facility: CLINIC | Age: 28
End: 2018-09-10
Payer: COMMERCIAL

## 2018-09-10 VITALS
HEART RATE: 91 BPM | TEMPERATURE: 98.2 F | OXYGEN SATURATION: 100 % | DIASTOLIC BLOOD PRESSURE: 74 MMHG | BODY MASS INDEX: 39.68 KG/M2 | WEIGHT: 293 LBS | HEIGHT: 72 IN | SYSTOLIC BLOOD PRESSURE: 134 MMHG | RESPIRATION RATE: 20 BRPM

## 2018-09-10 DIAGNOSIS — Z12.4 SCREENING FOR MALIGNANT NEOPLASM OF CERVIX: ICD-10-CM

## 2018-09-10 DIAGNOSIS — Z00.00 ROUTINE HISTORY AND PHYSICAL EXAMINATION OF ADULT: Primary | ICD-10-CM

## 2018-09-10 DIAGNOSIS — Z30.430 ENCOUNTER FOR IUD INSERTION: ICD-10-CM

## 2018-09-10 PROCEDURE — G0145 SCR C/V CYTO,THINLAYER,RESCR: HCPCS | Performed by: FAMILY MEDICINE

## 2018-09-10 PROCEDURE — 99395 PREV VISIT EST AGE 18-39: CPT | Mod: 25 | Performed by: FAMILY MEDICINE

## 2018-09-10 PROCEDURE — 58300 INSERT INTRAUTERINE DEVICE: CPT | Performed by: FAMILY MEDICINE

## 2018-09-10 ASSESSMENT — PATIENT HEALTH QUESTIONNAIRE - PHQ9
SUM OF ALL RESPONSES TO PHQ QUESTIONS 1-9: 1
SUM OF ALL RESPONSES TO PHQ QUESTIONS 1-9: 1
10. IF YOU CHECKED OFF ANY PROBLEMS, HOW DIFFICULT HAVE THESE PROBLEMS MADE IT FOR YOU TO DO YOUR WORK, TAKE CARE OF THINGS AT HOME, OR GET ALONG WITH OTHER PEOPLE: NOT DIFFICULT AT ALL

## 2018-09-10 ASSESSMENT — ANXIETY QUESTIONNAIRES
GAD7 TOTAL SCORE: 7
1. FEELING NERVOUS, ANXIOUS, OR ON EDGE: SEVERAL DAYS
7. FEELING AFRAID AS IF SOMETHING AWFUL MIGHT HAPPEN: NOT AT ALL
2. NOT BEING ABLE TO STOP OR CONTROL WORRYING: MORE THAN HALF THE DAYS
5. BEING SO RESTLESS THAT IT IS HARD TO SIT STILL: NOT AT ALL
GAD7 TOTAL SCORE: 7
3. WORRYING TOO MUCH ABOUT DIFFERENT THINGS: MORE THAN HALF THE DAYS
4. TROUBLE RELAXING: SEVERAL DAYS
6. BECOMING EASILY ANNOYED OR IRRITABLE: SEVERAL DAYS
7. FEELING AFRAID AS IF SOMETHING AWFUL MIGHT HAPPEN: NOT AT ALL
GAD7 TOTAL SCORE: 7

## 2018-09-10 NOTE — PATIENT INSTRUCTIONS

## 2018-09-10 NOTE — PROGRESS NOTES
SUBJECTIVE:   [facility]  Answers for HPI/ROS submitted by the patient on 9/10/2018   Annual Exam:  Getting at least 3 servings of Calcium per day:: Yes  Bi-annual eye exam:: Yes  Dental care twice a year:: Yes  Sleep apnea or symptoms of sleep apnea:: Sleep apnea  Diet:: Low salt  Frequency of exercise:: 4-5 days/week  Taking medications regularly:: Yes  Medication side effects:: None  Additional concerns today:: No  PHQ-2 Score: 0  Duration of exercise:: 30-45 minutes  If you checked off any problems, how difficult have these problems made it for you to do your work, take care of things at home, or get along with other people?: Not difficult at all  PHQ9 TOTAL SCORE: 1  RUBEN 7 TOTAL SCORE: 7

## 2018-09-10 NOTE — MR AVS SNAPSHOT
After Visit Summary   9/10/2018    Zenobia Cantu    MRN: [id number]           Patient Information     Date Of Birth          1990        Visit Information        Provider Department      9/10/2018 3:30 PM Katy Durant MD [facility]        Today's Diagnoses     Routine history and physical examination of adult    -  1    Screening for malignant neoplasm of cervix        Encounter for IUD insertion          Care Instructions      Preventive Health Recommendations  Female Ages 26 - 39  Yearly exam:   See your health care provider every year in order to    Review health changes.     Discuss preventive care.      Review your medicines if you your doctor has prescribed any.    Until age 30: Get a Pap test every three years (more often if you have had an abnormal result).    After age 30: Talk to your doctor about whether you should have a Pap test every 3 years or have a Pap test with HPV screening every 5 years.   You do not need a Pap test if your uterus was removed (hysterectomy) and you have not had cancer.  You should be tested each year for STDs (sexually transmitted diseases), if you're at risk.   Talk to your provider about how often to have your cholesterol checked.  If you are at risk for diabetes, you should have a diabetes test (fasting glucose).  Shots: Get a flu shot each year. Get a tetanus shot every 10 years.   Nutrition:     Eat at least 5 servings of fruits and vegetables each day.    Eat whole-grain bread, whole-wheat pasta and brown rice instead of white grains and rice.    Get adequate Calcium and Vitamin D.     Lifestyle    Exercise at least 150 minutes a week (30 minutes a day, 5 days of the week). This will help you control your weight and prevent disease.    Limit alcohol to one drink per day.    No smoking.     Wear sunscreen to prevent skin cancer.    See your dentist every six months for an exam and cleaning.      Preventive Health Recommendations  Female  Ages 26 - 39  Yearly exam:   See your health care provider every year in order to    Review health changes.     Discuss preventive care.      Review your medicines if you your doctor has prescribed any.    Until age 30: Get a Pap test every three years (more often if you have had an abnormal result).    After age 30: Talk to your doctor about whether you should have a Pap test every 3 years or have a Pap test with HPV screening every 5 years.   You do not need a Pap test if your uterus was removed (hysterectomy) and you have not had cancer.  You should be tested each year for STDs (sexually transmitted diseases), if you're at risk.   Talk to your provider about how often to have your cholesterol checked.  If you are at risk for diabetes, you should have a diabetes test (fasting glucose).  Shots: Get a flu shot each year. Get a tetanus shot every 10 years.   Nutrition:     Eat at least 5 servings of fruits and vegetables each day.    Eat whole-grain bread, whole-wheat pasta and brown rice instead of white grains and rice.    Get adequate Calcium and Vitamin D.     Lifestyle    Exercise at least 150 minutes a week (30 minutes a day, 5 days of the week). This will help you control your weight and prevent disease.    Limit alcohol to one drink per day.    No smoking.     Wear sunscreen to prevent skin cancer.    See your dentist every six months for an exam and cleaning.            Follow-ups after your visit        Your next 10 appointments already scheduled     Sep 17, 2018  3:30 PM CDT   SHORT with Kirby Peralta RPH   [facility]    [address] [address] [phone]              Who to contact     If you have questions or need follow up information about today's clinic visit or your schedule please contact [facility] directly at [phone].  Normal or non-critical lab and imaging results will be communicated to you  "by [employer], letter or phone within 4 business days after the clinic has received the results. If you do not hear from us within 7 days, please contact the clinic through [employer] or phone. If you have a critical or abnormal lab result, we will notify you by phone as soon as possible.  Submit refill requests through [employer] or call your pharmacy and they will forward the refill request to us. Please allow 3 business days for your refill to be completed.          Additional Information About Your Visit        [employer] Information     [employer] gives you secure access to your electronic health record. If you see a primary care provider, you can also send messages to your care team and make appointments. If you have questions, please call your primary care clinic.  If you do not have a primary care provider, please call [phone] and they will assist you.        Care EveryWhere ID     This is your Care EveryWhere ID. This could be used by other organizations to access your [place] medical records  [id number]        Your Vitals Were     Pulse Temperature Respirations Height Last Period Pulse Oximetry    91 98.2  F (36.8  C) (Oral) 20 6' 1\" (1.854 m) 08/31/2018 (Approximate) 100%    Breastfeeding? BMI (Body Mass Index)                No 48.68 kg/m2           Blood Pressure from Last 3 Encounters:   09/10/18 134/74   08/13/18 130/72   07/23/18 136/76    Weight from Last 3 Encounters:   09/10/18 (!) 369 lb (167.4 kg)   08/13/18 (!) 370 lb 9.6 oz (168.1 kg)   07/23/18 (!) 369 lb (167.4 kg)              We Performed the Following     Pap imaged thin layer screen reflex to HPV if ASCUS - recommend age 25 - 29          Today's Medication Changes          These changes are accurate as of 9/10/18  4:28 PM.  If you have any questions, ask your nurse or doctor.               Start taking these medicines.        Dose/Directions    levonorgestrel 20 MCG/24HR IUD   Commonly known as:  MIRENA   Used for:  Encounter for IUD insertion "   Started by:  Katy Durant MD        Dose:  1 each   1 each (20 mcg) by Intrauterine route once for 1 dose   Quantity:  1 each   Refills:  0         These medicines have changed or have updated prescriptions.        Dose/Directions    metFORMIN 500 MG 24 hr tablet   Commonly known as:  GLUCOPHAGE-XR   This may have changed:    - how much to take  - how to take this  - when to take this  - additional instructions   Used for:  IFG (impaired fasting glucose)        TAKE 2 TABLET(500 MG) BY MOUTH TWICE DAILY WITH MEALS   Quantity:  360 tablet   Refills:  3            Where to get your medicines      Some of these will need a paper prescription and others can be bought over the counter.  Ask your nurse if you have questions.     You don't need a prescription for these medications     levonorgestrel 20 MCG/24HR IUD                Primary Care Provider Office Phone # Fax #    Joe Ramirez PA-C [phone]  [facility] [address]        Equal Access to Services     [facility] : Hadii valerio farley hadasho Soomaali, waaxda luqadaha, qaybta kaalmada adeegyada, ronel kerr . So [facility] [phone].    ATENCIÓN: Si habla español, tiene a prescott disposición servicios gratuitos de asistencia lingüística. Llame al [phone].    We comply with applicable federal civil rights laws and [place] laws. We do not discriminate on the basis of race, color, national origin, age, disability, sex, sexual orientation, or gender identity.            Thank you!     Thank you for choosing [facility]  for your care. Our goal is always to provide you with excellent care. Hearing back from our patients is one way we can continue to improve our services. Please take a few minutes to complete the written survey that you may receive in the mail after your visit with us. Thank you!             Your Updated Medication List - Protect others around you: Learn how to  safely use, store and throw away your medicines at www.disposemymeds.org.          This list is accurate as of 9/10/18  4:28 PM.  Always use your most recent med list.                   Brand Name Dispense Instructions for use Diagnosis    amLODIPine 10 MG tablet    NORVASC    90 tablet    Take 1 tablet (10 mg) by mouth daily    Essential hypertension       FLUoxetine 40 MG capsule    PROzac     TK 1 C PO D        hydrochlorothiazide 25 MG tablet    HYDRODIURIL    30 tablet    Take 1 tablet (25 mg) by mouth daily    Essential hypertension       levonorgestrel 20 MCG/24HR IUD    MIRENA    1 each    1 each (20 mcg) by Intrauterine route once for 1 dose    Encounter for IUD insertion       metFORMIN 500 MG 24 hr tablet    GLUCOPHAGE-XR    360 tablet    TAKE 2 TABLET(500 MG) BY MOUTH TWICE DAILY WITH MEALS    IFG (impaired fasting glucose)       misoprostol 200 MCG tablet    CYTOTEC    1 tablet    Take one tab one hour prior to procedure    Counseling for birth control regarding intrauterine device (IUD)       NORLYDA 0.35 MG per tablet   Generic drug:  norethindrone     84 tablet    TAKE 1 TABLET(0.35 MG) BY MOUTH DAILY    Encounter for surveillance of contraceptive pills       olmesartan 20 MG tablet    BENICAR    180 tablet    Take 1 tablet (20 mg) by mouth 2 times daily    Essential hypertension       Semaglutide 0.25 or 0.5 MG/DOSE Sopn     1.5 mL    Inject 0.5 mg Subcutaneous once a week    Prediabetes       spironolactone 25 MG tablet    ALDACTONE    30 tablet    Take 1 tablet (25 mg) by mouth daily    Essential hypertension       vitamin D 2000 units Caps      Take 2,000 Units by mouth daily

## 2018-09-10 NOTE — PROGRESS NOTES
SUBJECTIVE:   CC: Zenobia Cantu is an 28 year old woman who presents for preventive health visit.     Physical   Annual:     Getting at least 3 servings of Calcium per day:  Yes    Bi-annual eye exam:  Yes    Dental care twice a year:  Yes    Sleep apnea or symptoms of sleep apnea:  Sleep apnea    Diet:  Low salt    Frequency of exercise:  4-5 days/week    Duration of exercise:  30-45 minutes    Taking medications regularly:  Yes    Medication side effects:  None    Additional concerns today:  No        Today's PHQ-2 Score:   PHQ-2 ( 1999 [employer]) 9/9/2018   Q1: Little interest or pleasure in doing things 0   Q2: Feeling down, depressed or hopeless 0   PHQ-2 Score 0   Q1: Little interest or pleasure in doing things Not at all   Q2: Feeling down, depressed or hopeless Not at all   PHQ-2 Score 0       Abuse: Current or Past(Physical, Sexual or Emotional)- Not in the last 7 yrs  Do you feel safe in your environment - Yes    Social History   Substance Use Topics     Smoking status: Never Smoker     Smokeless tobacco: Never Used     Alcohol use Yes      Comment: rarely     Alcohol Use 9/9/2018   If you drink alcohol do you typically have greater than 3 drinks per day OR greater than 7 drinks per week? Not Applicable       Reviewed orders with patient.  Reviewed health maintenance and updated orders accordingly - Yes      Mammogram not appropriate for this patient based on age.    Pertinent mammograms are reviewed under the imaging tab.  History of abnormal Pap smear: NO - age 30- 65 PAP every 3 years recommended     Reviewed and updated as needed this visit by clinical staff         Reviewed and updated as needed this visit by Provider        Subjective:  Zenobia Cantu is a 28 year old female  who presents today for a Physical Exam.  She has not had an abnormal PAP smear.  She has had her cholesterol checked in the past and it was good last year.  Her last mammogram was never. She has no concerns at this time.  She would  "like to have IUD placed for birth control and heavy periods.        Current Outpatient Prescriptions   Medication Sig Dispense Refill     amLODIPine (NORVASC) 10 MG tablet Take 1 tablet (10 mg) by mouth daily 90 tablet 1     Cholecalciferol (VITAMIN D) 2000 units CAPS Take 2,000 Units by mouth daily       FLUoxetine (PROZAC) 40 MG capsule TK 1 C PO D  4     hydrochlorothiazide (HYDRODIURIL) 25 MG tablet Take 1 tablet (25 mg) by mouth daily 30 tablet 1     metFORMIN (GLUCOPHAGE-XR) 500 MG 24 hr tablet TAKE 2 TABLET(500 MG) BY MOUTH TWICE DAILY WITH MEALS (Patient taking differently: Take 1,000 mg by mouth 2 times daily (with meals) ) 360 tablet 3     misoprostol (CYTOTEC) 200 MCG tablet Take one tab one hour prior to procedure 1 tablet 0     NORLYDA 0.35 MG per tablet TAKE 1 TABLET(0.35 MG) BY MOUTH DAILY 84 tablet 0     olmesartan (BENICAR) 20 MG tablet Take 1 tablet (20 mg) by mouth 2 times daily 180 tablet 1     Semaglutide 0.25 or 0.5 MG/DOSE SOPN Inject 0.5 mg Subcutaneous once a week 1.5 mL 5     spironolactone (ALDACTONE) 25 MG tablet Take 1 tablet (25 mg) by mouth daily 30 tablet 1       Past Medical History:   Diagnosis Date     Depressive disorder 2010     Essential hypertension 8/28/2017       History reviewed. No pertinent surgical history.    Family History   Problem Relation Age of Onset     Hypertension Father      He lives a healthy lifestyle     Depression Father      Hypertension Paternal Grandmother      Hypertension Other      Believe my grandmother's mom had problems too       Social History   Substance Use Topics     Smoking status: Never Smoker     Smokeless tobacco: Never Used     Alcohol use Yes      Comment: rarely       Objective:  Blood pressure 134/74, pulse 91, temperature 98.2  F (36.8  C), temperature source Oral, resp. rate 20, height 6' 1\" (1.854 m), weight (!) 369 lb (167.4 kg), last menstrual period 08/31/2018, SpO2 100 %, not currently breastfeeding.  Body mass index is 48.68 " "kg/(m^2).  General appearance: Healthy.  Mental Status: cooperative, normal affect, no gross thought process defects.  HEENT:   Ears- Normal external canals and TMs  Eyes- PERRL, EOM's intact, fundi benign, sharp disc margins.  Throat- Normal   Neck- no carotid bruits noted  Nodes- Negative  Thyroid: Normal to palpation, no enlargement or nodules noted.  Breasts: Symmetric without mass tenderness or discharge.  Axillary nodes negative.  Lungs: Clear to auscultation bilaterally  Heart.: Normal rate and rhythm.  No murmurs, clicks or gallops.  Pulses:    R-4/4, PT-4/4, DP-4/4  Abdomen: BS active. Soft, non-tender, no masses or organomegaly.  Aorta normal. No bruits.  Genitalia: Normal female external genitalia without lesions.  Speculum:  Cervix normal,  Pap taken, Bimanual exam - normal size uterus, no adenexal mass or tenderness.  Extremities: Normal without edema  Reflexes:  Symmetric bilaterally at the patella  Skin: Clear and free of rash.    Assessment:  1. Zenobia Cantu is a healthy 28 year old female here for health care maintainence issues.  2. Birth control - would like IUD    Plan:  1. A Pap smear was obtained  2. Labs ordered per [facility] orders  3. Screening Mammogram was NOT ordered  4. See IUD placement below  5. Declined flu shot at this time  6.  Patient is to follow-up in 1 year and as needed.      Review of Systems  Physical Exam      COUNSELING:  Reviewed preventive health counseling, as reflected in patient instructions  Special attention given to:        Regular exercise       Healthy diet/nutrition    BP Readings from Last 1 Encounters:   08/13/18 130/72     Estimated body mass index is 48.89 kg/(m^2) as calculated from the following:    Height as of 3/19/18: 6' 1\" (1.854 m).    Weight as of 8/13/18: 370 lb 9.6 oz (168.1 kg).           reports that she has never smoked. She has never used smokeless tobacco.      Counseling Resources:  ATP IV Guidelines  Pooled Cohorts Equation Calculator  Breast " Cancer Risk Calculator  FRAX Risk Assessment  ICSI Preventive Guidelines  Dietary Guidelines for Americans, 2010  [employer]'s My[employer]ate  ASA Prophylaxis  Lung CA Screening    Katy Durant MD  [facility]  Answers for HPI/ROS submitted by the patient on 9/10/2018   PHQ-2 Score: 0  If you checked off any problems, how difficult have these problems made it for you to do your work, take care of things at home, or get along with other people?: Not difficult at all  PHQ9 TOTAL SCORE: 1  RUBEN 7 TOTAL SCORE: 7    Subjective:  Zenobia Cantu is a 28 year old woman who is here for an IUD insertion.  She has been counseled on the risks and benefits as well as the efficacy of this method of birth control.  She understands and consents to placement.    Past Medical History:   Diagnosis Date     Depressive disorder 2010     Essential hypertension 8/28/2017       History reviewed. No pertinent surgical history.     Current Outpatient Prescriptions   Medication Sig Dispense Refill     amLODIPine (NORVASC) 10 MG tablet Take 1 tablet (10 mg) by mouth daily 90 tablet 1     Cholecalciferol (VITAMIN D) 2000 units CAPS Take 2,000 Units by mouth daily       FLUoxetine (PROZAC) 40 MG capsule TK 1 C PO D  4     hydrochlorothiazide (HYDRODIURIL) 25 MG tablet Take 1 tablet (25 mg) by mouth daily 30 tablet 1     metFORMIN (GLUCOPHAGE-XR) 500 MG 24 hr tablet TAKE 2 TABLET(500 MG) BY MOUTH TWICE DAILY WITH MEALS (Patient taking differently: Take 1,000 mg by mouth 2 times daily (with meals) ) 360 tablet 3     misoprostol (CYTOTEC) 200 MCG tablet Take one tab one hour prior to procedure 1 tablet 0     NORLYDA 0.35 MG per tablet TAKE 1 TABLET(0.35 MG) BY MOUTH DAILY 84 tablet 0     olmesartan (BENICAR) 20 MG tablet Take 1 tablet (20 mg) by mouth 2 times daily 180 tablet 1     Semaglutide 0.25 or 0.5 MG/DOSE SOPN Inject 0.5 mg Subcutaneous once a week 1.5 mL 5     spironolactone (ALDACTONE) 25 MG tablet Take 1 tablet (25 mg) by mouth daily  "30 tablet 1       Social History   Substance Use Topics     Smoking status: Never Smoker     Smokeless tobacco: Never Used     Alcohol use Yes      Comment: rarely       Objective:   Blood pressure 134/74, pulse 91, temperature 98.2  F (36.8  C), temperature source Oral, resp. rate 20, height 6' 1\" (1.854 m), weight (!) 369 lb (167.4 kg), last menstrual period 08/31/2018, SpO2 100 %, not currently breastfeeding.  Pelvic: reveals normal sized uterus which is antiverted - there is no tenderness and no adnexal masses felt  The cervix is NULLIPAROUS  Sterile technique used and vagina and cervix cleansed with betadine  After the tenaculum was placed the uterine sound was inserted to 6.5 cm with no complications - a Mirena IUD was placed and the string cut at 3cm  The patient tolerated the procedure well.  She had some cramping with the placement of the IUD however it slid in without any resistance quit easily    Assessment:  IUD placement    Plan:   The patient was sent home with information on the Mirena and told to check for the string after each menses  If she experiences symptoms of a vaginal infection, she should be seen  The Mirena needs to be replaced after 5 years      "

## 2018-09-11 ASSESSMENT — PATIENT HEALTH QUESTIONNAIRE - PHQ9: SUM OF ALL RESPONSES TO PHQ QUESTIONS 1-9: 1

## 2018-09-11 ASSESSMENT — ANXIETY QUESTIONNAIRES: GAD7 TOTAL SCORE: 7

## 2018-09-14 LAB
COPATH REPORT: NORMAL
PAP: NORMAL

## 2018-09-17 ENCOUNTER — OFFICE VISIT (OUTPATIENT)
Dept: PHARMACY | Facility: CLINIC | Age: 28
End: 2018-09-17
Payer: COMMERCIAL

## 2018-09-17 VITALS
WEIGHT: 293 LBS | OXYGEN SATURATION: 96 % | SYSTOLIC BLOOD PRESSURE: 134 MMHG | HEART RATE: 91 BPM | DIASTOLIC BLOOD PRESSURE: 82 MMHG | BODY MASS INDEX: 48.45 KG/M2

## 2018-09-17 DIAGNOSIS — F33.0 MAJOR DEPRESSIVE DISORDER, RECURRENT EPISODE, MILD (H): Primary | ICD-10-CM

## 2018-09-17 DIAGNOSIS — Z30.014 ENCOUNTER FOR INITIAL PRESCRIPTION OF INTRAUTERINE CONTRACEPTIVE DEVICE (IUD): ICD-10-CM

## 2018-09-17 PROCEDURE — 99606 MTMS BY PHARM EST 15 MIN: CPT | Performed by: PHARMACIST

## 2018-09-17 PROCEDURE — 99607 MTMS BY PHARM ADDL 15 MIN: CPT | Performed by: PHARMACIST

## 2018-09-17 RX ORDER — FLUOXETINE 40 MG/1
CAPSULE ORAL
Qty: 90 CAPSULE | Refills: 1 | Status: SHIPPED | OUTPATIENT
Start: 2018-09-17 | End: 2019-03-13

## 2018-09-17 NOTE — MR AVS SNAPSHOT
After Visit Summary   9/17/2018    Zenobia Cantu    MRN: [id number]           Patient Information     Date Of Birth          1990        Visit Information        Provider Department      9/17/2018 3:30 PM Kirby Peralta RPH [facility]        Today's Diagnoses     Major depressive disorder, recurrent episode, mild (H)    -  1      Care Instructions    Recommendations from today's MT visit:                                                        1. FYI--Weight today is 367.2lbs --Congratulations --you've lost 15 lbs on ozempic !   Please stay on 0.5mg./weekly dose.     2. BP = 134/82mmhg. -ok stay on all same blood pressure meds as is.         Next MTM visit:  December- 3rd -2018 at 3;30pm.     To schedule another MTM appointment, please call the clinic directly or you may call the MTM scheduling line at [phone] or toll-free at [phone].     My Clinical Pharmacist's contact information:                                                      It was a pleasure seeing you today!  Please feel free to contact me with any questions or concerns you have.      Kirby Peralta Rph.  Medication Therapy Management Provider  [phone]      You may receive a survey about the MTM services you received.  I would appreciate your feedback to help me serve you better in the future. Please fill it out and return it when you can. Your comments will be anonymous.                    Follow-ups after your visit        Your next 10 appointments already scheduled     Dec 03, 2018  3:30 PM CST   SHORT with Kirby Peralta RPH   [facility] ([facility])    [address] [address] [place] [id number] [phone]              Who to contact     If you have questions or need follow up information about today's clinic visit or your schedule please contact [facility] directly at [phone].  Normal or non-critical lab and imaging  results will be communicated to you by [employer], letter or phone within 4 business days after the clinic has received the results. If you do not hear from us within 7 days, please contact the clinic through [employer] or phone. If you have a critical or abnormal lab result, we will notify you by phone as soon as possible.  Submit refill requests through [employer] or call your pharmacy and they will forward the refill request to us. Please allow 3 business days for your refill to be completed.          Additional Information About Your Visit        Chilltime Information     [employer] gives you secure access to your electronic health record. If you see a primary care provider, you can also send messages to your care team and make appointments. If you have questions, please call your primary care clinic.  If you do not have a primary care provider, please call [phone] and they will assist you.        Care EveryWhere ID     This is your Care EveryWhere ID. This could be used by other organizations to access your [facility] medical records  [id number]        Your Vitals Were     Pulse Last Period Pulse Oximetry BMI (Body Mass Index)          91 08/31/2018 (Approximate) 96% 48.45 kg/m2         Blood Pressure from Last 3 Encounters:   09/17/18 134/82   09/10/18 134/74   08/13/18 130/72    Weight from Last 3 Encounters:   09/17/18 (!) 367 lb 3.2 oz (166.6 kg)   09/10/18 (!) 369 lb (167.4 kg)   08/13/18 (!) 370 lb 9.6 oz (168.1 kg)              Today, you had the following     No orders found for display         Today's Medication Changes          These changes are accurate as of 9/17/18  3:49 PM.  If you have any questions, ask your nurse or doctor.               These medicines have changed or have updated prescriptions.        Dose/Directions    metFORMIN 500 MG 24 hr tablet   Commonly known as:  GLUCOPHAGE-XR   This may have changed:    - how much to take  - how to take this  - when to take this  - additional instructions    Used for:  IFG (impaired fasting glucose)        TAKE 2 TABLET(500 MG) BY MOUTH TWICE DAILY WITH MEALS   Quantity:  360 tablet   Refills:  3            Where to get your medicines      These medications were sent to [id number] - [place], [place] - [address] AT [facility] [address] [facility] [address]     Phone:  [phone]     FLUoxetine 40 MG capsule                Primary Care Provider Office Phone # Fax #    Joe Ramirez PA-C [phone]       [id number]  [facility] [address]        Equal Access to Services     [facility] : Hadii aad ku hadasho Soomaali, waaxda luqadaha, qaybta kaalmada adeegyada, waxdavid calderain hayaan adeeg lillian kerr . So [facility] [phone].    ATENCIÓN: Si habla español, tiene a prescott disposición servicios gratuitos de asistencia lingüística. [facility] [phone].    We comply with applicable federal civil rights laws and [place] laws. We do not discriminate on the basis of race, color, national origin, age, disability, sex, sexual orientation, or gender identity.            Thank you!     Thank you for choosing [facility]  for your care. Our goal is always to provide you with excellent care. Hearing back from our patients is one way we can continue to improve our services. Please take a few minutes to complete the written survey that you may receive in the mail after your visit with us. Thank you!             Your Updated Medication List - Protect others around you: Learn how to safely use, store and throw away your medicines at www.disposemymeds.org.          This list is accurate as of 9/17/18  3:49 PM.  Always use your most recent med list.                   Brand Name Dispense Instructions for use Diagnosis    amLODIPine 10 MG tablet    NORVASC    90 tablet    Take 1 tablet (10 mg) by mouth daily    Essential hypertension       FLUoxetine 40 MG capsule    PROzac    90 capsule    TK 1 C PO D     Major depressive disorder, recurrent episode, mild (H)       hydrochlorothiazide 25 MG tablet    HYDRODIURIL    30 tablet    Take 1 tablet (25 mg) by mouth daily    Essential hypertension       levonorgestrel 20 MCG/24HR IUD    MIRENA    1 each    1 each (20 mcg) by Intrauterine route once for 1 dose    Encounter for IUD insertion       metFORMIN 500 MG 24 hr tablet    GLUCOPHAGE-XR    360 tablet    TAKE 2 TABLET(500 MG) BY MOUTH TWICE DAILY WITH MEALS    IFG (impaired fasting glucose)       olmesartan 20 MG tablet    BENICAR    180 tablet    Take 1 tablet (20 mg) by mouth 2 times daily    Essential hypertension       Semaglutide 0.25 or 0.5 MG/DOSE Sopn     1.5 mL    Inject 0.5 mg Subcutaneous once a week    Prediabetes       spironolactone 25 MG tablet    ALDACTONE    30 tablet    Take 1 tablet (25 mg) by mouth daily    Essential hypertension       vitamin D 2000 units Caps      Take 2,000 Units by mouth daily

## 2018-09-17 NOTE — PROGRESS NOTES
"SUBJECTIVE/OBJECTIVE:                           Zenobia Cantu is a 28 year old female coming in for an f/up (8-13-18) visit for Medication Therapy Management.  She was referred to me from Joe Ramirez/BP gap program at [facility] .     Chief Complaint:  Here for ozempic f/up(she loves it ) + bp recheck.   States she had her IUD placed last week.     .  Personal Healthcare Goals:  Fix BP, \"get rid of all medications\"    Allergies/ADRs: Reviewed in Epic  Tobacco: No tobacco use  Alcohol: not currently using  Caffeine: now drinks about a couple of sodas each week (previously 20ounce sodas/day)  Activity:  30 minutes /day on treadmill /elliptical 5 days /week -gym .  PMH: Reviewed in Epic; family hx --father has hi BP.      Medication Adherence/Access:  no issues reported    Hypertension: Current medications include: 12.5mg spironolactone every morning,  amlodipine 10mg. Hs., hctz 25mg. Qam,  olmesartan 20mg bid .  Patient does self-monitor BP. Pt. Self reports home BP's: 120/73 - 128/82 P = 70-80s    BP Readings from Last 3 Encounters:   09/17/18 134/82   09/10/18 134/74   08/13/18 130/72         Weight loss/ prediabetes: Current therapy is metformin-2x 500 mg. BID and Ozempic 0.5 mg once weekly(feels fullness-eating way less food now per meal). Feels fine gi wise.  fyi- she tried the 1mg./week ozempic --too much nausea . GI issues.     Exercise - not as much last month --has food poisoning , IUD , ozempic 1mg issue.  Goes to gym 5 days /week - 30 minutes/day-when not feeling ill.     Last a1c 5.9%.    Contraception/ Family Planning: Current therapy: Just IUD now --off ocp. She is hopeful the IUD will work out as she is frustrated with inconsistent periods and feels that she cannot trust her current birth control pill, which is also frustrating to her .         ASSESSMENT:                             Current medications were reviewed today.     Medication Adherence: excellent, no issues " identified    Hypertension: Stable. Patient is meeting BP goal of < 140/90mmHg.   Pt would benefit from the following changes - continued BP monitoring, watching diet, increasing exercise and weight loss and sodium restriction        Weight loss/ prediabetes: Improving: 15 lbs. Weight loss since Ozempic start --she is doing great on diet and exercise plan , cannot tolerate more than 0.5mg/week Ozempic so she will stay on that dose.    Contraception/ Family Planning: Stable on IUD now.         PLAN:                            1. FYI--Weight today is 367.2lbs --Congratulations --you've lost 15 lbs on ozempic !   Please stay on 0.5mg./weekly dose.     2. BP = 134/82mmhg. -ok stay on all same blood pressure meds as is.         Next [facility] visit:  December- 3rd -2018 at 3;30pm.     I spent 30 minutes with this patient today. All changes were made via collaborative practice agreement with Joe Ramirez. A copy of the visit note was provided to the patient's primary care provider.    The patient was given a summary of these recommendations as an after visit summary.     Kirby Peralta Rph.  Medication Therapy Management Provider  [phone]

## 2018-09-17 NOTE — PATIENT INSTRUCTIONS
Recommendations from today's MTM visit:                                                        1. FYI--Weight today is 367.2lbs --Congratulations --you've lost 15 lbs on ozempic !   Please stay on 0.5mg./weekly dose.     2. BP = 134/82mmhg. -ok stay on all same blood pressure meds as is.         Next MTM visit:  December- 3rd -2018 at 3;30pm.     To schedule another MTM appointment, please call the clinic directly or you may call the MTM scheduling line at [phone] or toll-free at [phone].     My Clinical Pharmacist's contact information:                                                      It was a pleasure seeing you today!  Please feel free to contact me with any questions or concerns you have.      Kirby Peralta [facility].  Medication Therapy Management Provider  [phone]      You may receive a survey about the MTM services you received.  I would appreciate your feedback to help me serve you better in the future. Please fill it out and return it when you can. Your comments will be anonymous.

## 2018-09-19 ENCOUNTER — MYC MEDICAL ADVICE (OUTPATIENT)
Dept: FAMILY MEDICINE | Facility: CLINIC | Age: 28
End: 2018-09-19

## 2018-09-19 NOTE — TELEPHONE ENCOUNTER
That is not super typical.       Here is a list of the common side effects   missed periods (amenorrhea), bleeding and spotting between periods, heavier bleeding during the first few weeks after device insertion, abdominal/pelvic pain, ovarian cysts, back pain, headache/migraine, nervousness, dizziness, nausea, vomiting, bloating, breast tenderness or pain, weight gain, changes in hair growth, acne, depression, changes in mood, loss of interest in sex, itching or skin rash, and puffiness in the face, hands, ankles, or feet

## 2018-09-19 NOTE — TELEPHONE ENCOUNTER
LEONOR's message sent back to pt via [employer]. Shari Schoenberger, [facility]

## 2018-09-21 DIAGNOSIS — I10 ESSENTIAL HYPERTENSION: ICD-10-CM

## 2018-09-21 RX ORDER — SPIRONOLACTONE 25 MG/1
25 TABLET ORAL DAILY
Qty: 30 TABLET | Refills: 1 | Status: SHIPPED | OUTPATIENT
Start: 2018-09-21 | End: 2018-12-13

## 2018-09-21 NOTE — TELEPHONE ENCOUNTER
"Requested Prescriptions   Pending Prescriptions Disp Refills     spironolactone (ALDACTONE) 25 MG tablet    Last Written Prescription Date:  5/21/18  Last Fill Quantity: 30 tablet,  # refills: 1   Last office visit: 9/10/2018 with prescribing provider:  Bhargav   Future Office Visit:   Next 5 appointments (look out 90 days)     Oct 03, 2018  3:30 PM CDT   MyChart Short with Katy Durant MD   [facility]    [address] [place] [id number]   [phone]            Dec 03, 2018  3:30 PM CST   [facility]    [address] [place] [address]   [phone]                  30 tablet 1     Sig: Take 1 tablet (25 mg) by mouth daily    Diuretics (Including Combos) Protocol Passed    9/21/2018  8:00 AM       Passed - Blood pressure under 140/90 in past 12 months    BP Readings from Last 3 Encounters:   09/17/18 134/82   09/10/18 134/74   08/13/18 130/72                Passed - Recent (12 mo) or future (30 days) visit within the authorizing provider's specialty    Patient had office visit in the last 12 months or has a visit in the next 30 days with authorizing provider or within the authorizing provider's specialty.  See \"Patient Info\" tab in inbasket, or \"Choose Columns\" in Meds & Orders section of the refill encounter.           Passed - Patient is age 18 or older       Passed - No active pregancy on record       Passed - Normal serum creatinine on file in past 12 months    Recent Labs   Lab Test  06/18/18   1600   CR  0.87             Passed - Normal serum potassium on file in past 12 months    Recent Labs   Lab Test  06/18/18   1600   POTASSIUM  5.0                   Passed - Normal serum sodium on file in past 12 months    Recent Labs   Lab Test  06/18/18   1600   NA  137             Passed - No positive pregnancy test in past 12 months          "

## 2018-10-29 DIAGNOSIS — Z30.41 ENCOUNTER FOR SURVEILLANCE OF CONTRACEPTIVE PILLS: ICD-10-CM

## 2018-10-29 NOTE — TELEPHONE ENCOUNTER
"  Mirena was inserted 9/10/18      Requested Prescriptions   Pending Prescriptions Disp Refills     TRINESSA LO 0.18/0.215/0.25 MG-25 MCG per tablet [Pharmacy Med Name: TRINESSA LO TABS 28S] 84 tablet 0     (Discontinued)      Last Written Prescription Date:  4/30/17    END:7/31/17  Reason for Discontinue: Alternate therapy  Last Fill Quantity: ?,   # refills: 4  Last Office Visit with [facility] or [facility] prescribing provider: 9/10/2018  Bhargav       Return in about 1 year (around 9/10/2019) for Physical Exam.     Future Office Visit:    Next 5 appointments (look out 90 days)     Dec 03, 2018  3:30 PM CST   SHORT with Kirby Peralta [facility] ([facility])    [address] [facility] [address]   [phone]                Sig: TAKE 1 TABLET BY MOUTH DAILY    Contraceptives Protocol Passed    10/29/2018 12:59 PM       Passed - Patient is not a current smoker if age is 35 or older       Passed - Recent (12 mo) or future (30 days) visit within the authorizing provider's specialty    Patient had office visit in the last 12 months or has a visit in the next 30 days with authorizing provider or within the authorizing provider's specialty.  See \"Patient Info\" tab in inbasket, or \"Choose Columns\" in Meds & Orders section of the refill encounter.             Passed - No active pregnancy on record       Passed - No positive pregnancy test in past 12 months          "

## 2018-10-30 NOTE — TELEPHONE ENCOUNTER
Routing refill request to provider for review/approval because:  Mirena placed 9.10.18, is there a need for this also    Flor Resendiz RN, BS  Clinical Nurse Triage.

## 2018-11-21 ENCOUNTER — MYC MEDICAL ADVICE (OUTPATIENT)
Dept: PHARMACY | Facility: CLINIC | Age: 28
End: 2018-11-21

## 2018-11-21 DIAGNOSIS — R09.81 SINUS CONGESTION: Primary | ICD-10-CM

## 2018-11-21 RX ORDER — FLUTICASONE PROPIONATE 50 MCG
1-2 SPRAY, SUSPENSION (ML) NASAL DAILY
Qty: 1 BOTTLE | Refills: 11 | Status: SHIPPED | OUTPATIENT
Start: 2018-11-21 | End: 2019-08-13

## 2018-11-21 NOTE — TELEPHONE ENCOUNTER
11-21-18      I took some brand name sudefed and now I notice that my heart rate is faster than and my heart seems to pound once and a while. From what I found, that is a side effect of the sudefed. I just wanted to make sure with you and ask if there is anything else I could take for sinus relief.        mtm will prescribe flonase for patient . donot want her on sudafed.     Kirby Peralta [facility].  Medication Therapy Management Provider  [phone]

## 2018-12-03 ENCOUNTER — TRANSFERRED RECORDS (OUTPATIENT)
Dept: HEALTH INFORMATION MANAGEMENT | Facility: CLINIC | Age: 28
End: 2018-12-03

## 2018-12-07 ENCOUNTER — TELEPHONE (OUTPATIENT)
Dept: FAMILY MEDICINE | Facility: CLINIC | Age: 28
End: 2018-12-07

## 2018-12-07 DIAGNOSIS — I10 ESSENTIAL HYPERTENSION: ICD-10-CM

## 2018-12-07 RX ORDER — HYDROCHLOROTHIAZIDE 25 MG/1
TABLET ORAL
Qty: 30 TABLET | Refills: 0 | Status: CANCELLED | OUTPATIENT
Start: 2018-12-07

## 2018-12-07 NOTE — TELEPHONE ENCOUNTER
"Requested Prescriptions   Pending Prescriptions Disp Refills     hydrochlorothiazide (HYDRODIURIL) 25 MG tablet [Pharmacy Med Name: HYDROCHLOROTHIAZIDE 25MG TABLETS] 30 tablet 0    Last Written Prescription Date:  3/19/18  Last Fill Quantity: 30,  # refills: 1   Last Office Visit: 9/10/2018 Bhargav       Return in about 1 year (around 9/10/2019) for Physical Exam.     Future Office Visit:      Sig: TAKE 1 TABLET(25 MG) BY MOUTH DAILY    Diuretics (Including Combos) Protocol Passed    12/7/2018  5:16 AM       Passed - Blood pressure under 140/90 in past 12 months    BP Readings from Last 3 Encounters:   09/17/18 134/82   09/10/18 134/74   08/13/18 130/72                Passed - Recent (12 mo) or future (30 days) visit within the authorizing provider's specialty    Patient had office visit in the last 12 months or has a visit in the next 30 days with authorizing provider or within the authorizing provider's specialty.  See \"Patient Info\" tab in inbasket, or \"Choose Columns\" in Meds & Orders section of the refill encounter.             Passed - Patient is age 18 or older       Passed - No active pregancy on record       Passed - Normal serum creatinine on file in past 12 months    Recent Labs   Lab Test  06/18/18   1600   CR  0.87             Passed - Normal serum potassium on file in past 12 months    Recent Labs   Lab Test  06/18/18   1600   POTASSIUM  5.0                   Passed - Normal serum sodium on file in past 12 months    Recent Labs   Lab Test  06/18/18   1600   NA  137             Passed - No positive pregnancy test in past 12 months          "

## 2018-12-07 NOTE — TELEPHONE ENCOUNTER
Routing refill request to provider for review/approval because:  A break in medication    Last script was on 3/19/18.     Kanika Chang RN -- [facility]

## 2018-12-12 ENCOUNTER — MYC MEDICAL ADVICE (OUTPATIENT)
Dept: PHARMACY | Facility: CLINIC | Age: 28
End: 2018-12-12

## 2018-12-12 NOTE — TELEPHONE ENCOUNTER
"Pt returned call  States she thought the hydrochlorothiazide was making her feel weird so stopped taking it  Thought fluid retention had cleared  States she plans to take daily going forward, \"it was something else that was making me feel weird\"    Route to provider to review and advise    Flor Resendiz RN Nurse Triage        "

## 2018-12-12 NOTE — TELEPHONE ENCOUNTER
Reconcile report shows rx filled #30 on 3.19.18 and again 9.7.18  Call out to pharmacy and verified those were the only 2 rx's filled  Saw Kirby BANSAL who reported she was taking rx daily    LMTCB    Flor Resendiz RN, BS  Clinical Nurse Triage.

## 2018-12-12 NOTE — TELEPHONE ENCOUNTER
12-12-18      I guess I wasn't aware of that. : Thanks! I had been decreasing the amount I used thinking it was just because my water retention and finger puffiness was going away. I should have talked to you, but I was under the impression from my primary doctor that I could cut back if it seemed to be working. I was just a little confused on which to listen to if that makes any sense.    mtm will review with her at next OV.    Kirby Peralta Rph.  Medication Therapy Management Provider  [phone]

## 2018-12-12 NOTE — TELEPHONE ENCOUNTER
12-12-18      I would like to know more about the hydrochlorothiozide. I was under the impression it was just used for water retention and that is why i was on it. I guess it is used to help blood pressure as well. Is that correct? When I was initially placed on it, by my doctor, i was told that was what it was used for.      mtm will educate patient that diuretics are both water pills for edema and BP meds as well.    Kirby Peralta [facility].  Medication Therapy Management Provider  [phone]  '

## 2018-12-13 DIAGNOSIS — I10 ESSENTIAL HYPERTENSION: ICD-10-CM

## 2018-12-13 RX ORDER — SPIRONOLACTONE 25 MG/1
TABLET ORAL
Qty: 30 TABLET | Refills: 0 | Status: CANCELLED | OUTPATIENT
Start: 2018-12-13

## 2018-12-13 RX ORDER — AMLODIPINE BESYLATE 10 MG/1
TABLET ORAL
Qty: 90 TABLET | Refills: 0 | Status: CANCELLED | OUTPATIENT
Start: 2018-12-13

## 2018-12-18 DIAGNOSIS — G47.33 OBSTRUCTIVE SLEEP APNEA: Primary | ICD-10-CM

## 2019-01-03 ENCOUNTER — ALLIED HEALTH/NURSE VISIT (OUTPATIENT)
Dept: PHARMACY | Facility: CLINIC | Age: 29
End: 2019-01-03
Payer: COMMERCIAL

## 2019-01-03 VITALS
WEIGHT: 293 LBS | BODY MASS INDEX: 48.18 KG/M2 | HEART RATE: 93 BPM | SYSTOLIC BLOOD PRESSURE: 128 MMHG | DIASTOLIC BLOOD PRESSURE: 66 MMHG | OXYGEN SATURATION: 97 %

## 2019-01-03 DIAGNOSIS — I10 ESSENTIAL HYPERTENSION: Primary | ICD-10-CM

## 2019-01-03 DIAGNOSIS — Z30.9 ENCOUNTER FOR CONTRACEPTIVE MANAGEMENT, UNSPECIFIED TYPE: ICD-10-CM

## 2019-01-03 PROCEDURE — 99607 MTMS BY PHARM ADDL 15 MIN: CPT | Performed by: PHARMACIST

## 2019-01-03 PROCEDURE — 99605 MTMS BY PHARM NP 15 MIN: CPT | Performed by: PHARMACIST

## 2019-01-03 NOTE — PATIENT INSTRUCTIONS
Recommendations from today's MTM visit:                                                        1. FYI--Weight is 365.2lbs today --you've lost 18 lbs. Since March -2018.   Congrats-- keep up great work on diet and exercise you are doing.         Next MTM visit:   6 months -- Thursday July 11th -2019 at 3;30pm.     To schedule another MTM appointment, please call the clinic directly or you may call the MTM scheduling line at [phone] or toll-free at [phone].     My Clinical Pharmacist's contact information:                                                      It was a pleasure talking with you today!  Please feel free to contact me with any questions or concerns you have.      Kirby Peralta Rph.  Medication Therapy Management Provider  [phone]      You may receive a survey about the MTM services you received.  I would appreciate your feedback to help me serve you better in the future. Please fill it out and return it when you can. Your comments will be anonymous.

## 2019-01-03 NOTE — PROGRESS NOTES
"SUBJECTIVE/OBJECTIVE:                           Zenobia Cantu is a 28 year old female coming in for an f/up (9-17-18) visit for Medication Therapy Management.  She was referred to me from Joe Ramirez/BP gap program at [facility] .     Chief Complaint:  Here for med review --feels great!   .  Personal Healthcare Goals:  Fix BP, \"get rid of all medications\"    Allergies/ADRs: Reviewed in Epic  Tobacco: No tobacco use  Alcohol: not currently using  Caffeine: now drinks about a couple of sodas each week (previously 20ounce sodas/day)  Activity:  30 minutes /day on treadmill /elliptical 5 days /week -gym .  PMH: Reviewed in Epic; family hx --father has hi BP.      Medication Adherence/Access:  no issues reported    Hypertension: Current medications include: 12.5mg spironolactone every morning,  amlodipine 10mg. Hs., hctz 25mg. Qam,  olmesartan 20mg bid .  Patient does self-monitor BP. Pt. Self reports home BP's: 120/73 - 128/82 P = 70-80s    BP Readings from Last 3 Encounters:   01/03/19 128/66   09/17/18 134/82   09/10/18 134/74         Weight loss/ prediabetes: Current therapy is metformin-2x 500 mgER tabs . BID and Off ozempic --made her too nauseous. Feels fine gi wise now.      Exercise - not as much last 2 weeks thru holidays--  Goes to gym 5 days /week - 30 minutes/day-when not feeling ill.     Lab Results   Component Value Date    A1C 5.9 06/18/2018    A1C 5.7 08/12/2017         Contraception/ Family Planning: Current therapy: Just IUD now --off ocp. She is hopeful the IUD will work out as she is frustrated with inconsistent periods and feels that she cannot trust her current birth control pill, which is also frustrating to her .     Declines all flu shots.    BP Readings from Last 1 Encounters:   01/03/19 128/66     Pulse Readings from Last 1 Encounters:   01/03/19 93     Wt Readings from Last 1 Encounters:   01/03/19 (!) 365 lb 3.2 oz (165.7 kg)     Ht Readings from Last 1 Encounters:   09/10/18 6' " "1\" (1.854 m)     Estimated body mass index is 48.18 kg/m  as calculated from the following:    Height as of 9/10/18: 6' 1\" (1.854 m).    Weight as of this encounter: 365 lb 3.2 oz (165.7 kg).    Temp Readings from Last 1 Encounters:   09/10/18 98.2  F (36.8  C) (Oral)         ASSESSMENT:                             Current medications were reviewed today.     Medication Adherence: excellent, no issues identified    Hypertension: Stable. Patient is meeting BP goal of < 140/90mmHg.   Pt would benefit from the following changes - continued BP monitoring, watching diet, increasing exercise and weight loss and sodium restriction    Weight loss/ prediabetes: Improvin lbs. Weight loss since Ozempic start --she is doing great on diet and exercise plan , cannot tolerate more than 0.5mg/week Ozempic so she will stay on that dose.    Contraception/ Family Planning: Stable on IUD now.         PLAN:                            1. FYI--Weight is 365.2lbs today --you've lost 18 lbs. Since March -.   Congrats-- keep up great work on diet and exercise you are doing.         Next [facility] visit:   6 months --  at 3;30pm.     I spent 30 minutes with this patient today. All changes were made via collaborative practice agreement with Joe Ramirez. A copy of the visit note was provided to the patient's primary care provider.    The patient was given a summary of these recommendations as an after visit summary.     Kirby Peralta Rp.  Medication Therapy Management Provider  [phone]  "

## 2019-02-05 ENCOUNTER — MYC MEDICAL ADVICE (OUTPATIENT)
Dept: PHARMACY | Facility: CLINIC | Age: 29
End: 2019-02-05

## 2019-02-05 DIAGNOSIS — I10 ESSENTIAL HYPERTENSION: ICD-10-CM

## 2019-02-05 RX ORDER — OLMESARTAN MEDOXOMIL 20 MG/1
20 TABLET ORAL
Qty: 180 TABLET | Refills: 1 | Status: SHIPPED | OUTPATIENT
Start: 2019-02-05 | End: 2019-05-28

## 2019-02-05 NOTE — TELEPHONE ENCOUNTER
2-5-19      Hi,   I submitted an request for refill of olmesartren with my primary doctor last week, but its delayed waiting for doctor approval. You are usually much faster than the doctor in [place]. Wondering if you are able to approve the refill.     Thanks!      mtm will refill -today .

## 2019-03-13 ENCOUNTER — MYC MEDICAL ADVICE (OUTPATIENT)
Dept: PHARMACY | Facility: CLINIC | Age: 29
End: 2019-03-13

## 2019-03-13 DIAGNOSIS — F33.0 MAJOR DEPRESSIVE DISORDER, RECURRENT EPISODE, MILD (H): ICD-10-CM

## 2019-03-13 RX ORDER — FLUOXETINE 40 MG/1
CAPSULE ORAL
Qty: 90 CAPSULE | Refills: 1 | Status: SHIPPED | OUTPATIENT
Start: 2019-03-13 | End: 2019-08-13

## 2019-03-13 RX ORDER — FLUOXETINE 40 MG/1
CAPSULE ORAL
Qty: 90 CAPSULE | Refills: 1 | Status: CANCELLED | OUTPATIENT
Start: 2019-03-13

## 2019-03-13 NOTE — TELEPHONE ENCOUNTER
3-13-19      I am not sure how long it will take for the [facility] to approve the Flouxitine refill request. Wondering if you are able to approve the refill request.     Everything else is going great!      mtm will refill for her today .    Kirby Peralta [facility].  Medication Therapy Management Provider  [phone]

## 2019-03-13 NOTE — TELEPHONE ENCOUNTER
"Requested Prescriptions   Pending Prescriptions Disp Refills     FLUoxetine (PROZAC) 40 MG capsule    Last Written Prescription Date:  9/17/18  Last Fill Quantity: 90 capsules,  # refills: 1   Last office visit: 9/10/2018 with prescribing provider:  Bhargav   Future Office Visit:     90 capsule 1     Sig: TK 1 C PO D    SSRIs Protocol Failed - 3/13/2019  9:07 AM       Failed - PHQ-9 score less than 5 in past 6 months    Please review last PHQ-9 score.       PHQ-9 SCORE 7/31/2017 3/19/2018 9/10/2018   PHQ-9 Total Score MyChart - - 1 (Minimal depression)   PHQ-9 Total Score 3 0 1     RUBEN-7 SCORE 7/25/2017 7/31/2017 9/10/2018   Total Score - - 7 (mild anxiety)   Total Score 3 11 7            Failed - Recent (6 mo) or future (30 days) visit within the authorizing provider's specialty    Patient had office visit in the last 6 months or has a visit in the next 30 days with authorizing provider or within the authorizing provider's specialty.  See \"Patient Info\" tab in inbasket, or \"Choose Columns\" in Meds & Orders section of the refill encounter.           Passed - Medication is active on med list       Passed - Patient is age 18 or older       Passed - No active pregnancy on record       Passed - No positive pregnancy test in last 12 months          "

## 2019-04-17 DIAGNOSIS — R73.01 IFG (IMPAIRED FASTING GLUCOSE): ICD-10-CM

## 2019-04-18 ENCOUNTER — MYC MEDICAL ADVICE (OUTPATIENT)
Dept: PHARMACY | Facility: CLINIC | Age: 29
End: 2019-04-18

## 2019-04-18 ENCOUNTER — OFFICE VISIT (OUTPATIENT)
Dept: ENDOCRINOLOGY | Facility: CLINIC | Age: 29
End: 2019-04-18
Payer: COMMERCIAL

## 2019-04-18 ENCOUNTER — MYC REFILL (OUTPATIENT)
Dept: ENDOCRINOLOGY | Facility: CLINIC | Age: 29
End: 2019-04-18

## 2019-04-18 VITALS
BODY MASS INDEX: 47.75 KG/M2 | TEMPERATURE: 98.5 F | RESPIRATION RATE: 12 BRPM | SYSTOLIC BLOOD PRESSURE: 120 MMHG | WEIGHT: 293 LBS | DIASTOLIC BLOOD PRESSURE: 70 MMHG | HEART RATE: 94 BPM

## 2019-04-18 DIAGNOSIS — R73.01 IFG (IMPAIRED FASTING GLUCOSE): ICD-10-CM

## 2019-04-18 DIAGNOSIS — I10 ESSENTIAL HYPERTENSION: ICD-10-CM

## 2019-04-18 DIAGNOSIS — E66.01 MORBID OBESITY (H): ICD-10-CM

## 2019-04-18 DIAGNOSIS — R73.03 PREDIABETES: Primary | ICD-10-CM

## 2019-04-18 LAB — HBA1C MFR BLD: 5.6 % (ref 0–5.6)

## 2019-04-18 PROCEDURE — 99214 OFFICE O/P EST MOD 30 MIN: CPT | Performed by: CLINICAL NURSE SPECIALIST

## 2019-04-18 PROCEDURE — 83036 HEMOGLOBIN GLYCOSYLATED A1C: CPT | Performed by: CLINICAL NURSE SPECIALIST

## 2019-04-18 PROCEDURE — 80053 COMPREHEN METABOLIC PANEL: CPT | Performed by: CLINICAL NURSE SPECIALIST

## 2019-04-18 PROCEDURE — 36415 COLL VENOUS BLD VENIPUNCTURE: CPT | Performed by: CLINICAL NURSE SPECIALIST

## 2019-04-18 RX ORDER — PHENTERMINE HYDROCHLORIDE 37.5 MG/1
37.5 TABLET ORAL
Qty: 32 TABLET | Refills: 2 | Status: SHIPPED | OUTPATIENT
Start: 2019-04-18 | End: 2019-04-24

## 2019-04-18 RX ORDER — METFORMIN HCL 500 MG
TABLET, EXTENDED RELEASE 24 HR ORAL
Qty: 360 TABLET | Refills: 3 | Status: SHIPPED | OUTPATIENT
Start: 2019-04-18 | End: 2019-05-14

## 2019-04-18 RX ORDER — METFORMIN HCL 500 MG
TABLET, EXTENDED RELEASE 24 HR ORAL
Qty: 360 TABLET | Refills: 3 | COMMUNITY
Start: 2018-03-26 | End: 2019-04-18

## 2019-04-18 RX ORDER — METFORMIN HCL 500 MG
TABLET, EXTENDED RELEASE 24 HR ORAL
Qty: 360 TABLET | Refills: 1 | Status: SHIPPED | OUTPATIENT
Start: 2019-04-18 | End: 2019-04-18

## 2019-04-18 NOTE — TELEPHONE ENCOUNTER
Has not seen Darya for over a year.  No upcoming appt.  L/M to call.  Mar Roberts RN    Associated Diagnoses     IFG (impaired fasting glucose) [R73.01]           Biguanide Agents Failed4/18 7:49 AM   Patient has documented LDL within the past 12 mos.    Patient has had a Microalbumin in the past 15 mos.    Patient has documented A1c within the specified period of time.    Recent (6 mo) or future (30 days) visit within the authorizing provider's specialty     3/26/18  A/P  Ms.Amanda Cantu is a 28 year old here for the management of obestiy:     1. Morbid Obesity-  BMI > 50 with comorbidities - HTN, prediabetes, KENY.     Obesity is associated with a significant increase in mortality and risk of many disorders, including diabetes mellitus, hypertension, dyslipidemia, heart disease, stroke, sleep apnea, cancer, and many others. Conversely, weight loss is associated with a reduction in obesity-associated morbidity.    Endocrine evaluation of obesity includes Diabetes/prediabetes, Cushing's and thyroid dysfunction.  The relevant work up for the diagnosis and management of obesity and various treatment options were discussed. Body Mass Index (BMI) has been a standard means for calculating risk for overweight and obesity. The new American Association of Clinical Endocrinology (AACE) algorithm recommends lifestyle modifications as the initial phase of treatment for all patients with the BMI equal or greater than 25 kg/m2. Lifestyle modifications includes use of medical nutrition therapy, exercise, tobacco cessation, adequate quality and quantity of sleep, limited consumption of alcohol and reduced stress through implementation of a structured, multidisciplinary program.     In patients with complications associated with obesity, graded interventions are recommended including pharmacological therapy such as phentermine, orlistat, lorcaserin and phentermine/topiramate ER, contrave ( buproprion/naltreone) and the use of  very low calorie meal replacement programs.     If medical intervention is insufficient, surgical therapy may be considered, especially in patients with BMI greater than or equal to 35 kg/m2 with multiple complications. Surgical treatments include lap-band, gastric sleeve or gastric bypass surgery.     I informed the pt that:  1.Weight loss medications can be taken to assist with weight reduction when combined with appropriate healthy lifestyle changes.  2.I discussed possible s/e, risks and benefits of weight loss medications.  3.All medications are FDA approved, however, some may be used ''off label'' for their weight loss benefits and some ''short term'' medications can be used for longer periods to achieve desired clinical outcomes.  4.All patients taking weight loss medications must be seen in clinic for refill authorization.     Counseling exercise ( V65.41)  Dietary counseling( V65.3) - 7718-2996 calories per day.  Using online program called [employer] (additional fee for online coaching)  Calculated BMI above the upper parameter and f/u plan was documented in the medical record.  Discussed indications, risks and benefits of all medications prescribed, and answered questions to patient's satisfaction.  Contrave not covered  Started Wellbutrin 150 mg bid and Naltrexone 25 mg bid 11/2017.  Has lost 4 lbs since last seen  Continue Wellbutrin 150 mg bid and Naltrexone 25 mg bid.           3.  Prediabetes/IFG.  Currently treated with Metformin 500 mg bid.  Tolerating Metformin well.  Recent glucose 131 (most likely nonfasting).  Increase Metformin to 1500 mg/day, then 2000 mg/day as tolerated.     History of binge eating disorder. States this is not currently controlled.  Previously in the [employer] program but the program hours did not work out with her work schedule.  Recommend she look into the [employer] program - depending on her insurance coverage.  I think it is preferable she work with an eating disorder program but  alternately could start by seeing a counselor.     Labs ordered today:   No orders of the defined types were placed in this encounter.     Radiology/Consults ordered today: None     More than 50% of the time spent with Ms. Cantu on counseling / coordinating her care.  Total face to face time was 25 minutes.        Follow-up:  3 months.     Darya Fuentes NP  Endocrinology  [facility]  CC:   ____________________________________________________________         Other Notes      All notes   Instructions         Return in about 3 months (around 6/26/2018).

## 2019-04-18 NOTE — TELEPHONE ENCOUNTER
Requested Prescriptions   Pending Prescriptions Disp Refills     metFORMIN (GLUCOPHAGE-XR) 500 MG 24 hr tablet [Pharmacy Med Name: METFORMIN ER 500MG 24HR TABS] 360 tablet 0     Sig: TAKE 2 TABLETS BY MOUTH TWICE DAILY WITH MEALS   Last Written Prescription Date:  3/26/18  Last Fill Quantity: 360,  # refills: 3   Last Office Visit: 3/26/2018 Bhargav      Return in about 1 year (around 9/10/2019) for Physical Exam.     Future Office Visit:    Next 5 appointments (look out 90 days)    Apr 18, 2019  5:00 PM CDT  Return Visit with LENORA Javier [facility] ([facility] [address] [facility] [address]  [phone]   Jul 11, 2019  3:30 PM CDT  SHORT with Kirby Peralta RPH  [facility] ([facility]) [address] [facility] [address]  [phone]             Biguanide Agents Failed - 4/18/2019  3:33 PM        Failed - Patient has documented LDL within the past 12 mos.     Recent Labs   Lab Test 08/12/17  0801   LDL 65             Failed - Patient has had a Microalbumin in the past 15 mos.     No lab results found.          Failed - Patient has documented A1c within the specified period of time.     If HgbA1C is 8 or greater, it needs to be on file within the past 3 months.  If less than 8, must be on file within the past 6 months.     Recent Labs   Lab Test 06/18/18  1600   A1C 5.9*             Passed - Blood pressure less than 140/90 in past 6 months     BP Readings from Last 3 Encounters:   01/03/19 128/66   09/17/18 134/82   09/10/18 134/74                 Passed - Patient is age 10 or older        Passed - Patient's CR is NOT>1.4 OR Patient's EGFR is NOT<45 within past 12 mos.     Recent Labs   Lab Test 06/18/18  1600   GFRESTIMATED 78   GFRESTBLACK >90       Recent Labs   Lab Test 06/18/18  1600   CR 0.87             Passed - Patient does NOT have a diagnosis of CHF.        Passed - Medication is  "active on med list        Passed - Patient is not pregnant        Passed - Patient has not had a positive pregnancy test within the past 12 mos.         Passed - Recent (6 mo) or future (30 days) visit within the authorizing provider's specialty     Patient had office visit in the last 6 months or has a visit in the next 30 days with authorizing provider or within the authorizing provider's specialty.  See \"Patient Info\" tab in inbasket, or \"Choose Columns\" in Meds & Orders section of the refill encounter.            "

## 2019-04-18 NOTE — TELEPHONE ENCOUNTER
Patient called back.  Discussed has not seen Darya for over a year.  States she thought Kirby took over.  Discussed does have to see Darya still at least yearly to continue RX.  Transferred to appts to schedule.  Appears she got appt today at 5 pm.  Can address refill at visit.  Mar Roberts RN

## 2019-04-18 NOTE — TELEPHONE ENCOUNTER
"Routing refill request to provider for review/approval because:  Labs out of range:    Labs not current:    Urmila ALVARADO RN - Triage  [facility]              Requested Prescriptions   Pending Prescriptions Disp Refills     metFORMIN (GLUCOPHAGE-XR) 500 MG 24 hr tablet 360 tablet 3     Sig: TAKE 2 TABLET(500 MG) BY MOUTH TWICE DAILY WITH MEALS       Biguanide Agents Failed - 4/18/2019  1:47 PM        Failed - Patient has documented LDL within the past 12 mos.     Recent Labs   Lab Test 08/12/17  0801   LDL 65             Failed - Patient has had a Microalbumin in the past 15 mos.     No lab results found.          Failed - Patient has documented A1c within the specified period of time.     If HgbA1C is 8 or greater, it needs to be on file within the past 3 months.  If less than 8, must be on file within the past 6 months.     Recent Labs   Lab Test 06/18/18  1600   A1C 5.9*             Failed - Recent (6 mo) or future (30 days) visit within the authorizing provider's specialty     Patient had office visit in the last 6 months or has a visit in the next 30 days with authorizing provider or within the authorizing provider's specialty.  See \"Patient Info\" tab in inbasket, or \"Choose Columns\" in Meds & Orders section of the refill encounter.            Passed - Blood pressure less than 140/90 in past 6 months     BP Readings from Last 3 Encounters:   01/03/19 128/66   09/17/18 134/82   09/10/18 134/74                 Passed - Patient is age 10 or older        Passed - Patient's CR is NOT>1.4 OR Patient's EGFR is NOT<45 within past 12 mos.     Recent Labs   Lab Test 06/18/18  1600   GFRESTIMATED 78   GFRESTBLACK >90       Recent Labs   Lab Test 06/18/18  1600   CR 0.87             Passed - Patient does NOT have a diagnosis of CHF.        Passed - Medication is active on med list        Passed - Patient is not pregnant        Passed - Patient has not had a positive pregnancy test within the past 12 mos.           "

## 2019-04-18 NOTE — LETTER
4/18/2019         RE: Zenobia Cantu  [address] [address]        Dear Colleague,    Thank you for referring your patient, eZnobia Cantu, to the [facility]. Please see a copy of my visit note below.    Name: Zenobia Cantu  F/u for obesity (Last seen 3/26/2018).  HPI:  Zenobia Cantu is a 29 year old female who presents for the managment of obestiy  Has tried many different things for weight loss - over the counter weight loss supplements, weight watchers online, food diary/calorie tracking.  History of eating disorder. Binge eating disorder.  Currently not in an eating disorder program.      Diabetes:Prediabetes, currently treated with Metformin 1000 mg bid.  Trial of Ozempic caused GI side effects.  Sleep Apnea.  On Cpap  Hypertension or CAD: No CAD, + HTN, currently treated with Amlodipine 10 mg every day, HCTZ 25 mg every day, Benicar 20 mg bid, and spironolactone 12.5 mg daily.    Vital Signs 9/10/2018 9/17/2018 1/3/2019   Systolic 134 134 128   Diastolic 74 82 66     Diet: portion control  Exercise:Yes: 5 days per week, cardio and weights, [employer], working out up to 1.5 hours per day.  PMH/PSH:  Past Medical History:   Diagnosis Date     Depressive disorder 2010     Essential hypertension 8/28/2017     Past Surgical History:   Procedure Laterality Date     INSERT INTRAUTERINE DEVICE  09/10/2018    mirena - remove 9/10/2023     Family Hx:  Family History   Problem Relation Age of Onset     Hypertension Father         He lives a healthy lifestyle     Depression Father      Hypertension Paternal Grandmother      Hypertension Other         Believe my grandmother's mom had problems too     Thyroid disease: No         DM2: Yes: MGM         Autoimmune: DM1, SLE, RA, Vitiligo No    Social Hx:  Social History     Socioeconomic History     Marital status:      Spouse name: Ish     Number of children: 0     Years of education: Not on file     Highest education level:  Not on file   Occupational History     Occupation: operations     Comment: school for medical coding   Social Needs     Financial resource strain: Not on file     Food insecurity:     Worry: Not on file     Inability: Not on file     Transportation needs:     Medical: Not on file     Non-medical: Not on file   Tobacco Use     Smoking status: Never Smoker     Smokeless tobacco: Never Used   Substance and Sexual Activity     Alcohol use: Yes     Comment: rarely     Drug use: No     Sexual activity: Yes     Partners: Male     Birth control/protection: Pill   Lifestyle     Physical activity:     Days per week: Not on file     Minutes per session: Not on file     Stress: Not on file   Relationships     Social connections:     Talks on phone: Not on file     Gets together: Not on file     Attends Restorationism service: Not on file     Active member of club or organization: Not on file     Attends meetings of clubs or organizations: Not on file     Relationship status: Not on file     Intimate partner violence:     Fear of current or ex partner: Not on file     Emotionally abused: Not on file     Physically abused: Not on file     Forced sexual activity: Not on file   Other Topics Concern     Parent/sibling w/ CABG, MI or angioplasty before 65F 55M? No   Social History Narrative     Not on file          MEDICATIONS:  has a current medication list which includes the following prescription(s): amlodipine, vitamin d, fluoxetine, fluticasone, hydrochlorothiazide, metformin, olmesartan, spironolactone, levonorgestrel, metformin, and metformin.    ROS   ROS: 10 point ROS neg other than the symptoms noted above in the HPI.    Physical Exam   VS: /70 (BP Location: Left arm, Patient Position: Chair, Cuff Size: Adult Large)   Pulse 94   Temp 98.5  F (36.9  C) (Oral)   Resp 12   Wt (!) 164.2 kg (361 lb 14.4 oz)   Breastfeeding? No   BMI 47.75 kg/m     GENERAL: AXOX3, NAD, well dressed, answering questions appropriately,  "appears stated age.  HEENT: no exopthalmous, no proptosis, no lig lag, no retraction  CV: RRR  LUNGS: CTAB  EXTREMITIES: no edema  NEUROLOGY: CN grossly intact, no tremors  MSK: grossly intac    LABS:  !COMPREHENSIVE Latest Ref Rng & Units 6/18/2018   SODIUM 133 - 144 mmol/L 137   POTASSIUM 3.4 - 5.3 mmol/L 5.0   CHLORIDE 94 - 109 mmol/L 104   BUN 7 - 30 mg/dL 17   Creatinine 0.52 - 1.04 mg/dL 0.87   Glucose 70 - 99 mg/dL 99   ANION GAP 3 - 14 mmol/L 8   CALCIUM 8.5 - 10.1 mg/dL 8.9     Component      Latest Ref Rng & Units 8/12/2017 6/18/2018   Hemoglobin A1C      0 - 5.6 % 5.7 5.9 (H)     Component    Latest Ref Rng & Units 8/12/2017   Glucose    70 - 99 mg/dL 103 (H)   Insulin   3 - 25 mU/L 21.7   C-Peptide    0.9 - 6.9 ng/mL 3.3   Hemoglobin A1C    4.3 - 6.0 % 5.7     !LIPID/HEPATIC Latest Ref Rng & Units 8/12/2017   CHOLESTEROL <200 mg/dL 128   TRIGLYCERIDES <150 mg/dL 105   HDL CHOLESTEROL >49 mg/dL 42 (L)   LDL CHOLESTEROL, CALCULATED <100 mg/dL 65   NON HDL CHOLESTEROL <130 mg/dL 86     !THYROID Latest Ref Rng & Units 7/25/2017   TSH 0.40 - 4.00 mU/L 1.90     Component    Latest Ref Rng & Units 7/25/2017   WBC    4.0 - 11.0 10e9/L 5.8   RBC Count    3.8 - 5.2 10e12/L 4.37   Hemoglobin    11.7 - 15.7 g/dL 13.3   Hematocrit    35.0 - 47.0 % 40.3     Vital Signs 4/18/2019   Weight (LB) 361 lb 14.4 oz   Height    BMI (Calculated) 47.75     Waist Circumference: 63\" (8/2/2017).  62\" (11/6/2017).  62\" (3/2018).  61.75\" (today)    All pertinent notes, labs, and images personally reviewed by me.     A/P  Ms.Amanda Cantu is a 29 year old here for the management of obestiy:    1. Morbid Obesity-  BMI 47-48 with comorbidities - HTN, prediabetes, KENY.    Obesity is associated with a significant increase in mortality and risk of many disorders, including diabetes mellitus, hypertension, dyslipidemia, heart disease, stroke, sleep apnea, cancer, and many others. Conversely, weight loss is associated with a reduction in " obesity-associated morbidity.    Endocrine evaluation of obesity includes Diabetes/prediabetes, Cushing's and thyroid dysfunction.  The relevant work up for the diagnosis and management of obesity and various treatment options were discussed. Body Mass Index (BMI) has been a standard means for calculating risk for overweight and obesity. The new American Association of Clinical Endocrinology (AACE) algorithm recommends lifestyle modifications as the initial phase of treatment for all patients with the BMI equal or greater than 25 kg/m2. Lifestyle modifications includes use of medical nutrition therapy, exercise, tobacco cessation, adequate quality and quantity of sleep, limited consumption of alcohol and reduced stress through implementation of a structured, multidisciplinary program.    In patients with complications associated with obesity, graded interventions are recommended including pharmacological therapy such as phentermine, orlistat, lorcaserin and phentermine/topiramate ER, contrave ( buproprion/naltreone) and the use of very low calorie meal replacement programs.    If medical intervention is insufficient, surgical therapy may be considered, especially in patients with BMI greater than or equal to 35 kg/m2 with multiple complications. Surgical treatments include lap-band, gastric sleeve or gastric bypass surgery.    I informed the pt that:  1.Weight loss medications can be taken to assist with weight reduction when combined with appropriate healthy lifestyle changes.  2.I discussed possible s/e, risks and benefits of weight loss medications.  3.All medications are FDA approved, however, some may be used ''off label'' for their weight loss benefits and some ''short term'' medications can be used for longer periods to achieve desired clinical outcomes.  4.All patients taking weight loss medications must be seen in clinic for refill authorization.    Counseling exercise ( V65.41)  Dietary counseling( V65.3)  - 6383-2167 calories per day.  Using online program called NOOM (additional fee for online coaching)  Calculated BMI above the upper parameter and f/u plan was documented in the medical record.  Discussed indications, risks and benefits of all medications prescribed, and answered questions to patient's satisfaction.  Contrave not covered  Started Wellbutrin 150 mg bid and Naltrexone 25 mg bid 11/2017. Later discontinued due to ineffectiveness.  Has lost 21 lbs since 5/2018, 382 --> 361 lbs with diet and exercise efforts.  Start Phentermine 37.5 mg every day.  Continue to monitor BP - currently well controlled on antihypertensives.    3.  Prediabetes/IFG.  Currently treated with Metformin 1000 mg bid.  Tolerating Metformin well.       Labs ordered today:   Orders Placed This Encounter   Procedures     Comprehensive metabolic panel     Hemoglobin A1c     Radiology/Consults ordered today: None    More than 50% of the time spent with Ms. Cantu on counseling / coordinating her care.  Total face to face time was 25 minutes.      Follow-up:  3 months.    Darya Fuentes NP  Endocrinology  [facility]  CC:   ____________________________________________________________      Again, thank you for allowing me to participate in the care of your patient.        Sincerely,        LENORA Javier CNP

## 2019-04-18 NOTE — PATIENT INSTRUCTIONS
"Waist circ. 61.75\"    Start Phentermine.  Take 1/2 tab/day the first 5-7 days then increase to 1 tab per day.  I recommend taking the phentermine a little later in the morning - like around 10 am.    Check out the book \"The Obesity Code\"    I'll let you know today's lab results - liver/kidney/glucose and A1c.    Follow up in 3 months    Darya Fuentes NP  Endocrinology    "

## 2019-04-18 NOTE — TELEPHONE ENCOUNTER
4-18-19    Brynn Kirby,     My  just accepted a new position and we get our health insurance through him; so we will be without insurance for a bit. Is there a way to approve the refill request at [facility] for more than 30 days. Say 60? Just to get through the probation period he has at work and then get signed up for new insurance.     Thanks!      mtm will refill her metformin in 90 days supply today .    Kirby Peralta [facility].  Medication Therapy Management Provider  [phone]

## 2019-04-18 NOTE — PROGRESS NOTES
Name: Zenobia Cantu  F/u for obesity (Last seen 3/26/2018).  HPI:  Zenobia Cantu is a 29 year old female who presents for the managment of obestiy  Has tried many different things for weight loss - over the counter weight loss supplements, weight watchers online, food diary/calorie tracking.  History of eating disorder. Binge eating disorder.  Currently not in an eating disorder program.      Diabetes:Prediabetes, currently treated with Metformin 1000 mg bid.  Trial of Ozempic caused GI side effects.  Sleep Apnea.  On Cpap  Hypertension or CAD: No CAD, + HTN, currently treated with Amlodipine 10 mg every day, HCTZ 25 mg every day, Benicar 20 mg bid, and spironolactone 12.5 mg daily.    Vital Signs 9/10/2018 9/17/2018 1/3/2019   Systolic 134 134 128   Diastolic 74 82 66     Diet: portion control  Exercise:Yes: 5 days per week, cardio and weights, [employer], working out up to 1.5 hours per day.  PMH/PSH:  Past Medical History:   Diagnosis Date     Depressive disorder 2010     Essential hypertension 8/28/2017     Past Surgical History:   Procedure Laterality Date     INSERT INTRAUTERINE DEVICE  09/10/2018    mirena - remove 9/10/2023     Family Hx:  Family History   Problem Relation Age of Onset     Hypertension Father         He lives a healthy lifestyle     Depression Father      Hypertension Paternal Grandmother      Hypertension Other         Believe my grandmother's mom had problems too     Thyroid disease: No         DM2: Yes: MGM         Autoimmune: DM1, SLE, RA, Vitiligo No    Social Hx:  Social History     Socioeconomic History     Marital status:      Spouse name: Ish     Number of children: 0     Years of education: Not on file     Highest education level: Not on file   Occupational History     Occupation: operations     Comment: school for medical coding   Social Needs     Financial resource strain: Not on file     Food insecurity:     Worry: Not on file     Inability: Not on file      Transportation needs:     Medical: Not on file     Non-medical: Not on file   Tobacco Use     Smoking status: Never Smoker     Smokeless tobacco: Never Used   Substance and Sexual Activity     Alcohol use: Yes     Comment: rarely     Drug use: No     Sexual activity: Yes     Partners: Male     Birth control/protection: Pill   Lifestyle     Physical activity:     Days per week: Not on file     Minutes per session: Not on file     Stress: Not on file   Relationships     Social connections:     Talks on phone: Not on file     Gets together: Not on file     Attends Hindu service: Not on file     Active member of club or organization: Not on file     Attends meetings of clubs or organizations: Not on file     Relationship status: Not on file     Intimate partner violence:     Fear of current or ex partner: Not on file     Emotionally abused: Not on file     Physically abused: Not on file     Forced sexual activity: Not on file   Other Topics Concern     Parent/sibling w/ CABG, MI or angioplasty before 65F 55M? No   Social History Narrative     Not on file          MEDICATIONS:  has a current medication list which includes the following prescription(s): amlodipine, vitamin d, fluoxetine, fluticasone, hydrochlorothiazide, metformin, olmesartan, spironolactone, levonorgestrel, metformin, and metformin.    ROS   ROS: 10 point ROS neg other than the symptoms noted above in the HPI.    Physical Exam   VS: /70 (BP Location: Left arm, Patient Position: Chair, Cuff Size: Adult Large)   Pulse 94   Temp 98.5  F (36.9  C) (Oral)   Resp 12   Wt (!) 164.2 kg (361 lb 14.4 oz)   Breastfeeding? No   BMI 47.75 kg/m    GENERAL: AXOX3, NAD, well dressed, answering questions appropriately, appears stated age.  HEENT: no exopthalmous, no proptosis, no lig lag, no retraction  CV: RRR  LUNGS: CTAB  EXTREMITIES: no edema  NEUROLOGY: CN grossly intact, no tremors  MSK: grossly intac    LABS:  !COMPREHENSIVE Latest Ref Rng & Units  "6/18/2018   SODIUM 133 - 144 mmol/L 137   POTASSIUM 3.4 - 5.3 mmol/L 5.0   CHLORIDE 94 - 109 mmol/L 104   BUN 7 - 30 mg/dL 17   Creatinine 0.52 - 1.04 mg/dL 0.87   Glucose 70 - 99 mg/dL 99   ANION GAP 3 - 14 mmol/L 8   CALCIUM 8.5 - 10.1 mg/dL 8.9     Component      Latest Ref Rng & Units 8/12/2017 6/18/2018   Hemoglobin A1C      0 - 5.6 % 5.7 5.9 (H)     Component    Latest Ref Rng & Units 8/12/2017   Glucose    70 - 99 mg/dL 103 (H)   Insulin   3 - 25 mU/L 21.7   C-Peptide    0.9 - 6.9 ng/mL 3.3   Hemoglobin A1C    4.3 - 6.0 % 5.7     !LIPID/HEPATIC Latest Ref Rng & Units 8/12/2017   CHOLESTEROL <200 mg/dL 128   TRIGLYCERIDES <150 mg/dL 105   HDL CHOLESTEROL >49 mg/dL 42 (L)   LDL CHOLESTEROL, CALCULATED <100 mg/dL 65   NON HDL CHOLESTEROL <130 mg/dL 86     !THYROID Latest Ref Rng & Units 7/25/2017   TSH 0.40 - 4.00 mU/L 1.90     Component    Latest Ref Rng & Units 7/25/2017   WBC    4.0 - 11.0 10e9/L 5.8   RBC Count    3.8 - 5.2 10e12/L 4.37   Hemoglobin    11.7 - 15.7 g/dL 13.3   Hematocrit    35.0 - 47.0 % 40.3     Vital Signs 4/18/2019   Weight (LB) 361 lb 14.4 oz   Height    BMI (Calculated) 47.75     Waist Circumference: 63\" (8/2/2017).  62\" (11/6/2017).  62\" (3/2018).  61.75\" (today)    All pertinent notes, labs, and images personally reviewed by me.     A/P  Ms.Amanda Cantu is a 29 year old here for the management of obestiy:    1. Morbid Obesity-  BMI 47-48 with comorbidities - HTN, prediabetes, KENY.    Obesity is associated with a significant increase in mortality and risk of many disorders, including diabetes mellitus, hypertension, dyslipidemia, heart disease, stroke, sleep apnea, cancer, and many others. Conversely, weight loss is associated with a reduction in obesity-associated morbidity.    Endocrine evaluation of obesity includes Diabetes/prediabetes, Cushing's and thyroid dysfunction.  The relevant work up for the diagnosis and management of obesity and various treatment options were discussed. " Body Mass Index (BMI) has been a standard means for calculating risk for overweight and obesity. The new American Association of Clinical Endocrinology (AACE) algorithm recommends lifestyle modifications as the initial phase of treatment for all patients with the BMI equal or greater than 25 kg/m2. Lifestyle modifications includes use of medical nutrition therapy, exercise, tobacco cessation, adequate quality and quantity of sleep, limited consumption of alcohol and reduced stress through implementation of a structured, multidisciplinary program.    In patients with complications associated with obesity, graded interventions are recommended including pharmacological therapy such as phentermine, orlistat, lorcaserin and phentermine/topiramate ER, contrave ( buproprion/naltreone) and the use of very low calorie meal replacement programs.    If medical intervention is insufficient, surgical therapy may be considered, especially in patients with BMI greater than or equal to 35 kg/m2 with multiple complications. Surgical treatments include lap-band, gastric sleeve or gastric bypass surgery.    I informed the pt that:  1.Weight loss medications can be taken to assist with weight reduction when combined with appropriate healthy lifestyle changes.  2.I discussed possible s/e, risks and benefits of weight loss medications.  3.All medications are FDA approved, however, some may be used ''off label'' for their weight loss benefits and some ''short term'' medications can be used for longer periods to achieve desired clinical outcomes.  4.All patients taking weight loss medications must be seen in clinic for refill authorization.    Counseling exercise ( V65.41)  Dietary counseling( V65.3) - 1122-6617 calories per day.  Using online program called NOOM (additional fee for online coaching)  Calculated BMI above the upper parameter and f/u plan was documented in the medical record.  Discussed indications, risks and benefits of all  medications prescribed, and answered questions to patient's satisfaction.  Contrave not covered  Started Wellbutrin 150 mg bid and Naltrexone 25 mg bid 11/2017. Later discontinued due to ineffectiveness.  Has lost 21 lbs since 5/2018, 382 --> 361 lbs with diet and exercise efforts.  Start Phentermine 37.5 mg every day.  Continue to monitor BP - currently well controlled on antihypertensives.    3.  Prediabetes/IFG.  Currently treated with Metformin 1000 mg bid.  Tolerating Metformin well.       Labs ordered today:   Orders Placed This Encounter   Procedures     Comprehensive metabolic panel     Hemoglobin A1c     Radiology/Consults ordered today: None    More than 50% of the time spent with Ms. Cantu on counseling / coordinating her care.  Total face to face time was 25 minutes.      Follow-up:  3 months.    Darya Fuentes NP  Endocrinology  [facility]  CC:   ____________________________________________________________

## 2019-04-18 NOTE — TELEPHONE ENCOUNTER
"Requested Prescriptions   Pending Prescriptions Disp Refills     metFORMIN (GLUCOPHAGE-XR) 500 MG 24 hr tablet [Pharmacy Med Name: METFORMIN ER 500MG 24HR TABS] 360 tablet 0     Sig: TAKE 2 TABLETS BY MOUTH TWICE DAILY WITH MEALS    Last Written Prescription Date:  3/26/18  Last Fill Quantity: 360 tablet,  # refills: 3   Last office visit: 3/26/2018 with prescribing provider:   Alfredo    Future Office Visit:   Next 5 appointments (look out 90 days)    Jul 11, 2019  3:30 PM CDT  SHORT with Kirby Peralta [facility] ([facility] [address] [address]  [phone]                Biguanide Agents Failed - 4/17/2019  4:06 PM        Failed - Patient has documented LDL within the past 12 mos.     Recent Labs   Lab Test 08/12/17  0801   LDL 65             Failed - Patient has had a Microalbumin in the past 15 mos.     No lab results found.          Failed - Patient has documented A1c within the specified period of time.     If HgbA1C is 8 or greater, it needs to be on file within the past 3 months.  If less than 8, must be on file within the past 6 months.     Recent Labs   Lab Test 06/18/18  1600   A1C 5.9*             Failed - Recent (6 mo) or future (30 days) visit within the authorizing provider's specialty     Patient had office visit in the last 6 months or has a visit in the next 30 days with authorizing provider or within the authorizing provider's specialty.  See \"Patient Info\" tab in inbasket, or \"Choose Columns\" in Meds & Orders section of the refill encounter.            Passed - Blood pressure less than 140/90 in past 6 months     BP Readings from Last 3 Encounters:   01/03/19 128/66   09/17/18 134/82   09/10/18 134/74                 Passed - Patient is age 10 or older        Passed - Patient's CR is NOT>1.4 OR Patient's EGFR is NOT<45 within past 12 mos.     Recent Labs   Lab Test 06/18/18  1600   GFRESTIMATED 78   GFRESTBLACK >90 "       Recent Labs   Lab Test 06/18/18  1600   CR 0.87             Passed - Patient does NOT have a diagnosis of CHF.        Passed - Medication is active on med list        Passed - Patient is not pregnant        Passed - Patient has not had a positive pregnancy test within the past 12 mos.

## 2019-04-19 LAB
ALBUMIN SERPL-MCNC: 4 G/DL (ref 3.4–5)
ALP SERPL-CCNC: 69 U/L (ref 40–150)
ALT SERPL W P-5'-P-CCNC: 60 U/L (ref 0–50)
ANION GAP SERPL CALCULATED.3IONS-SCNC: 8 MMOL/L (ref 3–14)
AST SERPL W P-5'-P-CCNC: 25 U/L (ref 0–45)
BILIRUB SERPL-MCNC: 0.5 MG/DL (ref 0.2–1.3)
BUN SERPL-MCNC: 11 MG/DL (ref 7–30)
CALCIUM SERPL-MCNC: 9.4 MG/DL (ref 8.5–10.1)
CHLORIDE SERPL-SCNC: 103 MMOL/L (ref 94–109)
CO2 SERPL-SCNC: 25 MMOL/L (ref 20–32)
CREAT SERPL-MCNC: 0.84 MG/DL (ref 0.52–1.04)
GFR SERPL CREATININE-BSD FRML MDRD: >90 ML/MIN/{1.73_M2}
GLUCOSE SERPL-MCNC: 128 MG/DL (ref 70–99)
POTASSIUM SERPL-SCNC: 3.9 MMOL/L (ref 3.4–5.3)
PROT SERPL-MCNC: 7.9 G/DL (ref 6.8–8.8)
SODIUM SERPL-SCNC: 136 MMOL/L (ref 133–144)

## 2019-04-19 RX ORDER — METFORMIN HCL 500 MG
TABLET, EXTENDED RELEASE 24 HR ORAL
Status: SHIPPED
Start: 2019-04-19 | End: 2019-05-14

## 2019-04-22 ENCOUNTER — MYC MEDICAL ADVICE (OUTPATIENT)
Dept: ENDOCRINOLOGY | Facility: CLINIC | Age: 29
End: 2019-04-22

## 2019-04-22 RX ORDER — METFORMIN HCL 500 MG
TABLET, EXTENDED RELEASE 24 HR ORAL
Qty: 360 TABLET | Refills: 3 | Status: SHIPPED | OUTPATIENT
Start: 2019-04-22 | End: 2019-10-31

## 2019-04-22 NOTE — RESULT ENCOUNTER NOTE
Zenobia,  Your glucose was elevated but your A1c (3 month average glucose) has actually improved from 5.9% to 5.6% so this is reassuring.    Continue the metformin and your diet/exercise efforts.  Let me know if you have questions.  Darya Fuentes NP  Endocrinology

## 2019-04-22 NOTE — TELEPHONE ENCOUNTER
Darya- see mychart message below.  Please advise.  Mar Roberts RN    Viewed by Zenobia Cantu on 4/22/2019  6:44 AM   Written by Darya Fuentes APRN CNP on 4/22/2019  6:40 AM   Zenobia,   Your glucose was elevated but your A1c (3 month average glucose) has actually improved from 5.9% to 5.6% so this is reassuring.     Continue the metformin and your diet/exercise efforts.   Let me know if you have questions.   Darya Fuentes NP   Endocrinology

## 2019-04-22 NOTE — TELEPHONE ENCOUNTER
Tried to send this as a my chart message but not sure I succeeded.  You can relay by phone if it didn't get through.  Leo Fregoso,  For the elevated glucose, just keep doing what you are already doing - meal planning, weight loss, and exercise.  Avoiding refined, process carbohydrates and limiting your total carbohydrate intake is also helpful - eat no more than 3 carb servings per meal and limit snacks to one carb serving.  A carb serving is equal to 15 grams of carbhydrate if you are reading labels.  Darya Fuentes NP  Endocrinology

## 2019-04-22 NOTE — TELEPHONE ENCOUNTER
Leo Fregoso,  For the elevated glucose, just keep doing what you are already doing - meal planning, weight loss, and exercise.  Avoiding refined, process carbohydrates and limiting your total carbohydrate intake is also helpful - eat no more than 3 carb servings per meal and limit snacks to one carb serving.  A carb serving is equal to 15 grams of carbhydrate if you are reading labels.  Darya Fuentes NP  Endocrinology

## 2019-04-23 ENCOUNTER — MYC MEDICAL ADVICE (OUTPATIENT)
Dept: ENDOCRINOLOGY | Facility: CLINIC | Age: 29
End: 2019-04-23

## 2019-05-14 ENCOUNTER — OFFICE VISIT (OUTPATIENT)
Dept: FAMILY MEDICINE | Facility: CLINIC | Age: 29
End: 2019-05-14
Payer: COMMERCIAL

## 2019-05-14 VITALS
TEMPERATURE: 99.3 F | SYSTOLIC BLOOD PRESSURE: 136 MMHG | DIASTOLIC BLOOD PRESSURE: 60 MMHG | RESPIRATION RATE: 20 BRPM | BODY MASS INDEX: 47.25 KG/M2 | HEART RATE: 80 BPM | WEIGHT: 293 LBS | OXYGEN SATURATION: 97 %

## 2019-05-14 DIAGNOSIS — J00 ACUTE NASOPHARYNGITIS: Primary | ICD-10-CM

## 2019-05-14 PROCEDURE — 99213 OFFICE O/P EST LOW 20 MIN: CPT | Performed by: FAMILY MEDICINE

## 2019-05-14 RX ORDER — BENZONATATE 200 MG/1
200 CAPSULE ORAL 3 TIMES DAILY PRN
Qty: 30 CAPSULE | Refills: 1 | Status: SHIPPED | OUTPATIENT
Start: 2019-05-14 | End: 2019-06-13

## 2019-05-14 RX ORDER — AZITHROMYCIN 250 MG/1
TABLET, FILM COATED ORAL
Qty: 6 TABLET | Refills: 0 | Status: SHIPPED | OUTPATIENT
Start: 2019-05-14 | End: 2019-06-13

## 2019-05-14 ASSESSMENT — ENCOUNTER SYMPTOMS
SPUTUM PRODUCTION: 1
CHILLS: 1
DIARRHEA: 0
NAUSEA: 0
SORE THROAT: 1
CARDIOVASCULAR NEGATIVE: 1
VOMITING: 0
ABDOMINAL PAIN: 0
HEADACHES: 1
FEVER: 0
SINUS PAIN: 1
COUGH: 1
CONSTIPATION: 0

## 2019-05-14 ASSESSMENT — PATIENT HEALTH QUESTIONNAIRE - PHQ9: SUM OF ALL RESPONSES TO PHQ QUESTIONS 1-9: 2

## 2019-05-14 NOTE — PROGRESS NOTES
HPI    SUBJECTIVE:   Zenobia Cantu is a 29 year old female who presents to clinic today for the following   health issues:      Acute Illness   Acute illness concerns: URI  Onset: x1 week    Fever: no    Chills/Sweats: no    Headache (location?): YES    Sinus Pressure:YES    Conjunctivitis:  no    Ear Pain: YES: both    Rhinorrhea: YES    Congestion: YES    Sore Throat: YES- irritated from coughing     Cough: YES-productive of yellow sputum, productive of green sputum    Wheeze: no    Decreased Appetite: no    Nausea: no    Vomiting: no    Diarrhea:  no    Dysuria/Freq.: no    Fatigue/Achiness: YES    Sick/Strep Exposure: no     Therapies Tried and outcome: ibuprofen, Mucinex, Flonase    Cough started dry, more productive about three days ago.  No harleen fever.  Coughing tends to come in waves.  Feels things have been getting owrse in the last 2 days or so.    Meds reviewed.  No tobacco.    Review of Systems   Constitutional: Positive for chills and malaise/fatigue. Negative for fever.   HENT: Positive for congestion, ear pain, sinus pain and sore throat.    Respiratory: Positive for cough and sputum production.    Cardiovascular: Negative.    Gastrointestinal: Negative for abdominal pain, constipation, diarrhea, nausea and vomiting.   Skin: Negative for rash.   Neurological: Positive for headaches.         Physical Exam   Constitutional: No distress.   HENT:   Right Ear: Tympanic membrane, external ear and ear canal normal.   Left Ear: Tympanic membrane, external ear and ear canal normal.   Mouth/Throat: Oropharynx is clear and moist. No oropharyngeal exudate.   Eyes: Conjunctivae are normal.   Cardiovascular: Normal rate, regular rhythm and normal heart sounds.   Pulmonary/Chest: Effort normal and breath sounds normal.   Lymphadenopathy:     She has no cervical adenopathy.   Skin: Skin is warm and dry. No rash noted.   Nursing note and vitals reviewed.    (J00) Acute nasopharyngitis  (primary encounter  diagnosis)  Comment: will treat cough for now.  Printed abx for PRNin 4 days.  Plan: benzonatate (TESSALON) 200 MG capsule,         azithromycin (ZITHROMAX) 250 MG tablet              RTC in 2w    Royce Goldberg MD

## 2019-05-20 ENCOUNTER — MYC MEDICAL ADVICE (OUTPATIENT)
Dept: PHARMACY | Facility: CLINIC | Age: 29
End: 2019-05-20

## 2019-05-20 NOTE — TELEPHONE ENCOUNTER
5-20-19      Leo Orr,     I have kind of a random question. My  and I are trying to figure on which insurance plan to go with at his new job. Are the visits that I come to see you for considered preventative care? Or is there no charge for the visit? My current health insurance (which runs out at the end of the month)  doesn't seem to be charging me for visits and I don't see any claims through my insurance to pay anything out. I am just trying to figure out if this is just because of the insurance I currently have or if it is something that would happen with everything (including HSAs).     Thanks!      [facility] will have her send me insurance name options to check on [facility] coverage.    Kirby Peralta [facility].  Medication Therapy Management Provider  [phone]

## 2019-05-20 NOTE — TELEPHONE ENCOUNTER
5-20-19          Not sure if you need to know the plans, but his new work is going through [employer]. We are going back and forth between a few plans, some including HSA. I can get exact plans later if you need them. :)        mtm notes all [employer] covered unless [facility].    Kirby Peralta [facility].  Medication Therapy Management Provider  [phone]

## 2019-05-21 NOTE — TELEPHONE ENCOUNTER
5-21-19      You have been so help through all this...and why faster to get ahold of. Thank you for that. If you know this off hand, we are thinking of going with the [employer] choice passport plan [id number] if that falls under the plans that are covered. Wanted to make sure it didn't have the [facility] attached that is not covered.        mt feels yes her mtm visits will be covered.   Will check with [employer] --mtm coordinator- she feels she is most likely covered but not 100% sure is best we can tell now.    Kirby Peralta [facility].  Medication Therapy Management Provider  [phone]

## 2019-05-28 ENCOUNTER — MYC MEDICAL ADVICE (OUTPATIENT)
Dept: PHARMACY | Facility: CLINIC | Age: 29
End: 2019-05-28

## 2019-05-28 ENCOUNTER — MYC MEDICAL ADVICE (OUTPATIENT)
Dept: FAMILY MEDICINE | Facility: CLINIC | Age: 29
End: 2019-05-28

## 2019-05-28 DIAGNOSIS — I10 ESSENTIAL HYPERTENSION: ICD-10-CM

## 2019-05-28 RX ORDER — OLMESARTAN MEDOXOMIL 20 MG/1
20 TABLET ORAL
Qty: 180 TABLET | Refills: 1 | Status: SHIPPED | OUTPATIENT
Start: 2019-05-28 | End: 2019-08-13

## 2019-05-28 NOTE — TELEPHONE ENCOUNTER
Dr. Sanchez- see [facility] message below.  Are you still closed for new patients?  Please advise.  Mar Roberts RN

## 2019-05-28 NOTE — TELEPHONE ENCOUNTER
5-28-19    AMARIS Orr,     Are you able to approve a request for renewal of olmesatran?     Thanks.      mtm will approve her bp med refill today .

## 2019-05-29 ENCOUNTER — MYC MEDICAL ADVICE (OUTPATIENT)
Dept: FAMILY MEDICINE | Facility: CLINIC | Age: 29
End: 2019-05-29

## 2019-06-13 ENCOUNTER — OFFICE VISIT (OUTPATIENT)
Dept: FAMILY MEDICINE | Facility: CLINIC | Age: 29
End: 2019-06-13
Payer: COMMERCIAL

## 2019-06-13 VITALS
WEIGHT: 293 LBS | DIASTOLIC BLOOD PRESSURE: 62 MMHG | SYSTOLIC BLOOD PRESSURE: 104 MMHG | BODY MASS INDEX: 39.68 KG/M2 | RESPIRATION RATE: 14 BRPM | HEART RATE: 98 BPM | OXYGEN SATURATION: 96 % | HEIGHT: 72 IN | TEMPERATURE: 99.4 F

## 2019-06-13 DIAGNOSIS — F33.0 MAJOR DEPRESSIVE DISORDER, RECURRENT EPISODE, MILD (H): ICD-10-CM

## 2019-06-13 DIAGNOSIS — E66.01 MORBID OBESITY (H): ICD-10-CM

## 2019-06-13 DIAGNOSIS — F41.9 ANXIETY: ICD-10-CM

## 2019-06-13 DIAGNOSIS — K58.2 IRRITABLE BOWEL SYNDROME WITH BOTH CONSTIPATION AND DIARRHEA: Primary | ICD-10-CM

## 2019-06-13 LAB
BASOPHILS # BLD AUTO: 0 10E9/L (ref 0–0.2)
BASOPHILS NFR BLD AUTO: 0.5 %
DIFFERENTIAL METHOD BLD: NORMAL
EOSINOPHIL # BLD AUTO: 0.1 10E9/L (ref 0–0.7)
EOSINOPHIL NFR BLD AUTO: 1.5 %
ERYTHROCYTE [DISTWIDTH] IN BLOOD BY AUTOMATED COUNT: 11.7 % (ref 10–15)
HCT VFR BLD AUTO: 37.8 % (ref 35–47)
HGB BLD-MCNC: 12.9 G/DL (ref 11.7–15.7)
LYMPHOCYTES # BLD AUTO: 2.6 10E9/L (ref 0.8–5.3)
LYMPHOCYTES NFR BLD AUTO: 41.4 %
MCH RBC QN AUTO: 31 PG (ref 26.5–33)
MCHC RBC AUTO-ENTMCNC: 34.1 G/DL (ref 31.5–36.5)
MCV RBC AUTO: 91 FL (ref 78–100)
MONOCYTES # BLD AUTO: 0.4 10E9/L (ref 0–1.3)
MONOCYTES NFR BLD AUTO: 5.8 %
NEUTROPHILS # BLD AUTO: 3.2 10E9/L (ref 1.6–8.3)
NEUTROPHILS NFR BLD AUTO: 50.8 %
PLATELET # BLD AUTO: 361 10E9/L (ref 150–450)
RBC # BLD AUTO: 4.16 10E12/L (ref 3.8–5.2)
WBC # BLD AUTO: 6.2 10E9/L (ref 4–11)

## 2019-06-13 PROCEDURE — 36415 COLL VENOUS BLD VENIPUNCTURE: CPT | Performed by: FAMILY MEDICINE

## 2019-06-13 PROCEDURE — 84443 ASSAY THYROID STIM HORMONE: CPT | Performed by: FAMILY MEDICINE

## 2019-06-13 PROCEDURE — 99214 OFFICE O/P EST MOD 30 MIN: CPT | Performed by: FAMILY MEDICINE

## 2019-06-13 PROCEDURE — 85025 COMPLETE CBC W/AUTO DIFF WBC: CPT | Performed by: FAMILY MEDICINE

## 2019-06-13 RX ORDER — HYOSCYAMINE SULFATE 0.125 MG
0.12 TABLET ORAL EVERY 4 HOURS PRN
Qty: 60 TABLET | Refills: 3 | Status: SHIPPED | OUTPATIENT
Start: 2019-06-13

## 2019-06-13 ASSESSMENT — MIFFLIN-ST. JEOR: SCORE: 2445.47

## 2019-06-13 NOTE — PROGRESS NOTES
"Subjective     Zenobia Cantu is a 29 year old female who presents to clinic today for the following health issues:    HPI   Change in consistency of stool      Duration: X2 months    Description (location/character/radiation): lower abdominal discomfort    Intensity:  moderate    Accompanying signs and symptoms: Solid stool in the AM, follow by loose, grainy stools    History (similar episodes/previous evaluation): None    Precipitating or alleviating factors: None    Therapies tried and outcome: None     Change in stools over the past 2 months; symptoms on/off.    Has IBS symptoms--switches from constipation to diarrhea weekly, but now every couple days recently.  No fevers.  No nausea or vomiting.  No bloody stools.    Had URI about 2 months ago--cleared up with symptomatic support.      Has tried generic Pepto-Bismol---seems to work.       Losing weight intentionally over the past year--diet/exercise.     Has not noticed a pattern with food.    Reviewed and updated as needed this visit by Provider  Tobacco  Allergies  Meds  Problems  Med Hx  Surg Hx  Fam Hx         Review of Systems   ROS COMP: CONSTITUTIONAL: NEGATIVE for fever, chills.  +intentional weight loss--diet/exercise  INTEGUMENTARY/SKIN: NEGATIVE for worrisome rashes, moles or lesions  EYES: NEGATIVE for vision changes or irritation  ENT/MOUTH: NEGATIVE for ear, mouth and throat problems  RESP: NEGATIVE for significant cough or SOB  CV: NEGATIVE for chest pain, palpitations or peripheral edema  : NEGATIVE for frequency, dysuria, or hematuria  MUSCULOSKELETAL: NEGATIVE for significant arthralgias or myalgia  NEURO: NEGATIVE for weakness, dizziness or paresthesias  HEME: NEGATIVE for bleeding problems      Objective    /62 (Patient Position: Sitting, Cuff Size: Adult Large)   Pulse 98   Temp 99.4  F (37.4  C) (Tympanic)   Resp 14   Ht 1.854 m (6' 1\")   Wt (!) 158.8 kg (350 lb)   SpO2 96%   Breastfeeding? No   BMI 46.18 kg/m    Body " mass index is 46.18 kg/m .  Physical Exam   GENERAL: Alert and no acute distress  EYES: Eyes grossly normal to inspection, PERRL and conjunctivae and sclerae normal  HENT: ear canals and TM's normal, nose and mouth without ulcers or lesions  NECK: no adenopathy, no asymmetry, masses, or scars and thyroid normal to palpation  RESP: lungs clear to auscultation - no rales, rhonchi or wheezes  CV: regular rate and rhythm, normal S1 S2, no S3 or S4, no murmur, click or rub, and peripheral pulses strong  ABDOMEN: +pendulous abdomen so abdominal exam is limiting.  Soft, no hepatosplenomegaly that I can tell, no masses and bowel sounds normal.  +diffuse abdominal pain with moderate palpation.  No rebound tenderness or peritoneal signs.  MS: no gross musculoskeletal defects noted, no edema  SKIN: no suspicious lesions or rashes  NEURO: Normal strength and tone, mentation intact and speech normal  PSYCH: mentation appears normal and affect normal/bright    Diagnostic Test Results:  Labs reviewed in Epic    TSH/T4, CBC, stool studies    Assessment & Plan   Problem List Items Addressed This Visit     Major depressive disorder, recurrent episode, mild (H)    Anxiety    Morbid obesity (H)    Irritable bowel syndrome with both constipation and diarrhea - Primary    Relevant Medications    hyoscyamine (ANASPAZ/LEVSIN) 0.125 MG tablet    Other Relevant Orders    TSH with free T4 reflex (Completed)    CBC with platelets and differential (Completed)    Clostridium difficile Toxin B PCR    H Pylori antigen, stool    Enteric Bacteria and Virus Panel by [facility] Stool         IBS--agreeable to try Hyoscyamine.  Could be related to serotonin specific reuptake inhibitor, though has been on it for a while without any issues.  Mood--well controlled on Prozac.  Encouraged weight loss, diet/excersie.  Has lost weight due to healthy lifestyle changes which I congratulated.     Labs ordered and pending.    Discussed care/management of IBS along with  "diet/lifestyle changes; handout provided as well.  May refer to GI if needed.    BMI:   Estimated body mass index is 46.18 kg/m  as calculated from the following:    Height as of this encounter: 1.854 m (6' 1\").    Weight as of this encounter: 158.8 kg (350 lb).   Weight management plan: Discussed healthy diet and exercise guidelines        See Patient Instructions  Return in about 2 weeks (around 6/27/2019) for If Not Getting Any Better.    Cheryl Duval, DO  [facility]      "

## 2019-06-13 NOTE — PATIENT INSTRUCTIONS
Patient Education     Irritable Bowel Syndrome    Irritable bowel syndrome (IBS) is a disorder of the intestines. It is not a disease, but a group of symptoms caused by changes in the way the intestines work including how they move, secrete, absorb, and transit pain sensations. It is fairly common, but the cause is not well understood. There are other conditions that cause IBS symptoms, so make sure to speak with your provider to make sure that further evaluation isn't needed.  Symptoms of IBS include:    Belly pain, discomfort, and cramping    Diarrhea    Constipation or dry, hard stools    Mucous stool    Bloating    Feeling of incomplete bowel movements  It usually results in one of 3 patterns of symptoms:    Chronic belly pain and constipation    Recurring episodes of diarrhea, with belly pain or discomfort    Alternating diarrhea and constipation  Home care  The goal of treatment is to control and relieve your symptoms, so you can lead a full and active life. There is no cure for IBS. But it can be managed.  Diet  Your diet did not cause your IBS, but it can affect it. Diet and food choices may cause IBS symptoms in some people, but not everyone. No one diet works for everyone. Finding the best foods for you may take trial and error. Keep a food log to help find what foods made your symptoms worse. Below are some tips that may help you.    Eat more slowly. Eat smaller amounts at a time, but more often. Remember, you can always eat more, but cannot eat less once you ve eaten too much.    In general, fiber is very helpful for both constipation and diarrhea. However, insoluble fiber can worsen bloating, cramping, and gas. Insoluble fiber can be found in wheat bran, certain vegetables, and whole-grains. Soluble fiber is in such foods as oat bran, barley, nuts, seeds, bean, lentils, peas, and some fruits and vegetables. Increase fiber slowly and choose a product that includes soluble fiber. It helps to keep a food  diary and write down the symptoms you have with certain foods.    Try lactose-free dairy products. many people are intolerant to lactose.    Try cutting out foods that are high in fat and fatty meats.    You can control bloating or passing excess gas. Be careful with  gassy  vegetables and fruits like beans, cabbage, broccoli, and cauliflower.  Other foods called FODMAPs are a type of carbohydrate that can cause these symptoms and diarrhea. They are poorly digestible carbohydrates. Some FODMAP examples include honey, apples, pears, nectarines, lima beans, and cabbage. Talk with your provider about how to choose low FODMAP foods if you are sensitive to them.    Be careful with carbonated beverages and fruit juices. They can make your bloating and diarrhea worse.    Caffeine, alcohol, and stimulants can make symptoms worse. These include coffee, tea, sodas, energy drinks, and chocolate.    IBS diet guidelines can be confusing. Ask your provider for a referral to a registered dietitian. This professional can help you make sense of IBS diet suggestions.  Lifestyle    Look for factors that seem to worsen your symptoms. These include stress and emotions.     Although stress does not cause IBS, it may trigger flare-ups. Counseling can help you learn to handle stress. So can self-help measures like exercise, yoga, and meditation.    Depression can occur along with IBS. Your healthcare provider may prescribe antidepressant medicine. This may help with diarrhea, constipation, and cramping, as well as with symptoms of depression.    Smoking doesn't cause IBS, but can make the symptoms worse.  Medicines  Your healthcare provider may prescribe medicines. Take them as directed. For acute flare-ups of your illness, your provider may give you prescription medicines.    Check with your healthcare provider before taking any medicines for diarrhea.    Don't take anti-inflammatory medicines like ibuprofen or naproxen.    Long-term  medicines can be helpful for chronic symptoms    If you have lost enough weight to make you need nutritional supplements, talk to your provider. This may point to another more serious condition.  Follow-up care  Follow up with your healthcare provider, or as advised.  When to seek medical advice  Call your healthcare provider right away if any of these occur:    Belly pain gets worse or does not improve after a bowel movement    Constant belly pain moves to the right-lower belly    You can't keep liquids down because of vomiting    You have severe, persistent diarrhea    You have blood (red or black color) or mucus in your stool    You have lost significant weight    You feel very weak or dizzy, faint, or have extreme thirst    You have a fever of 100.4 F (38.0 C) or higher, or as directed by your healthcare provider  Date Last Reviewed: 6/1/2018 2000-2018 [employer]. [address] [place] [address]. All rights reserved. This information is not intended as a substitute for professional medical care. Always follow your healthcare professional's instructions.           Patient Education     Diet and Lifestyle Tips for Irritable Bowel Syndrome (IBS)    Your healthcare professional may suggest some lifestyle changes to help control your IBS. Changing your diet and managing stress are two of the most important. Follow your healthcare provider s instructions and try some of the suggestions below.  Change your diet  Your diet may be an important cause of IBS symptoms. You may want to try the following:    Pay attention to what foods bother you, and avoid them. For example, dairy products are hard for some people to digest.    Drink 6 to 8 glasses of water a day.    Avoid caffeine and tobacco. These are muscle stimulants and can affect the working of your digestive tract.    Avoid alcohol, which can irritate your digestive tract and make your symptoms worse.    Eat more fiber if constipation is a  problem. Fiber makes the stool softer and easier to pass through the colon.  Reduce stress  If stress or anxiety makes your IBS symptoms worse, learning how to manage stress may help you feel better. Try these tips:    Identify the causes of stress in your life.    Learn new ways to cope with them.    Regular exercise is a great way to relieve stress. It can also help ease constipation.  Date Last Reviewed: 7/1/2016 2000-2018 [employer]. [address] [place] [address], [place] [address]. All rights reserved. This information is not intended as a substitute for professional medical care. Always follow your healthcare professional's instructions.

## 2019-06-14 PROBLEM — K58.2 IRRITABLE BOWEL SYNDROME WITH BOTH CONSTIPATION AND DIARRHEA: Status: ACTIVE | Noted: 2019-06-14

## 2019-06-14 LAB — TSH SERPL DL<=0.005 MIU/L-ACNC: 1.85 MU/L (ref 0.4–4)

## 2019-06-14 PROCEDURE — 87338 HPYLORI STOOL AG IA: CPT | Performed by: FAMILY MEDICINE

## 2019-06-14 PROCEDURE — 87493 C DIFF AMPLIFIED PROBE: CPT | Mod: 59 | Performed by: FAMILY MEDICINE

## 2019-06-14 PROCEDURE — 87506 IADNA-DNA/RNA PROBE TQ 6-11: CPT | Performed by: FAMILY MEDICINE

## 2019-06-15 DIAGNOSIS — K58.2 IRRITABLE BOWEL SYNDROME WITH BOTH CONSTIPATION AND DIARRHEA: ICD-10-CM

## 2019-06-17 LAB
C COLI+JEJUNI+LARI FUSA STL QL NAA+PROBE: NOT DETECTED
C DIFF TOX B STL QL: NEGATIVE
EC STX1 GENE STL QL NAA+PROBE: NOT DETECTED
EC STX2 GENE STL QL NAA+PROBE: NOT DETECTED
ENTERIC PATHOGEN COMMENT: NORMAL
NOROV GI+II ORF1-ORF2 JNC STL QL NAA+PR: NOT DETECTED
RVA NSP5 STL QL NAA+PROBE: NOT DETECTED
SALMONELLA SP RPOD STL QL NAA+PROBE: NOT DETECTED
SHIGELLA SP+EIEC IPAH STL QL NAA+PROBE: NOT DETECTED
SPECIMEN SOURCE: NORMAL
V CHOL+PARA RFBL+TRKH+TNAA STL QL NAA+PR: NOT DETECTED
Y ENTERO RECN STL QL NAA+PROBE: NOT DETECTED

## 2019-06-18 LAB
H PYLORI AG STL QL IA: NORMAL
SPECIMEN SOURCE: NORMAL

## 2019-06-27 ENCOUNTER — MYC MEDICAL ADVICE (OUTPATIENT)
Dept: FAMILY MEDICINE | Facility: CLINIC | Age: 29
End: 2019-06-27

## 2019-06-27 ENCOUNTER — MYC MEDICAL ADVICE (OUTPATIENT)
Dept: PHARMACY | Facility: CLINIC | Age: 29
End: 2019-06-27

## 2019-06-27 NOTE — TELEPHONE ENCOUNTER
6-27-19  '    Hi Kirby,     I am switching primary care providers and submitted refill prescriptions to the pharmacy. But don't want it to get lost in the shuffle. Are you able to approve the requests for spirnolactone, amlodopine, and hydroclorothiozide?      Who is the new pcp?    Kirby Peralta [facility].  Medication Therapy Management Provider  [phone]

## 2019-06-27 NOTE — TELEPHONE ENCOUNTER
6-27-19      Dr Katy Sanchez. But if it is easier, I can always go in a get an appointment with her. I should get a yearly physical as well. I should be okay until then.    mtm will have her see pcp for physical and new bp med refills.    Kirby Peralta [facility].  Medication Therapy Management Provider  [phone]

## 2019-06-28 DIAGNOSIS — I10 ESSENTIAL HYPERTENSION: ICD-10-CM

## 2019-06-28 RX ORDER — AMLODIPINE BESYLATE 10 MG/1
10 TABLET ORAL DAILY
Qty: 90 TABLET | Refills: 0 | Status: SHIPPED | OUTPATIENT
Start: 2019-06-28 | End: 2019-08-13

## 2019-06-28 RX ORDER — HYDROCHLOROTHIAZIDE 25 MG/1
25 TABLET ORAL DAILY
Qty: 90 TABLET | Refills: 0 | Status: SHIPPED | OUTPATIENT
Start: 2019-06-28 | End: 2019-08-13

## 2019-06-28 NOTE — TELEPHONE ENCOUNTER
June 28, 2019    Pending Prescriptions:                       Disp   Refills    spironolactone (ALDACTONE) 25 MG tablet   90 tab*1            Sig: Take 1 tablet (25 mg) by mouth daily    Last RX written 12/13/18 #90, 1 refill.   Last jocelyn with Katy Sanchez 9/10/18.       Last RX was written for 25mg daily. When patient seen MTM 1/2019- added note, patient is taking 12.5mg daily.   Routed to PCP for review and reccomendations.    Ashley Lyon

## 2019-06-28 NOTE — TELEPHONE ENCOUNTER
"Requested Prescriptions   Pending Prescriptions Disp Refills     spironolactone (ALDACTONE) 25 MG tablet 90 tablet 1     Sig: Take 1 tablet (25 mg) by mouth daily    Last Written Prescription Date:  12/13/18  Last Fill Quantity: 90 tablet,  # refills: 1   Last office visit: 6/13/2019 with prescribing provider:  Simin   Future Office Visit:   Next 5 appointments (look out 90 days)    Jul 09, 2019  3:10 PM CDT  Jose Raul Mcgovern with Page Gregory PA-C  [facility] [id number] [place] [id number]  [phone]   Jul 11, 2019  3:30 PM CDT  SHORT with Kirby Peralta RPH  [facility] ([facility]) [id number] [place] [address]  [phone]   Jul 17, 2019  3:30 PM CDT  (Arrive by 3:20 PM)  Return Visit with LENORA Javier [facility] ([facility]) [address] [place] [address]  [phone]                Diuretics (Including Combos) Protocol Passed - 6/28/2019  9:10 AM        Passed - Blood pressure under 140/90 in past 12 months     BP Readings from Last 3 Encounters:   06/13/19 104/62   05/14/19 136/60   04/18/19 120/70                 Passed - Recent (12 mo) or future (30 days) visit within the authorizing provider's specialty     Patient had office visit in the last 12 months or has a visit in the next 30 days with authorizing provider or within the authorizing provider's specialty.  See \"Patient Info\" tab in inbasket, or \"Choose Columns\" in Meds & Orders section of the refill encounter.              Passed - Medication is active on med list        Passed - Patient is age 18 or older        Passed - No active pregancy on record        Passed - Normal serum creatinine on file in past 12 months     Recent Labs   Lab Test 04/18/19  1729   CR 0.84              Passed - Normal serum potassium on file in past 12 months     Recent Labs "   Lab Test 04/18/19  1729   POTASSIUM 3.9                    Passed - Normal serum sodium on file in past 12 months     Recent Labs   Lab Test 04/18/19  1729                 Passed - No positive pregnancy test in past 12 months

## 2019-06-28 NOTE — TELEPHONE ENCOUNTER
"Requested Prescriptions   Pending Prescriptions Disp Refills     amLODIPine (NORVASC) 10 MG tablet 90 tablet 1     Sig: Take 1 tablet (10 mg) by mouth daily    Last Written Prescription Date: 12/13/18   Last Fill Quantity: 90 tablet,  # refills: 1   Last office visit: 6/13/2019 with prescribing provider:  Simin   Future Office Visit:   Next 5 appointments (look out 90 days)    Jul 09, 2019  3:10 PM CDT  Jose Raul Mcgovern with Page Gregory PA-C  [facility] [address] [facility] [address]  [phone]   Jul 11, 2019  3:30 PM CDT  SHORT with Kirby Peralta RPH  [facility] ([facility]) [address] [facility] [address]  [phone]   Jul 17, 2019  3:30 PM CDT  (Arrive by 3:20 PM)  Return Visit with LENORA Javier [facility] ([facility]) [address] [place] [phone]  [phone]                Calcium Channel Blockers Protocol  Passed - 6/28/2019  9:15 AM        Passed - Blood pressure under 140/90 in past 12 months     BP Readings from Last 3 Encounters:   06/13/19 104/62   05/14/19 136/60   04/18/19 120/70                 Passed - Recent (12 mo) or future (30 days) visit within the authorizing provider's specialty     Patient had office visit in the last 12 months or has a visit in the next 30 days with authorizing provider or within the authorizing provider's specialty.  See \"Patient Info\" tab in inbasket, or \"Choose Columns\" in Meds & Orders section of the refill encounter.              Passed - Medication is active on med list        Passed - Patient is age 18 or older        Passed - No active pregnancy on record        Passed - Normal serum creatinine on file in past 12 months     Recent Labs   Lab Test 04/18/19  1729   CR 0.84             Passed - No positive pregnancy test in past 12 months        hydrochlorothiazide " "(HYDRODIURIL) 25 MG tablet 90 tablet 1     Sig: Take 1 tablet (25 mg) by mouth daily    Last Written Prescription Date:  12/13/18  Last Fill Quantity: 90 tablet,  # refills: 1   Last office visit: 6/13/2019 with prescribing provider: Simin    Future Office Visit:   Next 5 appointments (look out 90 days)    Jul 09, 2019  3:10 PM CDT  MyChart Long with Page Gregory PA-C  [facility] ([facility] [address] [facility] [address]  [phone]   Jul 11, 2019  3:30 PM CDT  SHORT with Kirby Peralta RPH  [facility] ([facility]) [address] [facility] [address]  [phone]   Jul 17, 2019  3:30 PM CDT  (Arrive by 3:20 PM)  Return Visit with LENORA Javier [facility] ([facility]) [id number] [place] [phone]  [phone]                Diuretics (Including Combos) Protocol Passed - 6/28/2019  9:15 AM        Passed - Blood pressure under 140/90 in past 12 months     BP Readings from Last 3 Encounters:   06/13/19 104/62   05/14/19 136/60   04/18/19 120/70                 Passed - Recent (12 mo) or future (30 days) visit within the authorizing provider's specialty     Patient had office visit in the last 12 months or has a visit in the next 30 days with authorizing provider or within the authorizing provider's specialty.  See \"Patient Info\" tab in inbasket, or \"Choose Columns\" in Meds & Orders section of the refill encounter.              Passed - Medication is active on med list        Passed - Patient is age 18 or older        Passed - No active pregancy on record        Passed - Normal serum creatinine on file in past 12 months     Recent Labs   Lab Test 04/18/19  1729   CR 0.84              Passed - Normal serum potassium on file in past 12 months     Recent Labs   Lab Test 04/18/19  1729   POTASSIUM 3.9                    Passed - Normal " serum sodium on file in past 12 months     Recent Labs   Lab Test 04/18/19  1729                 Passed - No positive pregnancy test in past 12 months

## 2019-07-01 RX ORDER — SPIRONOLACTONE 25 MG/1
12.5 TABLET ORAL DAILY
Qty: 90 TABLET | Refills: 0 | Status: SHIPPED | OUTPATIENT
Start: 2019-07-01 | End: 2019-08-13

## 2019-07-02 ENCOUNTER — MYC MEDICAL ADVICE (OUTPATIENT)
Dept: FAMILY MEDICINE | Facility: CLINIC | Age: 29
End: 2019-07-02

## 2019-07-02 NOTE — TELEPHONE ENCOUNTER
"Here is M Kamini's reply to pt:             Aylin Suárez PA-C Suess, Amanda 1 hour ago (10:59 AM)         Dr. Duval is currently out of the office.     Have you eaten any red peppers or tomatoes lately?  The skins are very fibrous and sometimes don't get digested and can appear in our stools.       I agree; likely a food substance that was passed, and not \"intestinal tissue\" as the pt suggested.   "

## 2019-07-13 ENCOUNTER — MYC MEDICAL ADVICE (OUTPATIENT)
Dept: ENDOCRINOLOGY | Facility: CLINIC | Age: 29
End: 2019-07-13

## 2019-08-12 NOTE — PROGRESS NOTES
Subjective     Zenobia Cantu is a 29 year old female who presents to clinic today for the following health issues:    HPI     Patient is looking for a different provider within [place]    Medication Followup of All Meds    Taking Medication as prescribed: yes    Side Effects:  None    Medication Helping Symptoms:  yes     Patient is here today to establish care    She notes she has a history of high blood pressure  Has seen [facility] pharmacist at  in past, [place]  She notes she is on a few medications, but her blood pressure is finally well controlled  No s/e from medication    She also notes she has a history of depression  She takes Prozac  Has noticed over the last few months the anxiety has worsened, is interested increasing dose of her Prozac to see if helpful  No SI/HI    She also requests a refill of Flonase  Takes daily for sinus congestion, this works better for her than Sudafed daily  Patient Active Problem List   Diagnosis     Major depressive disorder, recurrent episode, mild (H)     Anxiety     Morbid obesity (H)     Essential hypertension     KENY (obstructive sleep apnea)     Prediabetes     Irritable bowel syndrome with both constipation and diarrhea     Past Surgical History:   Procedure Laterality Date     INSERT INTRAUTERINE DEVICE  09/10/2018    mirena - remove 9/10/2023       Social History     Tobacco Use     Smoking status: Never Smoker     Smokeless tobacco: Never Used   Substance Use Topics     Alcohol use: Yes     Comment: rarely     Family History   Problem Relation Age of Onset     Hypertension Father         He lives a healthy lifestyle     Depression Father      Anxiety Disorder Father      Hypertension Paternal Grandmother      Hypertension Other         Believe my grandmother's mom had problems too     Diabetes Maternal Grandmother      Anxiety Disorder Sister      No Known Problems Mother      No Known Problems Maternal Grandfather      No Known Problems Paternal Grandfather      No Known  "Problems Brother          Current Outpatient Medications   Medication Sig Dispense Refill     amLODIPine (NORVASC) 10 MG tablet Take 1 tablet (10 mg) by mouth daily 90 tablet 1     Cholecalciferol (VITAMIN D) 2000 units CAPS Take 2,000 Units by mouth daily       FLUoxetine (PROZAC) 20 MG capsule Take 1 capsule (20 mg) by mouth daily In addition to 40mg daily, total 60mg daily 90 capsule 0     FLUoxetine (PROZAC) 40 MG capsule TK 1 C PO D 90 capsule 1     fluticasone (FLONASE) 50 MCG/ACT nasal spray Spray 1-2 sprays into both nostrils daily 16 g 0     hydrochlorothiazide (HYDRODIURIL) 25 MG tablet Take 1 tablet (25 mg) by mouth daily 90 tablet 1     hyoscyamine (ANASPAZ/LEVSIN) 0.125 MG tablet Take 1 tablet (125 mcg) by mouth every 4 hours as needed for cramping or diarrhea 60 tablet 3     metFORMIN (GLUCOPHAGE-XR) 500 MG 24 hr tablet TAKE 2 TABLET(500 MG) BY MOUTH TWICE DAILY WITH MEALS 360 tablet 3     olmesartan (BENICAR) 20 MG tablet Take 1 tablet (20 mg) by mouth 2 times daily 180 tablet 1     spironolactone (ALDACTONE) 25 MG tablet Take 0.5 tablets (12.5 mg) by mouth daily 90 tablet 0     levonorgestrel (MIRENA) 20 MCG/24HR IUD 1 each (20 mcg) by Intrauterine route once for 1 dose 1 each 0     Allergies   Allergen Reactions     Amoxicillin Hives     Ozempic [Semaglutide] Nausea     Seasonal Allergies        Reviewed and updated as needed this visit by Provider         Review of Systems   ROS COMP: Constitutional, HEENT, cardiovascular, pulmonary, gi and gu systems are negative, except as otherwise noted.      Objective    /72 (BP Location: Right arm, Patient Position: Chair, Cuff Size: Adult Large)   Pulse 76   Temp 98  F (36.7  C) (Oral)   Resp 16   Ht 1.854 m (6' 1\")   Wt (!) 159.3 kg (351 lb 1.6 oz)   LMP 08/01/2018 (Approximate)   SpO2 99%   Breastfeeding? No   BMI 46.32 kg/m    Body mass index is 46.32 kg/m .  Physical Exam   GENERAL: healthy, alert and no distress  NECK: no adenopathy, no " "asymmetry, masses, or scars and thyroid normal to palpation  RESP: lungs clear to auscultation - no rales, rhonchi or wheezes  CV: regular rate and rhythm, normal S1 S2, no S3 or S4, no murmur, click or rub, no peripheral edema and peripheral pulses strong  MS: no gross musculoskeletal defects noted, no edema    Diagnostic Test Results:  Labs reviewed in Epic        Assessment & Plan     1. Essential hypertension  Chronic issue, labs up to date and BP well controlled.  Meds refilled.  - amLODIPine (NORVASC) 10 MG tablet; Take 1 tablet (10 mg) by mouth daily  Dispense: 90 tablet; Refill: 1  - hydrochlorothiazide (HYDRODIURIL) 25 MG tablet; Take 1 tablet (25 mg) by mouth daily  Dispense: 90 tablet; Refill: 1  - olmesartan (BENICAR) 20 MG tablet; Take 1 tablet (20 mg) by mouth 2 times daily  Dispense: 180 tablet; Refill: 1  - spironolactone (ALDACTONE) 25 MG tablet; Take 0.5 tablets (12.5 mg) by mouth daily  Dispense: 90 tablet; Refill: 0    2. Major depressive disorder, recurrent episode, mild (H)  Chronic issue, PHQ9/GAD7 updated today.  Will increase Prozac daily total to 60mg daily.  Recheck in a few months to see if improved.  - FLUoxetine (PROZAC) 40 MG capsule; TK 1 C PO D  Dispense: 90 capsule; Refill: 1  - FLUoxetine (PROZAC) 20 MG capsule; Take 1 capsule (20 mg) by mouth daily In addition to 40mg daily, total 60mg daily  Dispense: 90 capsule; Refill: 0    3. Sinus congestion  - fluticasone (FLONASE) 50 MCG/ACT nasal spray; Spray 1-2 sprays into both nostrils daily  Dispense: 16 g; Refill: 0    4. KENY (obstructive sleep apnea)  Using CPAP       BMI:   Estimated body mass index is 46.32 kg/m  as calculated from the following:    Height as of this encounter: 1.854 m (6' 1\").    Weight as of this encounter: 159.3 kg (351 lb 1.6 oz).   Weight management plan: Discussed healthy diet and exercise guidelines        Risks, benefits and alternatives were discussed with patient. Agreeable to the plan of " care.      Return in about 5 months (around 1/13/2020) for Physical Exam.    Page Gregory PA-C  [facility]

## 2019-08-13 ENCOUNTER — OFFICE VISIT (OUTPATIENT)
Dept: FAMILY MEDICINE | Facility: CLINIC | Age: 29
End: 2019-08-13
Payer: COMMERCIAL

## 2019-08-13 VITALS
RESPIRATION RATE: 16 BRPM | HEART RATE: 76 BPM | OXYGEN SATURATION: 99 % | TEMPERATURE: 98 F | DIASTOLIC BLOOD PRESSURE: 72 MMHG | HEIGHT: 72 IN | SYSTOLIC BLOOD PRESSURE: 132 MMHG | BODY MASS INDEX: 39.68 KG/M2 | WEIGHT: 293 LBS

## 2019-08-13 DIAGNOSIS — R09.81 SINUS CONGESTION: ICD-10-CM

## 2019-08-13 DIAGNOSIS — F33.0 MAJOR DEPRESSIVE DISORDER, RECURRENT EPISODE, MILD (H): ICD-10-CM

## 2019-08-13 DIAGNOSIS — I10 ESSENTIAL HYPERTENSION: Primary | ICD-10-CM

## 2019-08-13 DIAGNOSIS — G47.33 OSA (OBSTRUCTIVE SLEEP APNEA): ICD-10-CM

## 2019-08-13 PROCEDURE — 99213 OFFICE O/P EST LOW 20 MIN: CPT | Performed by: PHYSICIAN ASSISTANT

## 2019-08-13 RX ORDER — SPIRONOLACTONE 25 MG/1
12.5 TABLET ORAL DAILY
Qty: 90 TABLET | Refills: 0 | Status: SHIPPED | OUTPATIENT
Start: 2019-08-13 | End: 2020-03-16

## 2019-08-13 RX ORDER — OLMESARTAN MEDOXOMIL 20 MG/1
20 TABLET ORAL
Qty: 180 TABLET | Refills: 1 | Status: SHIPPED | OUTPATIENT
Start: 2019-08-13 | End: 2020-03-16

## 2019-08-13 RX ORDER — AMLODIPINE BESYLATE 10 MG/1
10 TABLET ORAL DAILY
Qty: 90 TABLET | Refills: 1 | Status: SHIPPED | OUTPATIENT
Start: 2019-08-13 | End: 2020-03-16

## 2019-08-13 RX ORDER — FLUOXETINE 40 MG/1
CAPSULE ORAL
Qty: 90 CAPSULE | Refills: 1 | Status: SHIPPED | OUTPATIENT
Start: 2019-08-13 | End: 2020-03-16

## 2019-08-13 RX ORDER — FLUTICASONE PROPIONATE 50 MCG
1-2 SPRAY, SUSPENSION (ML) NASAL DAILY
Qty: 16 G | Refills: 0 | Status: SHIPPED | OUTPATIENT
Start: 2019-08-13 | End: 2019-10-02

## 2019-08-13 RX ORDER — HYDROCHLOROTHIAZIDE 25 MG/1
25 TABLET ORAL DAILY
Qty: 90 TABLET | Refills: 1 | Status: SHIPPED | OUTPATIENT
Start: 2019-08-13 | End: 2020-03-16

## 2019-08-13 ASSESSMENT — ANXIETY QUESTIONNAIRES
6. BECOMING EASILY ANNOYED OR IRRITABLE: SEVERAL DAYS
IF YOU CHECKED OFF ANY PROBLEMS ON THIS QUESTIONNAIRE, HOW DIFFICULT HAVE THESE PROBLEMS MADE IT FOR YOU TO DO YOUR WORK, TAKE CARE OF THINGS AT HOME, OR GET ALONG WITH OTHER PEOPLE: SOMEWHAT DIFFICULT
7. FEELING AFRAID AS IF SOMETHING AWFUL MIGHT HAPPEN: NOT AT ALL
1. FEELING NERVOUS, ANXIOUS, OR ON EDGE: SEVERAL DAYS
5. BEING SO RESTLESS THAT IT IS HARD TO SIT STILL: SEVERAL DAYS
GAD7 TOTAL SCORE: 5
2. NOT BEING ABLE TO STOP OR CONTROL WORRYING: SEVERAL DAYS
3. WORRYING TOO MUCH ABOUT DIFFERENT THINGS: SEVERAL DAYS

## 2019-08-13 ASSESSMENT — PATIENT HEALTH QUESTIONNAIRE - PHQ9
5. POOR APPETITE OR OVEREATING: NOT AT ALL
SUM OF ALL RESPONSES TO PHQ QUESTIONS 1-9: 3

## 2019-08-13 ASSESSMENT — MIFFLIN-ST. JEOR: SCORE: 2450.46

## 2019-08-14 ASSESSMENT — ANXIETY QUESTIONNAIRES: GAD7 TOTAL SCORE: 5

## 2019-09-12 ENCOUNTER — MYC MEDICAL ADVICE (OUTPATIENT)
Dept: FAMILY MEDICINE | Facility: CLINIC | Age: 29
End: 2019-09-12

## 2019-09-12 NOTE — TELEPHONE ENCOUNTER
Called patient to offer appointment and triage. Left voicemail and generic message to return call.    Also sent [facility] message. Covering provider willing to see today.

## 2019-09-12 NOTE — TELEPHONE ENCOUNTER
Called patient to further triage. She does state the blisters look similar to when she had some poison ivy exposure. Now remembering that two weeks ago she was cleaning up at one of the [facility] taylor, she could have had exposure and that was when she started to notice her current symptoms.   She has appt scheduled in [place] for next Friday. Not able to come in today. Now noting she had a similar blistering rash on arm and leg (now resolved).     Wondering if rash and symptoms with shrimp ingestion may be two separate incidents? Advised patient of UC hours in [place] and [place] if she is really in need of treatment today. Otherwise, her PCP's schedule may open up tomorrow, advised she call at 8 a.m. To see if appts available.  Patient stated understanding and is in agreement with plan.    Urmila MCNEIL, Triage RN

## 2019-09-13 ENCOUNTER — OFFICE VISIT (OUTPATIENT)
Dept: FAMILY MEDICINE | Facility: CLINIC | Age: 29
End: 2019-09-13
Payer: COMMERCIAL

## 2019-09-13 ENCOUNTER — MYC MEDICAL ADVICE (OUTPATIENT)
Dept: FAMILY MEDICINE | Facility: CLINIC | Age: 29
End: 2019-09-13

## 2019-09-13 VITALS
HEIGHT: 72 IN | OXYGEN SATURATION: 98 % | WEIGHT: 293 LBS | TEMPERATURE: 99.1 F | RESPIRATION RATE: 22 BRPM | DIASTOLIC BLOOD PRESSURE: 70 MMHG | BODY MASS INDEX: 39.68 KG/M2 | HEART RATE: 78 BPM | SYSTOLIC BLOOD PRESSURE: 118 MMHG

## 2019-09-13 DIAGNOSIS — T78.1XXA ADVERSE FOOD REACTION, INITIAL ENCOUNTER: ICD-10-CM

## 2019-09-13 DIAGNOSIS — R21 RASH: Primary | ICD-10-CM

## 2019-09-13 PROCEDURE — 86003 ALLG SPEC IGE CRUDE XTRC EA: CPT | Performed by: PHYSICIAN ASSISTANT

## 2019-09-13 PROCEDURE — 82785 ASSAY OF IGE: CPT | Performed by: PHYSICIAN ASSISTANT

## 2019-09-13 PROCEDURE — 99213 OFFICE O/P EST LOW 20 MIN: CPT | Performed by: PHYSICIAN ASSISTANT

## 2019-09-13 PROCEDURE — 36415 COLL VENOUS BLD VENIPUNCTURE: CPT | Performed by: PHYSICIAN ASSISTANT

## 2019-09-13 ASSESSMENT — MIFFLIN-ST. JEOR: SCORE: 2442.75

## 2019-09-13 NOTE — PROGRESS NOTES
Subjective     Zenobia Cantu is a 29 year old female who presents to clinic today for the following health issues:    HPI   Rash      Duration: 2 Weeks    Description  Location: Right pointer finger  Itching: mild    Intensity:  mild    Accompanying signs and symptoms: Red, Blistering    History (similar episodes/previous evaluation): None    Precipitating or alleviating factors:  New exposures:  Went for a walk in a wooded area, stayed on path did clean up dogs poop from ground.   Recent travel: no      Therapies tried and outcome: Anti fungal cream    Patient is here today for evaluation of rash  Ongoing for about 2 weeks on finger  Was itchy and flaky, now more bumpy  She has tried anti-fungal cream without relief    She is also requesting food allergy testing  Only at restaurants when she eats shrimp she gets congestion, post nasal drip and feels like throat is a bit swollen    Patient Active Problem List   Diagnosis     Major depressive disorder, recurrent episode, mild (H)     Anxiety     Morbid obesity (H)     Essential hypertension     KENY (obstructive sleep apnea)     Prediabetes     Irritable bowel syndrome with both constipation and diarrhea     Past Surgical History:   Procedure Laterality Date     INSERT INTRAUTERINE DEVICE  09/10/2018    mirena - remove 9/10/2023       Social History     Tobacco Use     Smoking status: Never Smoker     Smokeless tobacco: Never Used   Substance Use Topics     Alcohol use: Yes     Comment: rarely     Family History   Problem Relation Age of Onset     Hypertension Father         He lives a healthy lifestyle     Depression Father      Anxiety Disorder Father      Hypertension Paternal Grandmother      Hypertension Other         Believe my grandmother's mom had problems too     Diabetes Maternal Grandmother      Anxiety Disorder Sister      No Known Problems Mother      No Known Problems Maternal Grandfather      No Known Problems Paternal Grandfather      No Known Problems  "Brother          Current Outpatient Medications   Medication Sig Dispense Refill     amLODIPine (NORVASC) 10 MG tablet Take 1 tablet (10 mg) by mouth daily 90 tablet 1     Cholecalciferol (VITAMIN D) 2000 units CAPS Take 2,000 Units by mouth daily       FLUoxetine (PROZAC) 20 MG capsule Take 1 capsule (20 mg) by mouth daily In addition to 40mg daily, total 60mg daily 90 capsule 0     FLUoxetine (PROZAC) 40 MG capsule TK 1 C PO D 90 capsule 1     fluticasone (FLONASE) 50 MCG/ACT nasal spray Spray 1-2 sprays into both nostrils daily 16 g 0     hydrochlorothiazide (HYDRODIURIL) 25 MG tablet Take 1 tablet (25 mg) by mouth daily 90 tablet 1     hyoscyamine (ANASPAZ/LEVSIN) 0.125 MG tablet Take 1 tablet (125 mcg) by mouth every 4 hours as needed for cramping or diarrhea 60 tablet 3     metFORMIN (GLUCOPHAGE-XR) 500 MG 24 hr tablet TAKE 2 TABLET(500 MG) BY MOUTH TWICE DAILY WITH MEALS 360 tablet 3     olmesartan (BENICAR) 20 MG tablet Take 1 tablet (20 mg) by mouth 2 times daily 180 tablet 1     spironolactone (ALDACTONE) 25 MG tablet Take 0.5 tablets (12.5 mg) by mouth daily 90 tablet 0     levonorgestrel (MIRENA) 20 MCG/24HR IUD 1 each (20 mcg) by Intrauterine route once for 1 dose 1 each 0     Allergies   Allergen Reactions     Amoxicillin Hives     Ozempic [Semaglutide] Nausea     Seasonal Allergies        Reviewed and updated as needed this visit by Provider         Review of Systems   ROS COMP: Constitutional, HEENT, cardiovascular, pulmonary, gi and gu systems are negative, except as otherwise noted.      Objective    /70 (BP Location: Right arm, Patient Position: Chair, Cuff Size: Adult Large)   Pulse 78   Temp 99.1  F (37.3  C) (Tympanic)   Resp 22   Ht 1.854 m (6' 1\")   Wt (!) 158.5 kg (349 lb 6.4 oz)   SpO2 98%   BMI 46.10 kg/m    Body mass index is 46.1 kg/m .  Physical Exam   GENERAL: healthy, alert and no distress  NECK: no adenopathy, no asymmetry, masses, or scars and thyroid normal to " palpation  RESP: lungs clear to auscultation - no rales, rhonchi or wheezes  CV: regular rate and rhythm, normal S1 S2, no S3 or S4, no murmur, click or rub, no peripheral edema and peripheral pulses strong  MS: no gross musculoskeletal defects noted, no edema  SKIN: 2nd digit on left hand erythematous patch of skin, bumpy    Diagnostic Test Results:  none         Assessment & Plan     1. Rash  New problem, recommend hydrocortisone cream BID for 2 weeks, stop use after 2 weeks as can thin and discolor the skin.  F/U if symptoms worsen or do not improve.    2. Adverse food reaction, initial encounter  Labs done today per patient request.  - Allergy adult food panel     Risks, benefits and alternatives were discussed with patient. Agreeable to the plan of care.      Return in about 1 week (around 9/20/2019) for If symptoms worsen or fail to improve.    Page Gregory PA-C  [facility]

## 2019-09-17 ENCOUNTER — MYC MEDICAL ADVICE (OUTPATIENT)
Dept: FAMILY MEDICINE | Facility: CLINIC | Age: 29
End: 2019-09-17

## 2019-09-17 LAB
ALMOND IGE QN: <0.1 KU(A)/L
CASHEW NUT IGE QN: <0.1 KU(A)/L
CODFISH IGE QN: <0.1 KU(A)/L
COW MILK IGE QN: <0.1 KU(A)/L
EGG WHITE IGE QN: <0.1 KU(A)/L
HAZELNUT IGE QN: <0.1 KU(A)/L
IGE SERPL-ACNC: 51 KIU/L (ref 0–114)
PEANUT IGE QN: <0.1 KU(A)/L
SALMON IGE QN: <0.1 KU(A)/L
SCALLOP IGE QN: <0.1 KU(A)/L
SESAME SEED IGE QN: <0.1 KU(A)/L
SHRIMP IGE QN: <0.1 KU(A)/L
SOYBEAN IGE QN: <0.1 KU(A)/L
TUNA IGE QN: <0.1 KU(A)/L
WALNUT IGE QN: <0.1 KU(A)/L
WHEAT IGE QN: <0.1 KU(A)/L

## 2019-10-02 ENCOUNTER — MYC REFILL (OUTPATIENT)
Dept: FAMILY MEDICINE | Facility: CLINIC | Age: 29
End: 2019-10-02

## 2019-10-02 DIAGNOSIS — R09.81 SINUS CONGESTION: ICD-10-CM

## 2019-10-02 RX ORDER — FLUTICASONE PROPIONATE 50 MCG
1-2 SPRAY, SUSPENSION (ML) NASAL DAILY
Qty: 16 G | Refills: 0 | Status: CANCELLED | OUTPATIENT
Start: 2019-10-02

## 2019-10-02 RX ORDER — FLUTICASONE PROPIONATE 50 MCG
SPRAY, SUSPENSION (ML) NASAL
Qty: 16 ML | Refills: 0 | Status: SHIPPED | OUTPATIENT
Start: 2019-10-02 | End: 2019-11-04

## 2019-10-02 NOTE — TELEPHONE ENCOUNTER
"Requested Prescriptions   Pending Prescriptions Disp Refills     fluticasone (FLONASE) 50 MCG/ACT nasal spray [Pharmacy Med Name: FLUTICASONE 50MCG NASAL SP (120) RX] 16 mL 0     Sig: INHALE 1 TO 2 SPRAYS IN EACH NOSTRIL DAILY  Last Written Prescription Date:  08/13/2019  Last Fill Quantity: 16g,  # refills: 0   Last office visit: 9/13/2019 with prescribing provider:  Page Gregory PA-C    Future Office Visit:           Inhaled Steroids Protocol Passed - 10/2/2019  8:43 AM        Passed - Patient is age 12 or older        Passed - Recent (12 mo) or future (30 days) visit within the authorizing provider's specialty     Patient has had an office visit with the authorizing provider or a provider within the authorizing providers department within the previous 12 mos or has a future within next 30 days. See \"Patient Info\" tab in inbasket, or \"Choose Columns\" in Meds & Orders section of the refill encounter.              Passed - Medication is active on med list          "

## 2019-10-10 DIAGNOSIS — F33.0 MAJOR DEPRESSIVE DISORDER, RECURRENT EPISODE, MILD (H): ICD-10-CM

## 2019-10-10 NOTE — TELEPHONE ENCOUNTER
"Requested Prescriptions   Pending Prescriptions Disp Refills     FLUoxetine (PROZAC) 20 MG capsule [Pharmacy Med Name: FLUOXETINE 20MG CAPSULES] 60 capsule 0     Sig: TAKE 1 CAPSULE BY MOUTH DAILY(ALONG WITH 40 MG)   Last Written Prescription Date:  8/13/19  Last Fill Quantity: 90,  # refills: 0   Last office visit: 9/13/2019 with prescribing provider:  Page Gregory PA-C   Future Office Visit:        SSRIs Protocol Passed - 10/10/2019 11:59 AM        Passed - PHQ-9 score less than 5 in past 6 months     Please review last PHQ-9 score.   PHQ-9 SCORE 9/10/2018 5/14/2019 8/13/2019   PHQ-9 Total Score MyChart 1 (Minimal depression) - -   PHQ-9 Total Score 1 2 3     RUBEN-7 SCORE 7/31/2017 9/10/2018 8/13/2019   Total Score - 7 (mild anxiety) -   Total Score 11 7 5               Passed - Medication is active on med list        Passed - Patient is age 18 or older        Passed - No active pregnancy on record        Passed - No positive pregnancy test in last 12 months        Passed - Recent (6 mo) or future (30 days) visit within the authorizing provider's specialty     Patient had office visit in the last 6 months or has a visit in the next 30 days with authorizing provider or within the authorizing provider's specialty.  See \"Patient Info\" tab in inbasket, or \"Choose Columns\" in Meds & Orders section of the refill encounter.             "

## 2019-10-18 ENCOUNTER — MYC MEDICAL ADVICE (OUTPATIENT)
Dept: PHARMACY | Facility: CLINIC | Age: 29
End: 2019-10-18

## 2019-10-18 NOTE — TELEPHONE ENCOUNTER
10-18-19    Brynn Orr,     Life has been crazy with taking 5 classes and working full time. Sorry I haven't been able to come in. I thought things were going to slow down in the fall. Guess not. :)   Weight is down to 343 and blood pressure is staying around 120/80, sometimes 116/73 and sometimes 128/79. :)      mtm notes she is doing great -stick to the plan!    [place]eas[place]

## 2019-10-31 ENCOUNTER — OFFICE VISIT (OUTPATIENT)
Dept: ENDOCRINOLOGY | Facility: CLINIC | Age: 29
End: 2019-10-31
Payer: COMMERCIAL

## 2019-10-31 VITALS
BODY MASS INDEX: 45.65 KG/M2 | HEART RATE: 100 BPM | SYSTOLIC BLOOD PRESSURE: 111 MMHG | WEIGHT: 293 LBS | TEMPERATURE: 98.2 F | RESPIRATION RATE: 16 BRPM | OXYGEN SATURATION: 97 % | DIASTOLIC BLOOD PRESSURE: 69 MMHG

## 2019-10-31 DIAGNOSIS — R73.01 IFG (IMPAIRED FASTING GLUCOSE): ICD-10-CM

## 2019-10-31 DIAGNOSIS — E66.01 MORBID OBESITY (H): Primary | ICD-10-CM

## 2019-10-31 PROCEDURE — 99213 OFFICE O/P EST LOW 20 MIN: CPT | Performed by: CLINICAL NURSE SPECIALIST

## 2019-10-31 RX ORDER — METFORMIN HCL 500 MG
TABLET, EXTENDED RELEASE 24 HR ORAL
Qty: 360 TABLET | Refills: 3 | Status: SHIPPED | OUTPATIENT
Start: 2019-10-31 | End: 2020-04-16

## 2019-10-31 NOTE — PROGRESS NOTES
Name: Zenobia Cantu  F/u for obesity (Last seen 4/218/2019).  HPI:  Zenobia Cantu is a 29 year old female who presents for the managment of obestiy  Has tried many different things for weight loss - over the counter weight loss supplements, weight watchers online, food diary/calorie tracking.  History of eating disorder. Binge eating disorder.  Currently not in an eating disorder program.      Prediabetes, currently treated with Metformin 1000 mg bid.  Trial of Ozempic caused GI side effects.  Sleep Apnea.  On Cpap  Hypertension or CAD: No CAD, + HTN, currently treated with Amlodipine 10 mg every day, HCTZ 25 mg every day, Benicar 20 mg bid, and spironolactone 12.5 mg daily.    Vital Signs 9/10/2018 9/17/2018 1/3/2019   Systolic 134 134 128   Diastolic 74 82 66     Diet: portion control  Exercise:Yes: 5 days per week, cardio and weights, [employer], working out up to 1.5 hours per day.  Did not tolerate phentermine due to severe insomnia  Contrave not covered by insurance. Did not tolerate wellbutrin and naltrexone when prescribed separately due to ineffectiveness.  Prefers to just keep working on weight loss without medication.  PMH/PSH:  Past Medical History:   Diagnosis Date     Depressive disorder 2010     Essential hypertension 8/28/2017     Past Surgical History:   Procedure Laterality Date     INSERT INTRAUTERINE DEVICE  09/10/2018    mirena - remove 9/10/2023     Family Hx:  Family History   Problem Relation Age of Onset     Hypertension Father         He lives a healthy lifestyle     Depression Father      Anxiety Disorder Father      Hypertension Paternal Grandmother      Hypertension Other         Believe my grandmother's mom had problems too     Diabetes Maternal Grandmother      Anxiety Disorder Sister      No Known Problems Mother      No Known Problems Maternal Grandfather      No Known Problems Paternal Grandfather      No Known Problems Brother      Thyroid disease: No         DM2: Yes:  MGM         Autoimmune: DM1, SLE, RA, Vitiligo No    Social Hx:  Social History     Socioeconomic History     Marital status:      Spouse name: Ish     Number of children: 0     Years of education: Not on file     Highest education level: Not on file   Occupational History     Occupation: operations     Comment: school for medical coding   Social Needs     Financial resource strain: Not on file     Food insecurity:     Worry: Not on file     Inability: Not on file     Transportation needs:     Medical: Not on file     Non-medical: Not on file   Tobacco Use     Smoking status: Never Smoker     Smokeless tobacco: Never Used   Substance and Sexual Activity     Alcohol use: Yes     Comment: rarely     Drug use: No     Sexual activity: Yes     Partners: Male     Birth control/protection: Condom, I.U.D.   Lifestyle     Physical activity:     Days per week: Not on file     Minutes per session: Not on file     Stress: Not on file   Relationships     Social connections:     Talks on phone: Not on file     Gets together: Not on file     Attends Mandaen service: Not on file     Active member of club or organization: Not on file     Attends meetings of clubs or organizations: Not on file     Relationship status: Not on file     Intimate partner violence:     Fear of current or ex partner: Not on file     Emotionally abused: Not on file     Physically abused: Not on file     Forced sexual activity: Not on file   Other Topics Concern     Parent/sibling w/ CABG, MI or angioplasty before 65F 55M? No   Social History Narrative     Not on file          MEDICATIONS:  has a current medication list which includes the following prescription(s): metformin, amlodipine, vitamin d, fluoxetine, fluoxetine, fluticasone, hydrochlorothiazide, hyoscyamine, levonorgestrel, olmesartan, and spironolactone.    ROS   ROS: 10 point ROS neg other than the symptoms noted above in the HPI.    Physical Exam   VS: /69 (BP Location: Right  "arm, Patient Position: Chair, Cuff Size: Adult Large)   Pulse 100   Temp 98.2  F (36.8  C) (Oral)   Resp 16   Wt (!) 156.9 kg (346 lb)   SpO2 97%   Breastfeeding? No   BMI 45.65 kg/m    GENERAL: AXOX3, NAD, well dressed, answering questions appropriately, appears stated age.  HEENT: no exopthalmous, no proptosis, no lig lag, no retraction  CV: RRR  LUNGS: CTAB  EXTREMITIES: no edema  NEUROLOGY: CN grossly intact, no tremors  MSK: grossly intac    LABS:  !COMPREHENSIVE Latest Ref Rng & Units 4/18/2019   SODIUM 133 - 144 mmol/L 136   POTASSIUM 3.4 - 5.3 mmol/L 3.9   CHLORIDE 94 - 109 mmol/L 103   BUN 7 - 30 mg/dL 11   Creatinine 0.52 - 1.04 mg/dL 0.84   Glucose 70 - 99 mg/dL 128 (H)   ANION GAP 3 - 14 mmol/L 8   CALCIUM 8.5 - 10.1 mg/dL 9.4     Component      Latest Ref Rng & Units 8/12/2017 6/18/2018   Hemoglobin A1C      0 - 5.6 % 5.7 5.9 (H)     Component    Latest Ref Rng & Units 8/12/2017   Glucose    70 - 99 mg/dL 103 (H)   Insulin   3 - 25 mU/L 21.7   C-Peptide    0.9 - 6.9 ng/mL 3.3   Hemoglobin A1C    4.3 - 6.0 % 5.7     !LIPID/HEPATIC Latest Ref Rng & Units 8/12/2017   CHOLESTEROL <200 mg/dL 128   TRIGLYCERIDES <150 mg/dL 105   HDL CHOLESTEROL >49 mg/dL 42 (L)   LDL CHOLESTEROL, CALCULATED <100 mg/dL 65   NON HDL CHOLESTEROL <130 mg/dL 86     !THYROID Latest Ref Rng & Units 7/25/2017   TSH 0.40 - 4.00 mU/L 1.90     Component    Latest Ref Rng & Units 7/25/2017   WBC    4.0 - 11.0 10e9/L 5.8   RBC Count    3.8 - 5.2 10e12/L 4.37   Hemoglobin    11.7 - 15.7 g/dL 13.3   Hematocrit    35.0 - 47.0 % 40.3     Vital Signs 4/18/2019   Weight (LB) 361 lb 14.4 oz   Height    BMI (Calculated) 47.75     Vital Signs 10/31/2019   Weight (LB) 346 lb   Height    BMI (Calculated) 45.65     Waist Circumference: 63\" (8/2/2017).  62\" (11/6/2017).  62\" (3/2018).  61.75\" (4/2019) 60\" (today)  All pertinent notes, labs, and images personally reviewed by me.     A/P  Ms.Amanda Cantu is a 29 year old here for the management " of obestiy:    1. Morbid Obesity-  BMI 47-48 with comorbidities - HTN, prediabetes, KENY.    Obesity is associated with a significant increase in mortality and risk of many disorders, including diabetes mellitus, hypertension, dyslipidemia, heart disease, stroke, sleep apnea, cancer, and many others. Conversely, weight loss is associated with a reduction in obesity-associated morbidity.    Endocrine evaluation of obesity includes Diabetes/prediabetes, Cushing's and thyroid dysfunction.  The relevant work up for the diagnosis and management of obesity and various treatment options were discussed. Body Mass Index (BMI) has been a standard means for calculating risk for overweight and obesity. The new American Association of Clinical Endocrinology (AACE) algorithm recommends lifestyle modifications as the initial phase of treatment for all patients with the BMI equal or greater than 25 kg/m2. Lifestyle modifications includes use of medical nutrition therapy, exercise, tobacco cessation, adequate quality and quantity of sleep, limited consumption of alcohol and reduced stress through implementation of a structured, multidisciplinary program.    In patients with complications associated with obesity, graded interventions are recommended including pharmacological therapy such as phentermine, orlistat, lorcaserin and phentermine/topiramate ER, contrave ( buproprion/naltreone) and the use of very low calorie meal replacement programs.    If medical intervention is insufficient, surgical therapy may be considered, especially in patients with BMI greater than or equal to 35 kg/m2 with multiple complications. Surgical treatments include lap-band, gastric sleeve or gastric bypass surgery.    I informed the pt that:  1.Weight loss medications can be taken to assist with weight reduction when combined with appropriate healthy lifestyle changes.  2.I discussed possible s/e, risks and benefits of weight loss medications.  3.All  medications are FDA approved, however, some may be used ''off label'' for their weight loss benefits and some ''short term'' medications can be used for longer periods to achieve desired clinical outcomes.  4.All patients taking weight loss medications must be seen in clinic for refill authorization.    Counseling exercise ( V65.41)  Dietary counseling( V65.3) - 2478-3580 calories per day.  Using online program called NOOM (additional fee for online coaching)  Calculated BMI above the upper parameter and f/u plan was documented in the medical record.  Discussed indications, risks and benefits of all medications prescribed, and answered questions to patient's satisfaction.    Lost 21 lbs since 5/2018, 382 --> 361 lbs with diet and exercise efforts.  Lost 15 lbs since last seen 4/2019, 361 --> 346 lbs.  Continue diet and exercise efforts  Obtain CMP and TSH in January with annual labs.    3.  Prediabetes/IFG.  Currently treated with Metformin 1000 mg bid.  Tolerating Metformin well. Obtain fasting glucose and A1c in January.      Labs ordered today:   Orders Placed This Encounter   Procedures     Hemoglobin A1c     Comprehensive metabolic panel     **TSH with free T4 reflex FUTURE anytime     Radiology/Consults ordered today: None    More than 50% of the time spent with Ms. Cantu on counseling / coordinating her care discussing the above plan of care.The patient indicates understanding of the above issues and agrees with the plan set forth.  Total face to face time was 20 minutes.      Follow-up:  1 year, prn sooner if needed.    Darya Fuentes NP  Endocrinology  [facility]  CC: Page Gregory    ____________________________________________________________

## 2019-10-31 NOTE — LETTER
10/31/2019         RE: Zenobia Cantu  [address] [address]        Dear Colleague,    Thank you for referring your patient, Zenobia Cantu, to the [facility]. Please see a copy of my visit note below.    Name: Zenobia Cantu  F/u for obesity (Last seen 4/218/2019).  HPI:  Zenobia Cantu is a 29 year old female who presents for the managment of obestiy  Has tried many different things for weight loss - over the counter weight loss supplements, weight watchers online, food diary/calorie tracking.  History of eating disorder. Binge eating disorder.  Currently not in an eating disorder program.      Prediabetes, currently treated with Metformin 1000 mg bid.  Trial of Ozempic caused GI side effects.  Sleep Apnea.  On Cpap  Hypertension or CAD: No CAD, + HTN, currently treated with Amlodipine 10 mg every day, HCTZ 25 mg every day, Benicar 20 mg bid, and spironolactone 12.5 mg daily.    Vital Signs 9/10/2018 9/17/2018 1/3/2019   Systolic 134 134 128   Diastolic 74 82 66     Diet: portion control  Exercise:Yes: 5 days per week, cardio and weights, [employer], working out up to 1.5 hours per day.  Did not tolerate phentermine due to severe insomnia  Contrave not covered by insurance. Did not tolerate wellbutrin and naltrexone when prescribed separately due to ineffectiveness.  Prefers to just keep working on weight loss without medication.  PMH/PSH:  Past Medical History:   Diagnosis Date     Depressive disorder 2010     Essential hypertension 8/28/2017     Past Surgical History:   Procedure Laterality Date     INSERT INTRAUTERINE DEVICE  09/10/2018    mirena - remove 9/10/2023     Family Hx:  Family History   Problem Relation Age of Onset     Hypertension Father         He lives a healthy lifestyle     Depression Father      Anxiety Disorder Father      Hypertension Paternal Grandmother      Hypertension Other         Believe my grandmother's mom had problems too     Diabetes Maternal  Grandmother      Anxiety Disorder Sister      No Known Problems Mother      No Known Problems Maternal Grandfather      No Known Problems Paternal Grandfather      No Known Problems Brother      Thyroid disease: No         DM2: Yes: MGM         Autoimmune: DM1, SLE, RA, Vitiligo No    Social Hx:  Social History     Socioeconomic History     Marital status:      Spouse name: Ish     Number of children: 0     Years of education: Not on file     Highest education level: Not on file   Occupational History     Occupation: operations     Comment: school for medical coding   Social Needs     Financial resource strain: Not on file     Food insecurity:     Worry: Not on file     Inability: Not on file     Transportation needs:     Medical: Not on file     Non-medical: Not on file   Tobacco Use     Smoking status: Never Smoker     Smokeless tobacco: Never Used   Substance and Sexual Activity     Alcohol use: Yes     Comment: rarely     Drug use: No     Sexual activity: Yes     Partners: Male     Birth control/protection: Condom, I.U.D.   Lifestyle     Physical activity:     Days per week: Not on file     Minutes per session: Not on file     Stress: Not on file   Relationships     Social connections:     Talks on phone: Not on file     Gets together: Not on file     Attends Pentecostal service: Not on file     Active member of club or organization: Not on file     Attends meetings of clubs or organizations: Not on file     Relationship status: Not on file     Intimate partner violence:     Fear of current or ex partner: Not on file     Emotionally abused: Not on file     Physically abused: Not on file     Forced sexual activity: Not on file   Other Topics Concern     Parent/sibling w/ CABG, MI or angioplasty before 65F 55M? No   Social History Narrative     Not on file          MEDICATIONS:  has a current medication list which includes the following prescription(s): metformin, amlodipine, vitamin d, fluoxetine,  fluoxetine, fluticasone, hydrochlorothiazide, hyoscyamine, levonorgestrel, olmesartan, and spironolactone.    ROS   ROS: 10 point ROS neg other than the symptoms noted above in the HPI.    Physical Exam   VS: /69 (BP Location: Right arm, Patient Position: Chair, Cuff Size: Adult Large)   Pulse 100   Temp 98.2  F (36.8  C) (Oral)   Resp 16   Wt (!) 156.9 kg (346 lb)   SpO2 97%   Breastfeeding? No   BMI 45.65 kg/m     GENERAL: AXOX3, NAD, well dressed, answering questions appropriately, appears stated age.  HEENT: no exopthalmous, no proptosis, no lig lag, no retraction  CV: RRR  LUNGS: CTAB  EXTREMITIES: no edema  NEUROLOGY: CN grossly intact, no tremors  MSK: grossly intac    LABS:  !COMPREHENSIVE Latest Ref Rng & Units 4/18/2019   SODIUM 133 - 144 mmol/L 136   POTASSIUM 3.4 - 5.3 mmol/L 3.9   CHLORIDE 94 - 109 mmol/L 103   BUN 7 - 30 mg/dL 11   Creatinine 0.52 - 1.04 mg/dL 0.84   Glucose 70 - 99 mg/dL 128 (H)   ANION GAP 3 - 14 mmol/L 8   CALCIUM 8.5 - 10.1 mg/dL 9.4     Component      Latest Ref Rng & Units 8/12/2017 6/18/2018   Hemoglobin A1C      0 - 5.6 % 5.7 5.9 (H)     Component    Latest Ref Rng & Units 8/12/2017   Glucose    70 - 99 mg/dL 103 (H)   Insulin   3 - 25 mU/L 21.7   C-Peptide    0.9 - 6.9 ng/mL 3.3   Hemoglobin A1C    4.3 - 6.0 % 5.7     !LIPID/HEPATIC Latest Ref Rng & Units 8/12/2017   CHOLESTEROL <200 mg/dL 128   TRIGLYCERIDES <150 mg/dL 105   HDL CHOLESTEROL >49 mg/dL 42 (L)   LDL CHOLESTEROL, CALCULATED <100 mg/dL 65   NON HDL CHOLESTEROL <130 mg/dL 86     !THYROID Latest Ref Rng & Units 7/25/2017   TSH 0.40 - 4.00 mU/L 1.90     Component    Latest Ref Rng & Units 7/25/2017   WBC    4.0 - 11.0 10e9/L 5.8   RBC Count    3.8 - 5.2 10e12/L 4.37   Hemoglobin    11.7 - 15.7 g/dL 13.3   Hematocrit    35.0 - 47.0 % 40.3     Vital Signs 4/18/2019   Weight (LB) 361 lb 14.4 oz   Height    BMI (Calculated) 47.75     Vital Signs 10/31/2019   Weight (LB) 346 lb   Height    BMI (Calculated)  "45.65     Waist Circumference: 63\" (8/2/2017).  62\" (11/6/2017).  62\" (3/2018).  61.75\" (4/2019) 60\" (today)  All pertinent notes, labs, and images personally reviewed by me.     A/P  Ms.Amanda Cantu is a 29 year old here for the management of obestiy:    1. Morbid Obesity-  BMI 47-48 with comorbidities - HTN, prediabetes, KENY.    Obesity is associated with a significant increase in mortality and risk of many disorders, including diabetes mellitus, hypertension, dyslipidemia, heart disease, stroke, sleep apnea, cancer, and many others. Conversely, weight loss is associated with a reduction in obesity-associated morbidity.    Endocrine evaluation of obesity includes Diabetes/prediabetes, Cushing's and thyroid dysfunction.  The relevant work up for the diagnosis and management of obesity and various treatment options were discussed. Body Mass Index (BMI) has been a standard means for calculating risk for overweight and obesity. The new American Association of Clinical Endocrinology (AACE) algorithm recommends lifestyle modifications as the initial phase of treatment for all patients with the BMI equal or greater than 25 kg/m2. Lifestyle modifications includes use of medical nutrition therapy, exercise, tobacco cessation, adequate quality and quantity of sleep, limited consumption of alcohol and reduced stress through implementation of a structured, multidisciplinary program.    In patients with complications associated with obesity, graded interventions are recommended including pharmacological therapy such as phentermine, orlistat, lorcaserin and phentermine/topiramate ER, contrave ( buproprion/naltreone) and the use of very low calorie meal replacement programs.    If medical intervention is insufficient, surgical therapy may be considered, especially in patients with BMI greater than or equal to 35 kg/m2 with multiple complications. Surgical treatments include lap-band, gastric sleeve or gastric bypass " surgery.    I informed the pt that:  1.Weight loss medications can be taken to assist with weight reduction when combined with appropriate healthy lifestyle changes.  2.I discussed possible s/e, risks and benefits of weight loss medications.  3.All medications are FDA approved, however, some may be used ''off label'' for their weight loss benefits and some ''short term'' medications can be used for longer periods to achieve desired clinical outcomes.  4.All patients taking weight loss medications must be seen in clinic for refill authorization.    Counseling exercise ( V65.41)  Dietary counseling( V65.3) - 7769-2095 calories per day.  Using online program called NOOM (additional fee for online coaching)  Calculated BMI above the upper parameter and f/u plan was documented in the medical record.  Discussed indications, risks and benefits of all medications prescribed, and answered questions to patient's satisfaction.    Lost 21 lbs since 5/2018, 382 --> 361 lbs with diet and exercise efforts.  Lost 15 lbs since last seen 4/2019, 361 --> 346 lbs.  Continue diet and exercise efforts  Obtain CMP and TSH in January with annual labs.    3.  Prediabetes/IFG.  Currently treated with Metformin 1000 mg bid.  Tolerating Metformin well. Obtain fasting glucose and A1c in January.      Labs ordered today:   Orders Placed This Encounter   Procedures     Hemoglobin A1c     Comprehensive metabolic panel     **TSH with free T4 reflex FUTURE anytime     Radiology/Consults ordered today: None    More than 50% of the time spent with Ms. Cantu on counseling / coordinating her care discussing the above plan of care.The patient indicates understanding of the above issues and agrees with the plan set forth.  Total face to face time was 20 minutes.      Follow-up:  1 year, prn sooner if needed.    Darya Fuentes NP  Endocrinology  [facility]  CC: Page Gregory  [place]    ____________________________________________________________      Again, thank you for allowing me to participate in the care of your patient.        Sincerely,        LENORA Javier CNP

## 2019-11-04 DIAGNOSIS — R09.81 SINUS CONGESTION: ICD-10-CM

## 2019-11-04 NOTE — TELEPHONE ENCOUNTER
"Requested Prescriptions   Pending Prescriptions Disp Refills     fluticasone (FLONASE) 50 MCG/ACT nasal spray [Pharmacy Med Name: FLUTICASONE 50MCG NASAL SP (120) RX] 16 mL 0     Sig: SHAKE LIQUID AND USE 1 TO 2 SPRAYS IN EACH NOSTRIL DAILY  Last Written Prescription Date:  10/2/19  Last Fill Quantity: 16 mL,  # refills: 0   Last office visit: 9/13/2019 with prescribing provider:  Bri   Future Office Visit:   Next 5 appointments (look out 90 days)    Jan 14, 2020  3:10 PM CST  PHYSICAL with Page Gregory PA-C  [facility] [address] [place] [id number] [phone]             Inhaled Steroids Protocol Passed - 11/4/2019  7:54 AM        Passed - Patient is age 12 or older        Passed - Recent (12 mo) or future (30 days) visit within the authorizing provider's specialty     Patient has had an office visit with the authorizing provider or a provider within the authorizing providers department within the previous 12 mos or has a future within next 30 days. See \"Patient Info\" tab in inbasket, or \"Choose Columns\" in Meds & Orders section of the refill encounter.              Passed - Medication is active on med list           "

## 2019-11-05 RX ORDER — FLUTICASONE PROPIONATE 50 MCG
SPRAY, SUSPENSION (ML) NASAL
Qty: 16 ML | Refills: 1 | Status: SHIPPED | OUTPATIENT
Start: 2019-11-05

## 2019-11-05 NOTE — TELEPHONE ENCOUNTER
Prescription approved per [facility] Refill Protocol.  Lucretia Segura RN

## 2019-12-10 ENCOUNTER — TELEPHONE (OUTPATIENT)
Dept: FAMILY MEDICINE | Facility: CLINIC | Age: 29
End: 2019-12-10

## 2019-12-10 ENCOUNTER — MYC MEDICAL ADVICE (OUTPATIENT)
Dept: FAMILY MEDICINE | Facility: CLINIC | Age: 29
End: 2019-12-10

## 2019-12-10 NOTE — TELEPHONE ENCOUNTER
Prior Authorization Retail Medication Request    Medication/Dose: Olmesartan Medoxomil 20 mg BID  ICD code (if different than what is on RX):    Previously Tried and Failed:  Currently also on norvasc, hydrochlorothiazide, and spironolactone  Has previously been on propranolol, contrave  Rationale:  Essential hypertension [I10]    Insurance Name:  [employer]  Insurance ID:  [id number]      Pharmacy Information (if different than what is on RX)  Name:  [id number]  Phone:  [phone]  Fax: [phone]    Margarito Baez CMA ([facility])

## 2019-12-11 NOTE — TELEPHONE ENCOUNTER
Central Prior Authorization Team   Phone: [phone]    PA Initiation    Medication: Olmesartan Medoxomil 20 mg BID  Insurance Company: [employer] - Phone [phone] Fax [phone]  Pharmacy Filling the Rx: [id number] [place], [place] - [address] [place] [address] AT [facility] [address]  Filling Pharmacy Phone: [phone]  Filling Pharmacy Fax: [phone]  Start Date: 12/11/2019

## 2019-12-13 NOTE — TELEPHONE ENCOUNTER
PA not needed.  Insurance [facility] states pharmacy will need to call insurance pharmacy help desk to get an override.  Pharmacy notified and will notify patient when processed.

## 2019-12-13 NOTE — TELEPHONE ENCOUNTER
Resubmitted to current insurance.  Consuelo has termed.    Central Prior Authorization Team   Phone: [phone]    PA Initiation    Medication: Olmesartan Medoxomil 20 mg BID  Insurance Company: [employer] - Phone [phone] Fax [phone]  Pharmacy Filling the Rx: [facility] #[id number] [address] AT [facility] [address]  Filling Pharmacy Phone: [phone]  Filling Pharmacy Fax: [phone]  Start Date: 12/11/2019

## 2019-12-26 ENCOUNTER — MYC MEDICAL ADVICE (OUTPATIENT)
Dept: FAMILY MEDICINE | Facility: CLINIC | Age: 29
End: 2019-12-26

## 2019-12-26 NOTE — TELEPHONE ENCOUNTER
Called pharmacy. They have medication ready for patient and will let her know.    Urmila MCNEIL, Triage RN

## 2020-03-03 ENCOUNTER — MYC MEDICAL ADVICE (OUTPATIENT)
Dept: FAMILY MEDICINE | Facility: CLINIC | Age: 30
End: 2020-03-03

## 2020-03-03 NOTE — LETTER
March 4, 2020      Zenobia Cantu  [address] [address]        To Whom It May Concern,      Please allow patient to travel through security at airport with CPAP machine and supplies.          Sincerely,        Page Gregory PA-C

## 2020-03-04 NOTE — TELEPHONE ENCOUNTER
Sent message asking about whom for/ what the letter is needed for.  Margarito Baez CMA ([facility])

## 2020-03-10 ENCOUNTER — HEALTH MAINTENANCE LETTER (OUTPATIENT)
Age: 30
End: 2020-03-10

## 2020-03-16 ENCOUNTER — MYC REFILL (OUTPATIENT)
Dept: FAMILY MEDICINE | Facility: CLINIC | Age: 30
End: 2020-03-16

## 2020-03-16 ENCOUNTER — MYC MEDICAL ADVICE (OUTPATIENT)
Dept: FAMILY MEDICINE | Facility: CLINIC | Age: 30
End: 2020-03-16

## 2020-03-16 DIAGNOSIS — F33.0 MAJOR DEPRESSIVE DISORDER, RECURRENT EPISODE, MILD (H): ICD-10-CM

## 2020-03-16 DIAGNOSIS — E66.01 MORBID OBESITY (H): ICD-10-CM

## 2020-03-16 DIAGNOSIS — I10 ESSENTIAL HYPERTENSION: ICD-10-CM

## 2020-03-16 DIAGNOSIS — R73.01 IFG (IMPAIRED FASTING GLUCOSE): ICD-10-CM

## 2020-03-16 LAB — HBA1C MFR BLD: 5.6 % (ref 0–5.6)

## 2020-03-16 PROCEDURE — 83036 HEMOGLOBIN GLYCOSYLATED A1C: CPT | Performed by: CLINICAL NURSE SPECIALIST

## 2020-03-16 PROCEDURE — 84443 ASSAY THYROID STIM HORMONE: CPT | Performed by: CLINICAL NURSE SPECIALIST

## 2020-03-16 PROCEDURE — 36415 COLL VENOUS BLD VENIPUNCTURE: CPT | Performed by: CLINICAL NURSE SPECIALIST

## 2020-03-16 PROCEDURE — 80053 COMPREHEN METABOLIC PANEL: CPT | Performed by: CLINICAL NURSE SPECIALIST

## 2020-03-16 NOTE — TELEPHONE ENCOUNTER
Called the pt to discuss.  In the morning, her urine will smell weird and then when she drinks a lot, it will go away.  She denies frequency, urgency, burning with urination or fever.  It is a little irritated.  No back pain that is not usual for her.  This has been going on and off for a couple of weeks.      Advised to do an e-visit.      She said she is going to monitor the symptoms for now and see how her labs come out.

## 2020-03-17 ENCOUNTER — MYC MEDICAL ADVICE (OUTPATIENT)
Dept: ENDOCRINOLOGY | Facility: CLINIC | Age: 30
End: 2020-03-17

## 2020-03-17 LAB
ALBUMIN SERPL-MCNC: 4.2 G/DL (ref 3.4–5)
ALP SERPL-CCNC: 57 U/L (ref 40–150)
ALT SERPL W P-5'-P-CCNC: 72 U/L (ref 0–50)
ANION GAP SERPL CALCULATED.3IONS-SCNC: 6 MMOL/L (ref 3–14)
AST SERPL W P-5'-P-CCNC: 26 U/L (ref 0–45)
BILIRUB SERPL-MCNC: 0.7 MG/DL (ref 0.2–1.3)
BUN SERPL-MCNC: 13 MG/DL (ref 7–30)
CALCIUM SERPL-MCNC: 9.5 MG/DL (ref 8.5–10.1)
CHLORIDE SERPL-SCNC: 102 MMOL/L (ref 94–109)
CO2 SERPL-SCNC: 26 MMOL/L (ref 20–32)
CREAT SERPL-MCNC: 0.76 MG/DL (ref 0.52–1.04)
GFR SERPL CREATININE-BSD FRML MDRD: >90 ML/MIN/{1.73_M2}
GLUCOSE SERPL-MCNC: 93 MG/DL (ref 70–99)
POTASSIUM SERPL-SCNC: 4.5 MMOL/L (ref 3.4–5.3)
PROT SERPL-MCNC: 8.3 G/DL (ref 6.8–8.8)
SODIUM SERPL-SCNC: 134 MMOL/L (ref 133–144)
TSH SERPL DL<=0.005 MIU/L-ACNC: 2.26 MU/L (ref 0.4–4)

## 2020-03-17 RX ORDER — OLMESARTAN MEDOXOMIL 20 MG/1
20 TABLET ORAL
Qty: 180 TABLET | Refills: 1 | Status: SHIPPED | OUTPATIENT
Start: 2020-03-17 | End: 2020-10-27

## 2020-03-17 RX ORDER — AMLODIPINE BESYLATE 10 MG/1
10 TABLET ORAL DAILY
Qty: 90 TABLET | Refills: 1 | Status: SHIPPED | OUTPATIENT
Start: 2020-03-17 | End: 2020-10-27

## 2020-03-17 RX ORDER — FLUOXETINE 40 MG/1
CAPSULE ORAL
Qty: 90 CAPSULE | Refills: 1 | Status: SHIPPED | OUTPATIENT
Start: 2020-03-17 | End: 2020-10-27

## 2020-03-17 RX ORDER — HYDROCHLOROTHIAZIDE 25 MG/1
25 TABLET ORAL DAILY
Qty: 90 TABLET | Refills: 1 | Status: SHIPPED | OUTPATIENT
Start: 2020-03-17 | End: 2020-10-27

## 2020-03-17 RX ORDER — SPIRONOLACTONE 25 MG/1
12.5 TABLET ORAL DAILY
Qty: 90 TABLET | Refills: 0 | Status: SHIPPED | OUTPATIENT
Start: 2020-03-17 | End: 2020-10-27

## 2020-03-17 NOTE — RESULT ENCOUNTER NOTE
Zenobia,  Your A1c has improved! The previous A1c was elevated at 5.9% and has now decreased to 5.6%, which is in normal range.  Here's a copy of the result for your records.  Please let me know if you have questions.  Darya Fuentes NP  Endocrinology

## 2020-03-18 ENCOUNTER — MYC MEDICAL ADVICE (OUTPATIENT)
Dept: ENDOCRINOLOGY | Facility: CLINIC | Age: 30
End: 2020-03-18

## 2020-03-18 NOTE — TELEPHONE ENCOUNTER
Per collective [employer] messages patient sent:    Hi Darya!   Great news about my lab results. I do have one question about the alt level. What could be causes of slightly elevated, but stable alt levels? Is it all the medicine i am taking?  I should have included this in my other message but what can I do to get my alt level lower?  I also took my metformin and other medications. I was still supposed to take all meds correct? 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      This should be the last thought.:)    Patient did read lab result notes from 3/16/20. Please advise,     Thank you

## 2020-03-18 NOTE — RESULT ENCOUNTER NOTE
Zenobia,  Your thyroid test was in normal range.  Other lab results were normal other than one of the liver enzymes, ALT, which was slightly elevated.  This is not new, it was slightly elevated before and is stable.  We'll continue to monitor this.  Please let me know if you have any questions.  Darya Fuentes NP  Endocrinology

## 2020-04-06 ENCOUNTER — MYC MEDICAL ADVICE (OUTPATIENT)
Dept: SLEEP MEDICINE | Facility: CLINIC | Age: 30
End: 2020-04-06

## 2020-04-06 ENCOUNTER — MYC MEDICAL ADVICE (OUTPATIENT)
Dept: ENDOCRINOLOGY | Facility: CLINIC | Age: 30
End: 2020-04-06

## 2020-04-06 NOTE — TELEPHONE ENCOUNTER
Placed copy of letter within mychart note, and mailed a copy of letter to Zenobia address on file.

## 2020-04-15 ENCOUNTER — MYC MEDICAL ADVICE (OUTPATIENT)
Dept: ENDOCRINOLOGY | Facility: CLINIC | Age: 30
End: 2020-04-15

## 2020-04-15 NOTE — TELEPHONE ENCOUNTER
Spoke with patient.  Changed and updated visit to a video visit for 4/16/2020.  Niru Blue CMA on 4/15/2020 at 1:18 PM

## 2020-04-16 ENCOUNTER — VIRTUAL VISIT (OUTPATIENT)
Dept: ENDOCRINOLOGY | Facility: CLINIC | Age: 30
End: 2020-04-16
Payer: COMMERCIAL

## 2020-04-16 VITALS — WEIGHT: 252 LBS | BODY MASS INDEX: 33.25 KG/M2

## 2020-04-16 DIAGNOSIS — R73.03 PREDIABETES: ICD-10-CM

## 2020-04-16 DIAGNOSIS — E66.01 MORBID OBESITY (H): Primary | ICD-10-CM

## 2020-04-16 DIAGNOSIS — R73.01 IFG (IMPAIRED FASTING GLUCOSE): ICD-10-CM

## 2020-04-16 PROCEDURE — 99213 OFFICE O/P EST LOW 20 MIN: CPT | Mod: 95 | Performed by: CLINICAL NURSE SPECIALIST

## 2020-04-16 RX ORDER — METFORMIN HCL 500 MG
TABLET, EXTENDED RELEASE 24 HR ORAL
Qty: 360 TABLET | Refills: 3 | Status: SHIPPED | OUTPATIENT
Start: 2020-04-16 | End: 2020-04-16

## 2020-04-16 RX ORDER — METFORMIN HCL 500 MG
TABLET, EXTENDED RELEASE 24 HR ORAL
Qty: 360 TABLET | Refills: 3 | Status: SHIPPED | OUTPATIENT
Start: 2020-04-16

## 2020-04-16 NOTE — NURSING NOTE
RN received message metformin Rx did not go through. E-Prescribing Status: Transmission to pharmacy failed (4/16/2020  3:24 PM CDT)   Rx resent.   Aguilar Chaudhary RN

## 2020-04-16 NOTE — PROGRESS NOTES
"Zenobia Cantu is a 30 year old female who is being evaluated via a billable video visit due to COVID-19 panemic.      The patient has been notified of following:     \"This video visit will be conducted via a call between you and your physician/provider. We have found that certain health care needs can be provided without the need for an in-person physical exam.  This service lets us provide the care you need with a video conversation.  If a prescription is necessary we can send it directly to your pharmacy.  If lab work is needed we can place an order for that and you can then stop by our lab to have the test done at a later time.    Video visits are billed at different rates depending on your insurance coverage.  Please reach out to your insurance provider with any questions.    If during the course of the call the physician/provider feels a video visit is not appropriate, you will not be charged for this service.\"    Patient has given verbal consent for Video visit? Yes    How would you like to obtain your AVS? Ariehar    Patient would like the video invitation sent by: Text to cell phone: [phone]      Video Start Time: 3:05 pm    Additional provider notes: see below      Video-Visit Details    Type of service:  Video Visit    Video End Time (time video stopped): 3:27 pm    Originating Location (pt. Location): Home    Distant Location (provider location):  [facility]     Mode of Communication:  Video Conference via PopJam      Name: Zenobia Cantu  F/u for obesity (Last seen 10/31/2019).  HPI:  Zenobia Cantu is a 30 year old female being evaluated via video visit due to COVID-19 pandemic for the managment of obestiy  Has tried many different things for weight loss - over the counter weight loss supplements, weight watchers online, food diary/calorie tracking.  History of binge eating disorder.       Prediabetes, currently treated with Metformin 1000 mg bid.  Trial of Ozempic in 2018 was " well tolerated at the 0.25 and 0.5 mg weekly dose but then caused GI side effects when dose was increased to 1.0 mg weekly 8/2018.  It was discontinued shortly after that.  Ozempic did successfully control her appetite and she was able to lose weight while taking it.  Sleep Apnea.  On Cpap  Hypertension or CAD: No CAD, + HTN, currently treated with Amlodipine 10 mg every day, HCTZ 25 mg every day, Benicar 20 mg bid, and spironolactone 12.5 mg daily.    Vital Signs 9/10/2018 9/17/2018 1/3/2019   Systolic 134 134 128   Diastolic 74 82 66     Diet: portion control  Exercise:Yes: 5 days per week, cardio and weights, [employer], working out up to 1.5 hours per day.  Previously did not tolerate phentermine due to severe insomnia  Contrave not covered by insurance. Did not tolerate wellbutrin and naltrexone when prescribed separately due to ineffectiveness.   Diet + exercise for > 6 months, ineffective, has been gaining weight despite diet and exercise efforts.  Has gained 6 lbs since last seen.  PMH/PSH:  Past Medical History:   Diagnosis Date     Depressive disorder 2010     Essential hypertension 8/28/2017     Past Surgical History:   Procedure Laterality Date     INSERT INTRAUTERINE DEVICE  09/10/2018    mirena - remove 9/10/2023     Family Hx:  Family History   Problem Relation Age of Onset     Hypertension Father         He lives a healthy lifestyle     Depression Father      Anxiety Disorder Father      Hypertension Paternal Grandmother      Hypertension Other         Believe my grandmother's mom had problems too     Diabetes Maternal Grandmother      Anxiety Disorder Sister      No Known Problems Mother      No Known Problems Maternal Grandfather      No Known Problems Paternal Grandfather      No Known Problems Brother      Thyroid disease: No         DM2: Yes: MGM         Autoimmune: DM1, SLE, RA, Vitiligo No    Social Hx:  Social History     Socioeconomic History     Marital status:      Spouse name:  Ish     Number of children: 0     Years of education: Not on file     Highest education level: Not on file   Occupational History     Occupation: operations     Comment: school for medical coding   Social Needs     Financial resource strain: Not on file     Food insecurity     Worry: Not on file     Inability: Not on file     Transportation needs     Medical: Not on file     Non-medical: Not on file   Tobacco Use     Smoking status: Never Smoker     Smokeless tobacco: Never Used   Substance and Sexual Activity     Alcohol use: Yes     Comment: rarely     Drug use: No     Sexual activity: Yes     Partners: Male     Birth control/protection: Condom, I.U.D.   Lifestyle     Physical activity     Days per week: Not on file     Minutes per session: Not on file     Stress: Not on file   Relationships     Social connections     Talks on phone: Not on file     Gets together: Not on file     Attends Islam service: Not on file     Active member of club or organization: Not on file     Attends meetings of clubs or organizations: Not on file     Relationship status: Not on file     Intimate partner violence     Fear of current or ex partner: Not on file     Emotionally abused: Not on file     Physically abused: Not on file     Forced sexual activity: Not on file   Other Topics Concern     Parent/sibling w/ CABG, MI or angioplasty before 65F 55M? No   Social History Narrative     Not on file          MEDICATIONS:  has a current medication list which includes the following prescription(s): amlodipine, vitamin d, fluoxetine, fluticasone, hydrochlorothiazide, hyoscyamine, liraglutide - weight management, metformin, olmesartan, spironolactone, and levonorgestrel.    ROS   ROS: 10 point ROS neg other than the symptoms noted above in the HPI.    Physical Exam   VS: Wt 114.3 kg (252 lb)   BMI 33.25 kg/m    GENERAL: AXOX3, NAD, well dressed, answering questions appropriately, appears stated age.  HEENT: no exopthalmous, no  "proptosis, no lig lag, no retraction  CV: RRR  LUNGS: CTAB  EXTREMITIES: no edema  NEUROLOGY: CN grossly intact, no tremors  MSK: grossly intac    LABS:  !COMPREHENSIVE Latest Ref Rng & Units 3/16/2020   SODIUM 133 - 144 mmol/L 134   POTASSIUM 3.4 - 5.3 mmol/L 4.5   CHLORIDE 94 - 109 mmol/L 102   BUN 7 - 30 mg/dL 13   Creatinine 0.52 - 1.04 mg/dL 0.76   Glucose 70 - 99 mg/dL 93   ANION GAP 3 - 14 mmol/L 6   CALCIUM 8.5 - 10.1 mg/dL 9.5   ALBUMIN 3.4 - 5.0 g/dL 4.2     Component      Latest Ref Rng & Units 6/18/2018 4/18/2019 3/16/2020   Hemoglobin A1C      0 - 5.6 % 5.9 (H) 5.6 5.6     Component    Latest Ref Rng & Units 8/12/2017   Glucose    70 - 99 mg/dL 103 (H)   Insulin   3 - 25 mU/L 21.7   C-Peptide    0.9 - 6.9 ng/mL 3.3   Hemoglobin A1C    4.3 - 6.0 % 5.7     !LIPID/HEPATIC Latest Ref Rng & Units 4/18/2019 3/16/2020   AST 0 - 45 U/L 25 26   ALT 0 - 50 U/L 60 (H) 72 (H)     !THYROID Latest Ref Rng & Units 3/16/2020   TSH 0.40 - 4.00 mU/L 2.26     Component    Latest Ref Rng & Units 7/25/2017   WBC    4.0 - 11.0 10e9/L 5.8   RBC Count    3.8 - 5.2 10e12/L 4.37   Hemoglobin    11.7 - 15.7 g/dL 13.3   Hematocrit    35.0 - 47.0 % 40.3     Vital Signs 4/18/2019   Weight (LB) 361 lb 14.4 oz   Height    BMI (Calculated) 47.75     Vital Signs 10/31/2019   Weight (LB) 346 lb   Height    BMI (Calculated) 45.65     Waist Circumference: 63\" (8/2/2017).  62\" (11/6/2017).  62\" (3/2018).  61.75\" (4/2019).  60\" (10/2019)    All pertinent notes, labs, and images personally reviewed by me.     A/P    1. Morbid Obesity-  BMI 45-49.9 with comorbidities - HTN, prediabetes, KNEY.    Obesity is associated with a significant increase in mortality and risk of many disorders, including diabetes mellitus, hypertension, dyslipidemia, heart disease, stroke, sleep apnea, cancer, and many others. Conversely, weight loss is associated with a reduction in obesity-associated morbidity.    Endocrine evaluation of obesity includes " Diabetes/prediabetes, Cushing's and thyroid dysfunction.  The relevant work up for the diagnosis and management of obesity and various treatment options were discussed. Body Mass Index (BMI) has been a standard means for calculating risk for overweight and obesity. The new American Association of Clinical Endocrinology (AACE) algorithm recommends lifestyle modifications as the initial phase of treatment for all patients with the BMI equal or greater than 25 kg/m2. Lifestyle modifications includes use of medical nutrition therapy, exercise, tobacco cessation, adequate quality and quantity of sleep, limited consumption of alcohol and reduced stress through implementation of a structured, multidisciplinary program.    In patients with complications associated with obesity, graded interventions are recommended including pharmacological therapy such as phentermine, orlistat, lorcaserin and phentermine/topiramate ER, contrave ( buproprion/naltreone) and the use of very low calorie meal replacement programs.    If medical intervention is insufficient, surgical therapy may be considered, especially in patients with BMI greater than or equal to 35 kg/m2 with multiple complications. Surgical treatments include lap-band, gastric sleeve or gastric bypass surgery.    I informed the pt that:  1.Weight loss medications can be taken to assist with weight reduction when combined with appropriate healthy lifestyle changes.  2.I discussed possible s/e, risks and benefits of weight loss medications.  3.All medications are FDA approved, however, some may be used ''off label'' for their weight loss benefits and some ''short term'' medications can be used for longer periods to achieve desired clinical outcomes.  4.All patients taking weight loss medications must be seen in clinic for refill authorization.    Counseling exercise ( V65.41)  Dietary counseling( V65.3) - 4395-8463 calories per day.  Using online program called [employer]  (additional fee for online coaching)  Calculated BMI above the upper parameter and f/u plan was documented in the medical record.  Discussed indications, risks and benefits of all medications prescribed, and answered questions to patient's satisfaction.    Lost 36 lbs between 5/2018 to 10/2019, 382 --> 346 lbs with diet and exercise efforts.  Has gained weight since last seen, 346 lbs--> 352 lbs.  Diet and exercise efforts for > 6 months now ineffective.  Previously failed therapy with Phentermine due to side effects - significant insomnia.  Failed therapy with Naltrexone/Bupropion combination due to ineffectiveness.  She would be an excellent candidate for Saxenda, especially given her IFG/prediabetes.  Start Saxenda, 0.6 mg daily and titrate as directed weekly to max dose of 3.0 mg daily.  Weight loss goal of 5% in the next 3 months reviewed.  Continue diet and exercise efforts.    3.  Prediabetes/IFG.  Currently treated with Metformin 1000 mg bid.  Tolerating Metformin well. Continue current dose of Metformin.  Continue to periodically monitor fasting glucose and A1c.      Labs ordered today:   No orders of the defined types were placed in this encounter.    Radiology/Consults ordered today: None    More than 50% of the time spent with Ms. Cantu on counseling / coordinating the above plan of care.The patient indicates understanding of the above issues and agrees with the plan set forth.       Follow-up:  3 months    Darya Fuentes NP  Endocrinology  [facility]  CC: Page Gregory    ____________________________________________________________

## 2020-04-17 ENCOUNTER — TELEPHONE (OUTPATIENT)
Dept: ENDOCRINOLOGY | Facility: CLINIC | Age: 30
End: 2020-04-17

## 2020-04-17 NOTE — TELEPHONE ENCOUNTER
Prior Authorization Retail Medication Request    Medication/Dose: liraglutide - Weight Management (SAXENDA) 18 MG/3ML pen   ICD code (if different than what is on RX):    Previously Tried and Failed:  Ozempic, phentermine, wellbutrin and naltrexone  Rationale:  Pt has a BMI of 45.65, also pre-diabetic.     Insurance Name:  unknown  Insurance ID:  unknown  CoverMyMeds Key: [id number]    Pharmacy Information (if different than what is on RX)  Name:    Phone:      .

## 2020-04-20 NOTE — TELEPHONE ENCOUNTER
PA Initiation    Medication: liraglutide - Weight Management (SAXENDA) 18 MG/3ML pen   Insurance Company: [employer] - Phone [phone] Fax [phone]  Pharmacy Filling the Rx: [id number] [place], [place] - [address] [place] [address] AT [facility] [address]  Filling Pharmacy Phone: [phone]  Filling Pharmacy Fax: [phone]  Start Date: 4/20/2020

## 2020-04-21 ENCOUNTER — MYC MEDICAL ADVICE (OUTPATIENT)
Dept: ENDOCRINOLOGY | Facility: CLINIC | Age: 30
End: 2020-04-21

## 2020-04-21 DIAGNOSIS — E66.01 MORBID OBESITY (H): Primary | ICD-10-CM

## 2020-04-21 DIAGNOSIS — R73.01 IFG (IMPAIRED FASTING GLUCOSE): ICD-10-CM

## 2020-04-21 DIAGNOSIS — R73.03 PREDIABETES: ICD-10-CM

## 2020-04-21 NOTE — TELEPHONE ENCOUNTER
Prior Authorization Approval    Authorization Effective Date: 3/21/2020  Authorization Expiration Date: 8/18/2020  Medication: liraglutide - Weight Management (SAXENDA) 18 MG/3ML pen   Approved Dose/Quantity:   Reference #: [id number]   Insurance Company: [employer] - Phone [phone] Fax [phone]  Expected CoPay:       CoPay Card Available:      Foundation Assistance Needed:    Which Pharmacy is filling the prescription (Not needed for infusion/clinic administered): [id number] - [place] [address] [address] [facility] AT [facility] [address]  Pharmacy Notified: Yes  Patient Notified: Yes, **Instructed pharmacy to notify patient when script is ready to /ship.**

## 2020-04-22 NOTE — TELEPHONE ENCOUNTER
Fax from [facility], needs pen needles for Saxenda, was not sent, sent pen needles, also sent mychart response, FYI to     Prescription approved per [facility] Refill Protocol.  Sandi Abreu, RN, BSN

## 2020-07-20 ENCOUNTER — TELEPHONE (OUTPATIENT)
Dept: ENDOCRINOLOGY | Facility: CLINIC | Age: 30
End: 2020-07-20

## 2020-07-20 NOTE — TELEPHONE ENCOUNTER
PA Initiation    Medication: liraglutide - Weight Management (SAXENDA) 18 MG/3ML pen   Insurance Company: [employer] - Phone [phone] Fax [phone]  Pharmacy Filling the Rx: [id number] [place], [place] - [facility] [address] AT [facility] [address]  Filling Pharmacy Phone: [phone]  Filling Pharmacy Fax: [phone]  Start Date: 7/20/2020

## 2020-07-20 NOTE — TELEPHONE ENCOUNTER
Prior Authorization Approval    Authorization Effective Date: 6/20/2020  Authorization Expiration Date: 11/17/2020  Medication: liraglutide - Weight Management (SAXENDA) 18 MG/3ML pen   Approved Dose/Quantity:   Reference #: [id number]   Insurance Company: [employer] - Phone [phone] Fax [phone]  Expected CoPay:       CoPay Card Available:      Foundation Assistance Needed:    Which Pharmacy is filling the prescription (Not needed for infusion/clinic administered): [id number] - [place] [address] [address] [facility] AT [facility] [address]  Pharmacy Notified: Yes  Patient Notified: Yes, **Instructed pharmacy to notify patient when script is ready to /ship.**

## 2020-08-20 ENCOUNTER — TELEPHONE (OUTPATIENT)
Dept: ENDOCRINOLOGY | Facility: CLINIC | Age: 30
End: 2020-08-20

## 2020-08-20 ENCOUNTER — MYC MEDICAL ADVICE (OUTPATIENT)
Dept: ENDOCRINOLOGY | Facility: CLINIC | Age: 30
End: 2020-08-20

## 2020-08-20 NOTE — TELEPHONE ENCOUNTER
The following [employer] message sent to the patient:    Leo Fregoso,    I left you a voicemail but wanted to send you a [employer] message as well regarding your upcoming appointment scheduled in Endocrinology.  You are scheduled to see Darya Fuentes NP, next Thursday, August 27th at 5:30 p.m.  Unfortunately we are not seeing patients in clinic at this time as we are trying to minimize patient exposure and keep our patients healthy while also practicing social distancing due to COVID19.  We would be happy to do your visit by video via [employer] or Smartphone as scheduled for 8/27/2020 at 5:30 p.m.      If you log in to your [employer], you will see that [employer] video visit instructions have been sent to you.  We would be happy to do a video visit via your smartphone if you prefer and I will plan to discuss how to connect virtually when I call you to prepare for the appointment.  I will call you up to 15 minutes prior to your scheduled appointment time to confirm you are ready for the appointment and answer any questions you may have about connecting online.  If this works for you, no need to call us back.  If not, please call us to discuss alternatives for your appointment.       Thank you,  Niru Blue M.A.  [facility]  [phone] [facility]

## 2020-08-26 NOTE — TELEPHONE ENCOUNTER
"The following [employer] message sent to the patient:    Leo Fregoso,    I received your message.  Unfortunately Darya Fuentes NP is booked for the remainder of the year in Endocrinology.  I do however have a couple of options that I can discuss with you by phone.  I have a difficult time getting a hold of you by phone so I am giving you my direct phone number to my station to help you avoid lengthy \"on hold\" times when you return my call.  You can reach me at [phone].  Another option is to schedule with one of Darya Fuentes's colleagues at another office.  Endocrinology is only booking virtual appointments at this time.  Our [place] office has sooner openings but since the appointments are virtual, it sometimes works out for patients to have a phone or video visit.  The appointment number to [place] is [phone].   I didn't cancel your appointment yet because I wanted to make sure you knew how far out we are booking so you could decide if you wanted to cancel.  Let me know what you prefer.  Take care.    Thank you,  Niru Blue M.A.  Endocrinology  [facility]  [phone] [facility]    "

## 2020-09-15 ENCOUNTER — TELEPHONE (OUTPATIENT)
Dept: ENDOCRINOLOGY | Facility: CLINIC | Age: 30
End: 2020-09-15

## 2020-09-15 NOTE — TELEPHONE ENCOUNTER
General Call:     Who is calling:  Zenobia    Reason for Call:  followup Endocrinology Appointment    What are your questions or concerns:  Need appointment before January    Date of last appointment with provider: 04/16/20207150-vlvpnni-DsqvzNaveed Meier to leave a detailed message:Yes at Cell number on file:    Telephone Information:   Mobile [phone]

## 2020-09-16 NOTE — TELEPHONE ENCOUNTER
Left message for patient to return my call.  Advised Darya Fuentes is booked for the year although cancellations do come up if she would like to call and check.  Otherwise advised I could set her up for an appointment with Dr Jessica.  My number given of [phone].  Niru Blue CMA on 9/16/2020 at 11:48 AM

## 2020-09-18 NOTE — TELEPHONE ENCOUNTER
Provider - please review chart and advise on alternatives to an appointment with Darya Fuentes.  No appointments available prior to January.  Niru Blue CMA on 9/18/2020 at 9:20 AM

## 2020-09-22 ENCOUNTER — HOSPITAL ENCOUNTER (OUTPATIENT)
Dept: BEHAVIORAL HEALTH | Facility: CLINIC | Age: 30
Discharge: HOME | End: 2020-09-22
Attending: FAMILY MEDICINE | Admitting: FAMILY MEDICINE
Payer: COMMERCIAL

## 2020-09-22 ENCOUNTER — MYC MEDICAL ADVICE (OUTPATIENT)
Dept: ENDOCRINOLOGY | Facility: CLINIC | Age: 30
End: 2020-09-22

## 2020-09-22 PROCEDURE — 90791 PSYCH DIAGNOSTIC EVALUATION: CPT | Mod: GT | Performed by: PSYCHOLOGIST

## 2020-09-22 NOTE — TELEPHONE ENCOUNTER
MA unable to stay late on a Wed or Fri.  Worked patient in over the lunch hour on Friday, October 30th for a virtual visit, checking in at 11:20 a.m for a 11:30 a.m. with provider.  Have called patient several times with no return call from patient.  Sent patient a People[employer] message with appointment details as follows:      Leo Fregoso,    Darya Fuentes, NP received your message regarding wanting to reschedule your missed appointment for follow up of weight management.  Endocrinology is only doing Virtual appointments at this time.  Darya Fuentes is booked out for the remainder of the year and she is currently out of the clinic for the next several weeks on vacation.  I have booked you for a virtual appointment with her on Friday, October 30th over her lunch hour per her permission.  I will call you at about 11:20 a.m. to get you ready for the visit with her scheduled at 11:30 a.m.  I am sorry that we don't have much to offer and are not able to be flexible.  Hopefully since this is a virtual appointment, this option will work for you .      People[employer] video visit instructions will be sent to you prior to your visit.  You can log into your visit up to 30 minutes prior to your scheduled time.  I will call you around 11:20 a.m. to get you ready for the visit, update your chart and answer any questions you may have.  Darya will do your visit as a video visit through PeopleJam or if you prefer, she can also send you a text invitation to your cell phone if you have WiFi connection and do the video visit that way.   If you do not have access to WiFi, she can also do your visit over the phone.    I tried to reach you by phone without success.  If you have questions, please feel free to contact me at [phone].  If this appointment time doesn't work for you, Dr. Jessica a colleague of Darya Fuentes has openings and I would be happy to help you schedule a virtual appointment with Dr. Jessica.   We look forward to talking with you on  10/30/2020.   Take care.    Thank you,  Niru Blue M.A.  [facility]  [phone] [facility]       Niru Blue, SAMANTHA on 9/22/2020 at 11:47 AM
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
## 2020-10-01 NOTE — TELEPHONE ENCOUNTER
The following [employer] message sent to the patient:    Leo Fregoso,    I see that one of the Endocrinology medical assistants sent you Dr. Ahn scheduling information and clinic addresses.  I will leave it up to you to give the appointment line a call and set up a virtual appointment that works best for you.  I did not cancel your virtual appointment with Darya Fuentes that is currently scheduled for Friday, October 30th, checking in at 11:20 a.m.  I hesitate to cancel that without your permission so I will let you reconsider that and if you are sure you want to cancel it, please do so when you schedule your appointment with Dr. Jessica.   Take care.    Thank you,  Niru Blue M.A.  [facility]  [phone] [facility]

## 2020-10-12 ENCOUNTER — MYC MEDICAL ADVICE (OUTPATIENT)
Dept: ENDOCRINOLOGY | Facility: CLINIC | Age: 30
End: 2020-10-12

## 2020-10-27 ENCOUNTER — VIRTUAL VISIT (OUTPATIENT)
Dept: INTERNAL MEDICINE | Facility: CLINIC | Age: 30
End: 2020-10-27
Payer: COMMERCIAL

## 2020-10-27 DIAGNOSIS — E66.01 MORBID OBESITY (H): Primary | ICD-10-CM

## 2020-10-27 DIAGNOSIS — R73.03 PREDIABETES: ICD-10-CM

## 2020-10-27 DIAGNOSIS — I10 ESSENTIAL HYPERTENSION: ICD-10-CM

## 2020-10-27 PROCEDURE — 99214 OFFICE O/P EST MOD 30 MIN: CPT | Mod: 95 | Performed by: NURSE PRACTITIONER

## 2020-10-27 RX ORDER — SPIRONOLACTONE 25 MG/1
12.5 TABLET ORAL DAILY
Qty: 90 TABLET | Refills: 0 | Status: SHIPPED | OUTPATIENT
Start: 2020-10-27

## 2020-10-27 RX ORDER — AMLODIPINE BESYLATE 10 MG/1
10 TABLET ORAL DAILY
Qty: 90 TABLET | Refills: 1 | Status: SHIPPED | OUTPATIENT
Start: 2020-10-27

## 2020-10-27 RX ORDER — HYDROCHLOROTHIAZIDE 25 MG/1
25 TABLET ORAL DAILY
Qty: 90 TABLET | Refills: 1 | Status: SHIPPED | OUTPATIENT
Start: 2020-10-27

## 2020-10-27 RX ORDER — OLMESARTAN MEDOXOMIL 20 MG/1
20 TABLET ORAL
Qty: 180 TABLET | Refills: 1 | Status: SHIPPED | OUTPATIENT
Start: 2020-10-27

## 2020-10-27 NOTE — PATIENT INSTRUCTIONS
Prescriptions refills given on Spironolactone, Olmesartan, hydrochlorothiazide, and Amlodopine    Has physical and labs to be done in December with Dr. Grider (keep appt)

## 2020-10-27 NOTE — PROGRESS NOTES
"Zenobia Cantu is a 30 year old female who is being evaluated via a billable video visit.      The patient has been notified of following:     \"This video visit will be conducted via a call between you and your physician/provider. We have found that certain health care needs can be provided without the need for an in-person physical exam.  This service lets us provide the care you need with a video conversation.  If a prescription is necessary we can send it directly to your pharmacy.  If lab work is needed we can place an order for that and you can then stop by our lab to have the test done at a later time.    Video visits are billed at different rates depending on your insurance coverage.  Please reach out to your insurance provider with any questions.    If during the course of the call the physician/provider feels a video visit is not appropriate, you will not be charged for this service.\"    Patient has given verbal consent for Video visit? Yes, Diana Dai MA  How would you like to obtain your AVS? MyChart  If you are dropped from the video visit, the video invite should be resent to: Text to cell phone: [phone]  Will anyone else be joining your video visit? No      Subjective     Zenobia Cantu is a 30 year old female who presents today via video visit for the following health issues:    HPI     Hypertension Follow-up      Do you check your blood pressure regularly outside of the clinic? Yes     Are you following a low salt diet? Yes    Are your blood pressures ever more than 140 on the top number (systolic) OR more   than 90 on the bottom number (diastolic), for example 140/90? No      How many servings of fruits and vegetables do you eat daily?  4 or more    On average, how many sweetened beverages do you drink each day (Examples: soda, juice, sweet tea, etc.  Do NOT count diet or artificially sweetened beverages)?   0    How many days per week do you exercise enough to make your heart beat faster? " 5    How many minutes a day do you exercise enough to make your heart beat faster? 30 - 60    How many days per week do you miss taking your medication? 0        Video Start Time: 5:28 PM    Needing Rx refills on Hypertension; states at one time unable to get BP under control so now on 2 BP medications and 2 water pills.  Last labs 3/2020.  She was scheduled for a physical tomorrow with Dr. Grider but that was moved out to December.  Tells me that she takes Metformin for weight loss and at one time pre-diabetic.  Last A1C = 5.6    Overall doing ok. No fever or cough.  No shortness of breathe or chest pain.  No stomach or urination issues.  No pain.    Review of Systems   Constitutional, HEENT, cardiovascular, pulmonary, gi and gu systems are negative, except as otherwise noted.      Objective           Vitals:  No vitals were obtained today due to virtual visit.    Physical Exam     GENERAL:  alert and no distress  EYES: Eyes grossly normal to inspection.  No discharge or erythema, or obvious scleral/conjunctival abnormalities.  RESP: No audible wheeze, cough, or visible cyanosis.  No visible retractions or increased work of breathing.    SKIN: Visible skin clear. No significant rash, abnormal pigmentation or lesions.  NEURO: Cranial nerves grossly intact.  Mentation and speech appropriate for age.  PSYCH: Mentation appears normal, affect normal/bright, judgement and insight intact, normal speech and appearance well-groomed.              Assessment & Plan     Essential hypertension  - home BP usually around 120's/80's so Rx refills given:    spironolactone (ALDACTONE) 25 MG tablet; Take 0.5 tablets (12.5 mg) by mouth daily    olmesartan (BENICAR) 20 MG tablet; Take 1 tablet (20 mg) by mouth 2 times daily    hydrochlorothiazide (HYDRODIURIL) 25 MG tablet; Take 1 tablet (25 mg) by mouth daily    amLODIPine (NORVASC) 10 MG tablet; Take 1 tablet (10 mg) by mouth daily    Morbid obesity (H)  - on Metformin 1000 mg  bid    Prediabetes  - last A1C = 5.6 (3/2020)              Return for Routine preventive, in person; already has appt with Dr. Grider in December.    China Paulino CNP  [facility]      Video-Visit Details    Type of service:  Video Visit    Video End Time:5:37 PM    Originating Location (pt. Location): Home    Distant Location (provider location):  [facility]     Platform used for Video Visit: [email or web address]

## 2020-11-03 ENCOUNTER — OFFICE VISIT (OUTPATIENT)
Dept: INTERNAL MEDICINE | Facility: CLINIC | Age: 30
End: 2020-11-03
Payer: COMMERCIAL

## 2020-11-03 VITALS
SYSTOLIC BLOOD PRESSURE: 126 MMHG | TEMPERATURE: 98.2 F | BODY MASS INDEX: 39.68 KG/M2 | DIASTOLIC BLOOD PRESSURE: 74 MMHG | WEIGHT: 293 LBS | HEIGHT: 72 IN | HEART RATE: 91 BPM | OXYGEN SATURATION: 100 %

## 2020-11-03 DIAGNOSIS — Z11.4 ENCOUNTER FOR SCREENING FOR HIV: ICD-10-CM

## 2020-11-03 DIAGNOSIS — Z13.220 LIPID SCREENING: ICD-10-CM

## 2020-11-03 DIAGNOSIS — R73.03 PREDIABETES: ICD-10-CM

## 2020-11-03 DIAGNOSIS — I10 ESSENTIAL HYPERTENSION: ICD-10-CM

## 2020-11-03 DIAGNOSIS — Z11.59 ENCOUNTER FOR HEPATITIS C SCREENING TEST FOR LOW RISK PATIENT: ICD-10-CM

## 2020-11-03 DIAGNOSIS — Z00.00 ROUTINE GENERAL MEDICAL EXAMINATION AT A HEALTH CARE FACILITY: Primary | ICD-10-CM

## 2020-11-03 DIAGNOSIS — K58.2 IRRITABLE BOWEL SYNDROME WITH BOTH CONSTIPATION AND DIARRHEA: ICD-10-CM

## 2020-11-03 LAB — HBA1C MFR BLD: 5.8 % (ref 0–5.6)

## 2020-11-03 PROCEDURE — 83036 HEMOGLOBIN GLYCOSYLATED A1C: CPT | Performed by: PHYSICIAN ASSISTANT

## 2020-11-03 PROCEDURE — 99395 PREV VISIT EST AGE 18-39: CPT | Performed by: PHYSICIAN ASSISTANT

## 2020-11-03 PROCEDURE — 86803 HEPATITIS C AB TEST: CPT | Performed by: PHYSICIAN ASSISTANT

## 2020-11-03 PROCEDURE — 87389 HIV-1 AG W/HIV-1&-2 AB AG IA: CPT | Performed by: PHYSICIAN ASSISTANT

## 2020-11-03 PROCEDURE — 36415 COLL VENOUS BLD VENIPUNCTURE: CPT | Performed by: PHYSICIAN ASSISTANT

## 2020-11-03 PROCEDURE — 80053 COMPREHEN METABOLIC PANEL: CPT | Performed by: PHYSICIAN ASSISTANT

## 2020-11-03 PROCEDURE — 80061 LIPID PANEL: CPT | Performed by: PHYSICIAN ASSISTANT

## 2020-11-03 RX ORDER — HYOSCYAMINE SULFATE 0.125 MG
0.12 TABLET ORAL EVERY 4 HOURS PRN
Qty: 60 TABLET | Refills: 3 | Status: CANCELLED | OUTPATIENT
Start: 2020-11-03

## 2020-11-03 ASSESSMENT — MIFFLIN-ST. JEOR: SCORE: 2485.83
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
## 2020-11-04 LAB
ALBUMIN SERPL-MCNC: 4.3 G/DL (ref 3.4–5)
ALP SERPL-CCNC: 78 U/L (ref 40–150)
ALT SERPL W P-5'-P-CCNC: 42 U/L (ref 0–50)
ANION GAP SERPL CALCULATED.3IONS-SCNC: 7 MMOL/L (ref 3–14)
AST SERPL W P-5'-P-CCNC: 18 U/L (ref 0–45)
BILIRUB SERPL-MCNC: 0.7 MG/DL (ref 0.2–1.3)
BUN SERPL-MCNC: 8 MG/DL (ref 7–30)
CALCIUM SERPL-MCNC: 9.9 MG/DL (ref 8.5–10.1)
CHLORIDE SERPL-SCNC: 102 MMOL/L (ref 94–109)
CHOLEST SERPL-MCNC: 120 MG/DL
CO2 SERPL-SCNC: 27 MMOL/L (ref 20–32)
CREAT SERPL-MCNC: 0.71 MG/DL (ref 0.52–1.04)
GFR SERPL CREATININE-BSD FRML MDRD: >90 ML/MIN/{1.73_M2}
GLUCOSE SERPL-MCNC: 91 MG/DL (ref 70–99)
HCV AB SERPL QL IA: NONREACTIVE
HDLC SERPL-MCNC: 41 MG/DL
HIV 1+2 AB+HIV1 P24 AG SERPL QL IA: NONREACTIVE
LDLC SERPL CALC-MCNC: 68 MG/DL
NONHDLC SERPL-MCNC: 79 MG/DL
POTASSIUM SERPL-SCNC: 3.9 MMOL/L (ref 3.4–5.3)
PROT SERPL-MCNC: 8.1 G/DL (ref 6.8–8.8)
SODIUM SERPL-SCNC: 136 MMOL/L (ref 133–144)
TRIGL SERPL-MCNC: 57 MG/DL

## 2020-11-08 ENCOUNTER — VIRTUAL VISIT (OUTPATIENT)
Dept: FAMILY MEDICINE | Facility: OTHER | Age: 30
End: 2020-11-08
Payer: COMMERCIAL

## 2020-11-08 PROCEDURE — 99421 OL DIG E/M SVC 5-10 MIN: CPT | Performed by: NURSE PRACTITIONER

## 2020-11-09 NOTE — PROGRESS NOTES
"Date: 2020 20:08:10  Clinician: Nayla Escamilla  Clinician NPI: [id number]  Patient: Zenobia Cantu  Patient : 1990  Patient Address: [address] [place] [address]  Patient Phone: [phone]  Visit Protocol: URI  Patient Summary:  Zenobia is a 30 year old ( : 1990 ) female who initiated a [facility] Visit for COVID-19 (Coronavirus) evaluation and screening. When asked the question \"Please sign me up to receive news, health information and promotions from [facility].\", Zenobia responded \"No\".    Zenobia states her symptoms started gradually 5-6 days ago.   Her symptoms consist of rhinitis, myalgia, malaise, ageusia, ear pain, and nasal congestion.   Symptom details   Nasal secretions: The color of her mucus is white and clear.   Zenobia denies having vomiting, facial pain or pressure, chills, sore throat, teeth pain, diarrhea, headache, wheezing, fever, cough, nausea, and anosmia. She also denies taking antibiotic medication in the past month, having recent facial or sinus surgery in the past 60 days, and double sickening (worsening symptoms after initial improvement). She is not experiencing dyspnea.   Precipitating events  She has not recently been exposed to someone with influenza. Zenobia has not been in close contact with any high risk individuals.   Pertinent COVID-19 (Coronavirus) information  Zenobia does not work or volunteer as healthcare worker or a . In the past 14 days, Zenobia has not worked or volunteered at a healthcare facility or group living setting.   In the past 14 days, she also has not lived in a congregate living setting.   Zenobia has not had a close contact with a laboratory-confirmed COVID-19 patient within 14 days of symptom onset.    Since 2019, Zenobia has not been tested for COVID-19 and has not had upper respiratory infection or influenza-like illness.   Pertinent medical history  Zenobia does not get yeast infections when she takes antibiotics.   " Zenobia needs a return to work/school note.   Weight: 360 lbs   Zenobia does not smoke or use smokeless tobacco.   She denies pregnancy and denies breastfeeding. She does not menstruate.   Additional information as reported by the patient (free text): I have seasonal allergies. I typically lose my sense of taste or get a bad taste in my mouth post nasal drip. I feel like this may be the case. I have felt nasal drainage for 3-4 days. It first started about Wednesday or Thursday. It's like I can taste bits of stuff and then get the nasty nasal drip taste.   Weight: 360 lbs    MEDICATIONS: olmesartan-hydrochlorothiazide oral, amlodipine-benazepril oral, metformin oral, spironolactone-hydrochlorothiazide oral, ALLERGIES: amoxicillin, Saxenda  Clinician Response:  Dear Zenobia,   Your symptoms show that you may have coronavirus (COVID-19). This illness can cause fever, cough and trouble breathing. Many people get a mild case and get better on their own. Some people can get very sick.  What should I do?  We would like to test you for this virus.   1. Please call [phone] to schedule your visit. Explain that you were [facility] by [facility] to have a COVID-19 test. Be ready to share your [facility] visit ID number.  * If you need to schedule in [facility] please call [phone] or for [place] employees please call [phone].  * If you need to schedule in the [place] area please call [phone]. [place] employees call [phone].  The following will serve as your written order for this COVID Test, ordered by me, for the indication of suspected COVID [Z20.828]: The test will be ordered in [employer], our electronic health record, after you are scheduled. It will show as ordered and authorized by Jun Jeter MD.  Order: COVID-19 (Coronavirus) PCR for SYMPTOMATIC testing from [facility].   2. When it's time for your COVID test:  Stay at least 6 feet away from others. (If someone will drive you to your test, stay in the backseat, as  "far away from the  as you can.)   Cover your mouth and nose with a mask, tissue or washcloth.  Go straight to the testing site. Don't make any stops on the way there or back.      3.Starting now: Stay home and away from others (self-isolate) until:   You've had no fever---and no medicine that reduces fever---for one full day (24 hours). And...   Your other symptoms have gotten better. For example, your cough or breathing has improved. And...   At least 10 days have passed since your symptoms started.       During this time, don't leave the house except for testing or medical care.   Stay in your own room, even for meals. Use your own bathroom if you can.   Stay away from others in your home. No hugging, kissing or shaking hands. No visitors.  Don't go to work, school or anywhere else.    Clean \"high touch\" surfaces often (doorknobs, counters, handles, etc.). Use a household cleaning spray or wipes. You'll find a full list of  on the EPA website: www.epa.gov/pesticide-registration/list-n-disinfectants-use-against-sars-cov-2.   Cover your mouth and nose with a mask, tissue or washcloth to avoid spreading germs.  Wash your hands and face often. Use soap and water.  Caregivers in these groups are at risk for severe illness due to COVID-19:  o People 65 years and older  o People who live in a nursing home or long-term care facility  o People with chronic disease (lung, heart, cancer, diabetes, kidney, liver, immunologic)  o People who have a weakened immune system, including those who:   Are in cancer treatment  Take medicine that weakens the immune system, such as corticosteroids  Had a bone marrow or organ transplant  Have an immune deficiency  Have poorly controlled HIV or AIDS  Are obese (body mass index of 40 or higher)  Smoke regularly   o Caregivers should wear gloves while washing dishes, handling laundry and cleaning bedrooms and bathrooms.  o Use caution when washing and drying laundry: Don't " shake dirty laundry, and use the warmest water setting that you can.  o For more tips, go to www.cdc.gov/coronavirus/2019-ncov/downloads/10Things.pdf.    4.Sign up for [facility]. We know it's scary to hear that you might have COVID-19. We want to track your symptoms to make sure you're okay over the next 2 weeks. Please look for an email from [facility]---this is a free, online program that we'll use to keep in touch. To sign up, follow the link in the email. Learn more at [email or web address]  How can I take care of myself?   Get lots of rest. Drink extra fluids (unless a doctor has told you not to).   Take Tylenol (acetaminophen) for fever or pain. If you have liver or kidney problems, ask your family doctor if it's okay to take Tylenol.   Adults can take either:    650 mg (two 325 mg pills) every 4 to 6 hours, or...   1,000 mg (two 500 mg pills) every 8 hours as needed.    Note: Don't take more than 3,000 mg in one day. Acetaminophen is found in many medicines (both prescribed and over-the-counter medicines). Read all labels to be sure you don't take too much.   For children, check the Tylenol bottle for the right dose. The dose is based on the child's age or weight.    If you have other health problems (like cancer, heart failure, an organ transplant or severe kidney disease): Call your specialty clinic if you don't feel better in the next 2 days.       Know when to call 911. Emergency warning signs include:    Trouble breathing or shortness of breath Pain or pressure in the chest that doesn't go away Feeling confused like you haven't felt before, or not being able to wake up Bluish-colored lips or face.  Where can I get more information?    [facility] [employer] [place] -- About COVID-19: [email or web address]   CDC -- What to Do If You're Sick: www.cdc.gov/coronavirus/2019-ncov/about/steps-when-sick.html   CDC -- Ending Home Isolation:  www.cdc.gov/coronavirus/2019-ncov/hcp/disposition-in-home-patients.html   [facility] -- Caring for Someone: www.cdc.gov/coronavirus/2019-ncov/if-you-are-sick/care-for-someone.html   [facility] -- Interim Guidance for Hospital Discharge to Home: www.[facility].mn.us/diseases/coronavirus/hcp/hospdischarge.pdf   [facility] clinical trials (COVID-19 research studies): clinicalaffairs.[facility]-clinical-trials    Below are the COVID-19 hotlines at the [place] [facility] ([facility]). Interpreters are available.    For health questions: Call 739-695-5323 or 1-205.167.6694 (7 a.m. to 7 p.m.) For questions about schools and childcare: Call 597-262-1000 or 1-613.572.8681 (7 a.m. to 7 p.m.)    Diagnosis: Other malaise  Diagnosis ICD: R53.81

## 2020-12-01 ENCOUNTER — MYC MEDICAL ADVICE (OUTPATIENT)
Dept: INTERNAL MEDICINE | Facility: CLINIC | Age: 30
End: 2020-12-01

## 2020-12-06 ENCOUNTER — MYC MEDICAL ADVICE (OUTPATIENT)
Dept: INTERNAL MEDICINE | Facility: CLINIC | Age: 30
End: 2020-12-06

## 2020-12-27 ENCOUNTER — HEALTH MAINTENANCE LETTER (OUTPATIENT)
Age: 30
End: 2020-12-27

## 2021-10-09 ENCOUNTER — HEALTH MAINTENANCE LETTER (OUTPATIENT)
Age: 31
End: 2021-10-09

## 2021-12-04 ENCOUNTER — HEALTH MAINTENANCE LETTER (OUTPATIENT)
Age: 31
End: 2021-12-04

## 2022-03-03 NOTE — TELEPHONE ENCOUNTER
mtm notes stress can cause anxiety which can lead to SOB, will suggest miralax + bisacodyl daily for constipation.      Zenobia--YES--stress causes anxiety which can lead to short bouts of shortness of breath . Lets have you try a daily glass of Miralax --this is an over the counter powder --use 1 full capsul daily in glass of water to make you more regular.  If your severly bound up -also try a few days of 5mg. Bisacodyl (dulcolax) to get things moving then back off that and see if miralx will work by itself.    Kirby Peralta, [facility].  Medication Therapy Management Provider  [phone]    
Yes - the patient is able to be screened

## 2022-09-11 ENCOUNTER — HEALTH MAINTENANCE LETTER (OUTPATIENT)
Age: 32
End: 2022-09-11

## 2023-01-23 ENCOUNTER — HEALTH MAINTENANCE LETTER (OUTPATIENT)
Age: 33
End: 2023-01-23

## 2023-12-28 NOTE — TELEPHONE ENCOUNTER
EP Attending  HISTORY OF PRESENT ILLNESS: HPI:  Mr Marshall is a very pleasant 44yoM who presents to our office with worsening/progressive chest discomfort.  Described as heaviness that is substernal/mid-chest, nonradiating, occuring while climbing stairs at work.  He is a .  Has HTN, and abdominal obesity.  Takes Amlodipine for HTN and PPI for GERD.  Date of service 12/28- resting in bed, no angina. awaiting CCTA tomorrow.  HR in 60s.    PAST MEDICAL & SURGICAL HISTORY:  HTN  Obesity    aspirin enteric coated 81 milliGRAM(s) Oral daily  dextrose 5%. 1000 milliLiter(s) IV Continuous <Continuous>  dextrose 5%. 1000 milliLiter(s) IV Continuous <Continuous>  dextrose 50% Injectable 12.5 Gram(s) IV Push once  dextrose 50% Injectable 25 Gram(s) IV Push once  dextrose 50% Injectable 25 Gram(s) IV Push once  dextrose Oral Gel 15 Gram(s) Oral once PRN  glucagon  Injectable 1 milliGRAM(s) IntraMuscular once  influenza   Vaccine 0.5 milliLiter(s) IntraMuscular once  insulin lispro (ADMELOG) corrective regimen sliding scale   SubCutaneous three times a day before meals  insulin lispro (ADMELOG) corrective regimen sliding scale   SubCutaneous at bedtime  metoprolol tartrate 25 milliGRAM(s) Oral every 6 hours  pantoprazole    Tablet 40 milliGRAM(s) Oral before breakfast                       16.2   8.40  )-----------( 288      ( 27 Dec 2023 15:49 )             48.1       12-28    137  |  99  |  12  ----------------------------<  189<H>  3.7   |  26  |  0.74    Ca    8.8      28 Dec 2023 04:53    TPro  7.2  /  Alb  3.8  /  TBili  0.4  /  DBili  x   /  AST  63<H>  /  ALT  104<H>  /  AlkPhos  103  12-28    T(C): 37.2 (12-28-23 @ 10:35), Max: 37.2 (12-28-23 @ 10:35)  HR: 85 (12-28-23 @ 10:35) (62 - 87)  BP: 112/71 (12-28-23 @ 10:35) (112/71 - 153/105)  RR: 18 (12-28-23 @ 10:35) (16 - 18)  SpO2: 98% (12-28-23 @ 10:35) (97% - 98%)  Wt(kg): --    I&O's Summary    General: Well nourished, no acute distress, alert and oriented x 3  Head: normocephalic, no trauma  Neck: no JVD, no bruit, supple, not enlarged  CV: S1S2, no S3, regular rate, rhythm is SINUS, no murmurs.    Lungs: clear BL, no rales or wheezes  Abdomen: bowel sounds +, soft, nontender, nondistended  Extremities: no clubbing, cyanosis or edema  Neuro: Moves all 4 extremities, sensation intact x 4 extremities  Skin: warm and moist, normal turgor  Psych: Mood and affect are appropriate for circumstances  MSK: normal range of motion and strength x4 extremities.    TELEMETRY: NSR	    ECG: NSR, lateral T-waves flat. 	  Echo: NSR  NST: normal perfusion, 11.5min of exercise on Martir protocol without ischemic changes on EKG.  	    ASSESSMENT/PLAN: Mr Marshall is a very pleasant 44y male here with persistent, progressive chest pain.  Concerning for angina, which is in contrast to his reassuring office workup with normal echo, and good exercise capacity on treadmill / normal nuclear myocardial perfusion imaging.  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  Continue  metoprolol to slow heartrate down towards 60.  Holding parameter for drug set at 50bpm.  If we find any coronary calcium, will have evidence to start Statin therapy early.  Hopefully, no multivessel CAD (patient concerned about CAD due to demographics and family history).  Will follow with you.      Myles Keller M.D.  Cardiac Electrophysiology    office [phone]  pager [phone] I think we could schedule her for a quick phone visit.  You could add her on to the end of the day on a Wednesday or Friday after I return.  She is on Saxenda and her insurance gives her prior auth for 3 months at a time then documentation needs to be provided to verify she is losing sufficient weight before they'll approve it again. Her current PA expires 11/17/2020 so it just needs to be before the expiration.     Will that work?    Darya Fuentes NP  Endocrinology     EP Attending  HISTORY OF PRESENT ILLNESS: HPI:  Mr Marshall is a very pleasant 44yoM who presents to our office with worsening/progressive chest discomfort.  Described as heaviness that is substernal/mid-chest, nonradiating, occuring while climbing stairs at work.  He is a .  Has HTN, and abdominal obesity.  Takes Amlodipine for HTN and PPI for GERD.  Date of service 12/28- resting in bed, no angina. awaiting CCTA tomorrow.  HR in 60s.    PAST MEDICAL & SURGICAL HISTORY:  HTN  Obesity    aspirin enteric coated 81 milliGRAM(s) Oral daily  dextrose 5%. 1000 milliLiter(s) IV Continuous <Continuous>  dextrose 5%. 1000 milliLiter(s) IV Continuous <Continuous>  dextrose 50% Injectable 12.5 Gram(s) IV Push once  dextrose 50% Injectable 25 Gram(s) IV Push once  dextrose 50% Injectable 25 Gram(s) IV Push once  dextrose Oral Gel 15 Gram(s) Oral once PRN  glucagon  Injectable 1 milliGRAM(s) IntraMuscular once  influenza   Vaccine 0.5 milliLiter(s) IntraMuscular once  insulin lispro (ADMELOG) corrective regimen sliding scale   SubCutaneous three times a day before meals  insulin lispro (ADMELOG) corrective regimen sliding scale   SubCutaneous at bedtime  metoprolol tartrate 25 milliGRAM(s) Oral every 6 hours  pantoprazole    Tablet 40 milliGRAM(s) Oral before breakfast                       16.2   8.40  )-----------( 288      ( 27 Dec 2023 15:49 )             48.1       12-28    137  |  99  |  12  ----------------------------<  189<H>  3.7   |  26  |  0.74    Ca    8.8      28 Dec 2023 04:53    TPro  7.2  /  Alb  3.8  /  TBili  0.4  /  DBili  x   /  AST  63<H>  /  ALT  104<H>  /  AlkPhos  103  12-28    T(C): 37.2 (12-28-23 @ 10:35), Max: 37.2 (12-28-23 @ 10:35)  HR: 85 (12-28-23 @ 10:35) (62 - 87)  BP: 112/71 (12-28-23 @ 10:35) (112/71 - 153/105)  RR: 18 (12-28-23 @ 10:35) (16 - 18)  SpO2: 98% (12-28-23 @ 10:35) (97% - 98%)  Wt(kg): --    I&O's Summary    General: Well nourished, no acute distress, alert and oriented x 3  Head: normocephalic, no trauma  Neck: no JVD, no bruit, supple, not enlarged  CV: S1S2, no S3, regular rate, rhythm is SINUS, no murmurs.    Lungs: clear BL, no rales or wheezes  Abdomen: bowel sounds +, soft, nontender, nondistended  Extremities: no clubbing, cyanosis or edema  Neuro: Moves all 4 extremities, sensation intact x 4 extremities  Skin: warm and moist, normal turgor  Psych: Mood and affect are appropriate for circumstances  MSK: normal range of motion and strength x4 extremities.    TELEMETRY: NSR	    ECG: NSR, lateral T-waves flat. 	  Echo: NSR  NST: normal perfusion, 11.5min of exercise on Martir protocol without ischemic changes on EKG.  	    ASSESSMENT/PLAN: Mr Marshall is a very pleasant 44y male here with persistent, progressive chest pain.  Concerning for angina, which is in contrast to his reassuring office workup with normal echo, and good exercise capacity on treadmill / normal nuclear myocardial perfusion imaging.  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  Continue  metoprolol to slow heartrate down towards 60.  Holding parameter for drug set at 50bpm.  If we find any coronary calcium, will have evidence to start Statin therapy early.  Hopefully, no multivessel CAD (patient concerned about CAD due to demographics and family history).  Will follow with you.      Myles Keller M.D.  Cardiac Electrophysiology    office [phone]  pager [phone]

## 2024-02-24 ENCOUNTER — HEALTH MAINTENANCE LETTER (OUTPATIENT)
Age: 34
End: 2024-02-24

## 2024-12-19 NOTE — TELEPHONE ENCOUNTER
"Requested Prescriptions   Pending Prescriptions Disp Refills     amLODIPine (NORVASC) 10 MG tablet [Pharmacy Med Name: AMLODIPINE BESYLATE 10MG TABLETS] 90 tablet 0    Last Written Prescription Date:  3/19/18  Last Fill Quantity: 90,  # refills: 1   Last Office Visit: 9/10/2018 Bhargav      Return in about 1 year (around 9/10/2019) for Physical Exam.     Future Office Visit:      Sig: TAKE 1 TABLET(10 MG) BY MOUTH DAILY    Calcium Channel Blockers Protocol  Passed - 12/13/2018  5:24 AM       Passed - Blood pressure under 140/90 in past 12 months    BP Readings from Last 3 Encounters:   09/17/18 134/82   09/10/18 134/74   08/13/18 130/72                Passed - Recent (12 mo) or future (30 days) visit within the authorizing provider's specialty    Patient had office visit in the last 12 months or has a visit in the next 30 days with authorizing provider or within the authorizing provider's specialty.  See \"Patient Info\" tab in inbasket, or \"Choose Columns\" in Meds & Orders section of the refill encounter.             Passed - Patient is age 18 or older       Passed - No active pregnancy on record       Passed - Normal serum creatinine on file in past 12 months    Recent Labs   Lab Test 06/18/18  1600   CR 0.87            Passed - No positive pregnancy test in past 12 months        " MAILED imaging disc and report from [facility] to the patient home      10/29/2024 - CT Brain/ Head WO Contrast